# Patient Record
Sex: FEMALE | Race: WHITE | Employment: OTHER | URBAN - METROPOLITAN AREA
[De-identification: names, ages, dates, MRNs, and addresses within clinical notes are randomized per-mention and may not be internally consistent; named-entity substitution may affect disease eponyms.]

---

## 2017-05-19 ENCOUNTER — APPOINTMENT (OUTPATIENT)
Dept: MAMMOGRAPHY | Age: 67
End: 2017-05-19
Attending: FAMILY MEDICINE
Payer: COMMERCIAL

## 2017-05-19 ENCOUNTER — HOSPITAL ENCOUNTER (OUTPATIENT)
Dept: MAMMOGRAPHY | Age: 67
Discharge: HOME | End: 2017-05-19
Attending: FAMILY MEDICINE
Payer: COMMERCIAL

## 2017-05-19 DIAGNOSIS — Z78.0 POSTMENOPAUSAL: ICD-10-CM

## 2017-05-19 DIAGNOSIS — Z12.31 ENCOUNTER FOR SCREENING MAMMOGRAM FOR BREAST CANCER: ICD-10-CM

## 2017-05-19 PROCEDURE — 77080 DXA BONE DENSITY AXIAL: CPT

## 2017-05-19 PROCEDURE — 77067 SCR MAMMO BI INCL CAD: CPT

## 2017-05-26 RX ORDER — CEFAZOLIN SODIUM IN 0.9 % NACL 2 G/50 ML
2 INTRAVENOUS SOLUTION, PIGGYBACK (ML) INTRAVENOUS ONCE
Status: CANCELLED | OUTPATIENT
Start: 2017-05-26 | End: 2017-05-26

## 2017-05-26 NOTE — H&P
[id number] [facility]  Pre Operative History and Physical Exam    Patient ID:  Mary Mariano  [id number]  46 y.o.  1950    Today: May 26, 2017          CC:  Left  Knee pain    HPI:   The patient has end stage arthritis of the left knee. The patient was evaluated and examined during a consultation prior to today. The patient complains of knee pain with activities, reports pain as mostly occurring along the joint lines, reports stiffness of the knee with prolonged inactivity, and swelling/pain at the end of the day and after increased physical activity. The pain affects the patients activities of daily living and quality of life. The patient has attempted and exhausted conservative treatment. There have been no changes to the patient's orthopedic condition since the last office visit. Past Medical History:  Past Medical History:   Diagnosis Date    Asthma     stable-last used rescue inhaler 7/12; no hospitalized; denies SOB with exertion    Diabetes ([address] 10/12- no meds    \"borderline\"- A1C was high- had 120 fasting    Hypertension     controlled with med    Morbid obesity ([address]     BMI-34.5 on 2/14/13    Thyroid disease     hypo- on med    Unspecified adverse effect of anesthesia     possible sleep apnea       Past Surgical History:  Past Surgical History:   Procedure Laterality Date    HX APPENDECTOMY  1966    HX CARPAL TUNNEL RELEASE  1984    HX GYN      TVH    HX HEENT      T&A/ sinus    HX ORTHOPAEDIC      knee scopes- x 4- SA for one    HX REFRACTIVE SURGERY  2001    NEUROLOGICAL PROCEDURE UNLISTED      [facility]        Medications:     Prior to Admission medications    Medication Sig Start Date End Date Taking? Authorizing Provider   ergocalciferol (ERGOCALCIFEROL) 50,000 unit capsule Take 1 Cap by mouth every seven (7) days.  Indications: VITAMIN D DEFICIENCY (HIGH DOSE THERAPY) 5/5/17   Dee Garay MD   linagliptin-metformin (JENTADUETO XR) 5-1,000 mg TBph Take 1 Tab by mouth daily. 5/5/17   Donald Bueno MD   albuterol (PROVENTIL HFA) 90 mcg/actuation inhaler Take 1 Puff by inhalation every four (4) hours as needed. 4/6/17   Donald Bueno MD   montelukast (SINGULAIR) 10 mg tablet TAKE 1 TABLET DAILY 3/20/17   Donald Bueno MD   levothyroxine (SYNTHROID) 75 mcg tablet TAKE 1 TABLET DAILY BEFORE BREAKFAST 3/10/17   Donald Bueno MD   hydroCHLOROthiazide (HYDRODIURIL) 25 mg tablet Take 1 Tab by mouth daily. Indications: am 1/16/17   Donald Bueno MD   ibuprofen (MOTRIN) 800 mg tablet Take 1 Tab by mouth every eight (8) hours as needed. Takes at bedtime 10/27/16   Donald Bueno MD   amLODIPine-benazepril (LOTREL) 5-10 mg per capsule Take 1 Cap by mouth daily. 10/27/16   Donald Bueno MD       Family History:     Family History   Problem Relation Age of Onset    Heart Disease Mother      CHF    Diabetes Mother     Stroke Father     Heart Disease Father      MI x 3    Lung Disease Father      COPD    Hypertension Father     Heart Failure Father     Diabetes Sister     Breast Cancer Sister     Cancer Paternal Aunt     Cancer Paternal Uncle      brain    Cancer Maternal Grandmother      aortic       Social History:      Social History   Substance Use Topics    Smoking status: Former Smoker    Smokeless tobacco: Not on file    Alcohol use Yes      Comment: socially; beer,mixed,wine 1-3 xs weekly         Allergies: Allergies   Allergen Reactions    Other Medication Contact Dermatitis and Other (comments)     Neosporin/sulfa/bacitracin  \"all ointments- creams are OK\"-  blisters        Vitals: There were no vitals taken for this visit. Objective:         General: No Acute distress                   HEENT: Normocephalic/atramatic                   Lungs:  Breathing non-labored                   Heart:   RRR                    Abdomen: soft       Extremities:  Pain with ROM of the left knee. Trace effusion. Crepitus present.  Distally the patient shows no neurologic deficit. Antalgic gait appreciated. Meds:   Current Outpatient Prescriptions   Medication Sig    ergocalciferol (ERGOCALCIFEROL) 50,000 unit capsule Take 1 Cap by mouth every seven (7) days. Indications: VITAMIN D DEFICIENCY (HIGH DOSE THERAPY)    linagliptin-metformin (JENTADUETO XR) 5-1,000 mg TBph Take 1 Tab by mouth daily.  albuterol (PROVENTIL HFA) 90 mcg/actuation inhaler Take 1 Puff by inhalation every four (4) hours as needed.  montelukast (SINGULAIR) 10 mg tablet TAKE 1 TABLET DAILY    levothyroxine (SYNTHROID) 75 mcg tablet TAKE 1 TABLET DAILY BEFORE BREAKFAST    hydroCHLOROthiazide (HYDRODIURIL) 25 mg tablet Take 1 Tab by mouth daily. Indications: am    ibuprofen (MOTRIN) 800 mg tablet Take 1 Tab by mouth every eight (8) hours as needed. Takes at bedtime    amLODIPine-benazepril (LOTREL) 5-10 mg per capsule Take 1 Cap by mouth daily. No current facility-administered medications for this encounter. There is no problem list on file for this patient. Assessment:   1. Arthritis of the Left knee    Plan:   The patient has failed previous conservative treatment for this condition including anti-inflammatories, injections and lifestyle modifications. The necessity for joint replacement is present. Risks, benefits, alternatives and possible complications of left knee arthroplasty have been discussed with the patient including but not limited to infection, bleeding, damage to nerves and/or blood vessels, stiffness of the knee after surgery, potential for patellar maltracking, potential for fracture both intra-op and post-op, polyethylene wear, implant loosening, risk for revision surgery should any of the above occur, venous thrombo-embolism including deep vein thrombosis and pulmonary embolism, and potential for continued pain after surgery.  We have also previously discussed the potential of morbidity and mortality associated with, but not limited to, the actual surgical procedure, anesthesia, prior medical conditions, and/or the administration of medications perioperatively. The patient has been given the opportunity to ask questions all of which have been answered and the patient wishes to proceed. The patient will be admitted the day of surgery for left total knee replacement.         Signed By: Ly Moreno MD  May 26, 2017

## 2017-06-01 ENCOUNTER — HOSPITAL ENCOUNTER (OUTPATIENT)
Dept: SURGERY | Age: 67
Discharge: HOME | End: 2017-06-01
Attending: ORTHOPAEDIC SURGERY
Payer: MEDICARE

## 2017-06-01 VITALS
DIASTOLIC BLOOD PRESSURE: 59 MMHG | TEMPERATURE: 97.5 F | OXYGEN SATURATION: 98 % | HEIGHT: 65 IN | WEIGHT: 216.2 LBS | RESPIRATION RATE: 18 BRPM | BODY MASS INDEX: 36.02 KG/M2 | SYSTOLIC BLOOD PRESSURE: 118 MMHG | HEART RATE: 71 BPM

## 2017-06-01 LAB
ALBUMIN SERPL BCP-MCNC: 4 G/DL (ref 3.2–4.6)
ANION GAP BLD CALC-SCNC: 10 MMOL/L (ref 7–16)
APPEARANCE UR: CLEAR
APTT PPP: 23.2 SEC (ref 23.5–31.7)
ATRIAL RATE: 73 BPM
BACTERIA SPEC CULT: ABNORMAL
BACTERIA URNS QL MICRO: 0 /HPF
BASOPHILS # BLD AUTO: 0.1 K/UL (ref 0–0.2)
BASOPHILS # BLD: 1 % (ref 0–2)
BILIRUB UR QL: ABNORMAL
BUN SERPL-MCNC: 14 MG/DL (ref 8–23)
CALCIUM SERPL-MCNC: 9.6 MG/DL (ref 8.3–10.4)
CALCULATED P AXIS, ECG09: 27 DEGREES
CALCULATED R AXIS, ECG10: -49 DEGREES
CALCULATED T AXIS, ECG11: 80 DEGREES
CHLORIDE SERPL-SCNC: 103 MMOL/L (ref 98–107)
CO2 SERPL-SCNC: 30 MMOL/L (ref 21–32)
COLOR UR: YELLOW
CREAT SERPL-MCNC: 0.83 MG/DL (ref 0.6–1)
DIAGNOSIS, 93000: NORMAL
DIFFERENTIAL METHOD BLD: NORMAL
EOSINOPHIL # BLD: 0.1 K/UL (ref 0–0.8)
EOSINOPHIL NFR BLD: 2 % (ref 0.5–7.8)
ERYTHROCYTE [DISTWIDTH] IN BLOOD BY AUTOMATED COUNT: 13.7 % (ref 11.9–14.6)
EST. AVERAGE GLUCOSE BLD GHB EST-MCNC: 166 MG/DL
GLUCOSE SERPL-MCNC: 120 MG/DL (ref 65–100)
GLUCOSE UR STRIP.AUTO-MCNC: 500 MG/DL
HBA1C MFR BLD: 7.4 % (ref 4.8–6)
HCT VFR BLD AUTO: 44.8 % (ref 35.8–46.3)
HGB BLD-MCNC: 15.2 G/DL (ref 11.7–15.4)
HGB UR QL STRIP: NEGATIVE
IMM GRANULOCYTES # BLD: 0 K/UL (ref 0–0.5)
IMM GRANULOCYTES NFR BLD AUTO: 0.3 % (ref 0–5)
INR PPP: 0.9 (ref 0.9–1.2)
KETONES UR QL STRIP.AUTO: NEGATIVE MG/DL
LEUKOCYTE ESTERASE UR QL STRIP.AUTO: NEGATIVE
LYMPHOCYTES # BLD AUTO: 25 % (ref 13–44)
LYMPHOCYTES # BLD: 1.6 K/UL (ref 0.5–4.6)
MCH RBC QN AUTO: 31.3 PG (ref 26.1–32.9)
MCHC RBC AUTO-ENTMCNC: 33.9 G/DL (ref 31.4–35)
MCV RBC AUTO: 92.4 FL (ref 79.6–97.8)
MONOCYTES # BLD: 0.6 K/UL (ref 0.1–1.3)
MONOCYTES NFR BLD AUTO: 9 % (ref 4–12)
NEUTS SEG # BLD: 4.2 K/UL (ref 1.7–8.2)
NEUTS SEG NFR BLD AUTO: 63 % (ref 43–78)
NITRITE UR QL STRIP.AUTO: NEGATIVE
OTHER OBSERVATIONS,UCOM: ABNORMAL
P-R INTERVAL, ECG05: 152 MS
PH UR STRIP: 6 [PH] (ref 5–9)
PLATELET # BLD AUTO: 270 K/UL (ref 150–450)
PMV BLD AUTO: 11.5 FL (ref 10.8–14.1)
POTASSIUM SERPL-SCNC: 3.7 MMOL/L (ref 3.5–5.1)
PROT UR STRIP-MCNC: NEGATIVE MG/DL
PROTHROMBIN TIME: 10 SEC (ref 9.6–12)
Q-T INTERVAL, ECG07: 398 MS
QRS DURATION, ECG06: 84 MS
QTC CALCULATION (BEZET), ECG08: 438 MS
RBC # BLD AUTO: 4.85 M/UL (ref 4.05–5.25)
SERVICE CMNT-IMP: ABNORMAL
SODIUM SERPL-SCNC: 143 MMOL/L (ref 136–145)
SP GR UR REFRACTOMETRY: 1.01 (ref 1–1.02)
UROBILINOGEN UR QL STRIP.AUTO: 0.2 EU/DL (ref 0.2–1)
VENTRICULAR RATE, ECG03: 73 BPM
WBC # BLD AUTO: 6.6 K/UL (ref 4.3–11.1)

## 2017-06-01 PROCEDURE — 83036 HEMOGLOBIN GLYCOSYLATED A1C: CPT | Performed by: ORTHOPAEDIC SURGERY

## 2017-06-01 PROCEDURE — 85730 THROMBOPLASTIN TIME PARTIAL: CPT | Performed by: ORTHOPAEDIC SURGERY

## 2017-06-01 PROCEDURE — 80048 BASIC METABOLIC PNL TOTAL CA: CPT | Performed by: ORTHOPAEDIC SURGERY

## 2017-06-01 PROCEDURE — 81003 URINALYSIS AUTO W/O SCOPE: CPT | Performed by: ORTHOPAEDIC SURGERY

## 2017-06-01 PROCEDURE — 85025 COMPLETE CBC W/AUTO DIFF WBC: CPT | Performed by: ORTHOPAEDIC SURGERY

## 2017-06-01 PROCEDURE — 82040 ASSAY OF SERUM ALBUMIN: CPT | Performed by: ORTHOPAEDIC SURGERY

## 2017-06-01 PROCEDURE — 87641 MR-STAPH DNA AMP PROBE: CPT | Performed by: ORTHOPAEDIC SURGERY

## 2017-06-01 PROCEDURE — 85610 PROTHROMBIN TIME: CPT | Performed by: ORTHOPAEDIC SURGERY

## 2017-06-01 PROCEDURE — 93005 ELECTROCARDIOGRAM TRACING: CPT | Performed by: ORTHOPAEDIC SURGERY

## 2017-06-01 PROCEDURE — 36415 COLL VENOUS BLD VENIPUNCTURE: CPT | Performed by: ORTHOPAEDIC SURGERY

## 2017-06-01 NOTE — PERIOP NOTES
Lab results are within limits per anesthesia protocol. Pt to come back to Outpatient Lab for urine nicotene; per Lab not enough urine to run test.  Pt verbalizes understanding, states she will come 6-2-17.

## 2017-06-01 NOTE — PERIOP NOTES
Patient verified name, , and surgery as listed in [facility]. Anesthesia review ekg. Pt is allergic to base in all ointments; cannot use mupirocin ointment. If MSSA swab is positive pt is to have Betadine nasal wash on DOS. Type 3 surgery, Joint assessment complete. Labs per surgeon: cbc,bmp,pt,ptt,ua, urine nicotene, albumin, hgb a1c,mssa ; results pending. Labs per anesthesia protocol: T&S on DOS. EKG:today-abnormal- anesthesia review. Pt reports only prior ekg for comparison > 20 years ago. Hibiclens and instructions given per hospital policy. Nasal Swab collected per MD order and instructions for Mupirocin nasal ointment if required. Patient provided with handouts including Guide to Surgery, Pain Management, Hand Hygiene, Blood Transfusion Education, and [place] [facility] Brochure. Patient answered medical/surgical history questions at their best of ability. All prior to admission medications documented in [facility]. Original medication prescription bottle  visualized during patient appointment. Patient instructed to hold all vitamins 7 days prior to surgery and NSAIDS 5 days prior to surgery. Medications to be held prior to surgery : none. Patient instructed to continue previous medications as prescribed prior to surgery and to take the following medications the day of surgery according to anesthesia guidelines with a small sip of water: levothyroxine; use proventil MDI if needed and bring. .  Patient taught back and verbalized understanding.

## 2017-06-01 NOTE — PERIOP NOTES
Recent Results (from the past 12 hour(s))   EKG, 12 LEAD, INITIAL    Collection Time: 06/01/17 10:15 AM   Result Value Ref Range    Ventricular Rate 73 BPM    Atrial Rate 73 BPM    P-R Interval 152 ms    QRS Duration 84 ms    Q-T Interval 398 ms    QTC Calculation (Bezet) 438 ms    Calculated P Axis 27 degrees    Calculated R Axis -49 degrees    Calculated T Axis 80 degrees    Diagnosis       Normal sinus rhythm  Left anterior fascicular block  Abnormal ECG  When compared with ECG of 01-JUN-2017 10:12,  No significant change was found     URINALYSIS W/ RFLX MICROSCOPIC    Collection Time: 06/01/17 11:00 AM   Result Value Ref Range    Color YELLOW      Appearance CLEAR      Specific gravity 1.010 1.001 - 1.023      pH (UA) 6.0 5.0 - 9.0      Protein NEGATIVE  NEG mg/dL    Glucose 500 (A) NEG mg/dL    Ketone NEGATIVE  NEG mg/dL    Bilirubin SMALL (A) NEG      Blood NEGATIVE  NEG      Urobilinogen 0.2 0.2 - 1.0 EU/dL    Nitrites NEGATIVE  NEG      Leukocyte Esterase NEGATIVE  NEG      Bacteria 0 0 /hpf    Other observations PERFORMED MANUALLY ON < 2 CC OF URINE    CBC WITH AUTOMATED DIFF    Collection Time: 06/01/17 11:05 AM   Result Value Ref Range    WBC 6.6 4.3 - 11.1 K/uL    RBC 4.85 4.05 - 5.25 M/uL    HGB 15.2 11.7 - 15.4 g/dL    HCT 44.8 35.8 - 46.3 %    MCV 92.4 79.6 - 97.8 FL    MCH 31.3 26.1 - 32.9 PG    MCHC 33.9 31.4 - 35.0 g/dL    RDW 13.7 11.9 - 14.6 %    PLATELET 218 563 - 034 K/uL    MPV 11.5 10.8 - 14.1 FL    DF AUTOMATED      NEUTROPHILS 63 43 - 78 %    LYMPHOCYTES 25 13 - 44 %    MONOCYTES 9 4.0 - 12.0 %    EOSINOPHILS 2 0.5 - 7.8 %    BASOPHILS 1 0.0 - 2.0 %    IMMATURE GRANULOCYTES 0.3 0.0 - 5.0 %    ABS. NEUTROPHILS 4.2 1.7 - 8.2 K/UL    ABS. LYMPHOCYTES 1.6 0.5 - 4.6 K/UL    ABS. MONOCYTES 0.6 0.1 - 1.3 K/UL    ABS. EOSINOPHILS 0.1 0.0 - 0.8 K/UL    ABS. BASOPHILS 0.1 0.0 - 0.2 K/UL    ABS. IMM.  GRANS. 0.0 0.0 - 0.5 K/UL   PROTHROMBIN TIME + INR    Collection Time: 06/01/17 11:05 AM   Result Value Ref Range    Prothrombin time 10.0 9.6 - 12.0 sec    INR 0.9 0.9 - 1.2     PTT    Collection Time: 06/01/17 11:05 AM   Result Value Ref Range    aPTT 23.2 (L) 23.5 - 71.2 SEC   METABOLIC PANEL, BASIC    Collection Time: 06/01/17 11:05 AM   Result Value Ref Range    Sodium 143 136 - 145 mmol/L    Potassium 3.7 3.5 - 5.1 mmol/L    Chloride 103 98 - 107 mmol/L    CO2 30 21 - 32 mmol/L    Anion gap 10 7 - 16 mmol/L    Glucose 120 (H) 65 - 100 mg/dL    BUN 14 8 - 23 MG/DL    Creatinine 0.83 0.6 - 1.0 MG/DL    GFR est AA >60 >60 ml/min/1.73m2    GFR est non-AA >60 >60 ml/min/1.73m2    Calcium 9.6 8.3 - 10.4 MG/DL   ALBUMIN    Collection Time: 06/01/17 11:05 AM   Result Value Ref Range    Albumin 4.0 3.2 - 4.6 g/dL   HEMOGLOBIN A1C WITH EAG    Collection Time: 06/01/17 11:05 AM   Result Value Ref Range    Hemoglobin A1c 7.4 (H) 4.8 - 6.0 %    Est. average glucose 166 mg/dL

## 2017-06-01 NOTE — PERIOP NOTES
Recent Results (from the past 12 hour(s))   EKG, 12 LEAD, INITIAL    Collection Time: 06/01/17 10:15 AM   Result Value Ref Range    Ventricular Rate 73 BPM    Atrial Rate 73 BPM    P-R Interval 152 ms    QRS Duration 84 ms    Q-T Interval 398 ms    QTC Calculation (Bezet) 438 ms    Calculated P Axis 27 degrees    Calculated R Axis -49 degrees    Calculated T Axis 80 degrees    Diagnosis       Normal sinus rhythm  Left anterior fascicular block  Abnormal ECG  When compared with ECG of 01-JUN-2017 10:12,  No significant change was found     URINALYSIS W/ RFLX MICROSCOPIC    Collection Time: 06/01/17 11:00 AM   Result Value Ref Range    Color YELLOW      Appearance CLEAR      Specific gravity 1.010 1.001 - 1.023      pH (UA) 6.0 5.0 - 9.0      Protein NEGATIVE  NEG mg/dL    Glucose 500 (A) NEG mg/dL    Ketone NEGATIVE  NEG mg/dL    Bilirubin SMALL (A) NEG      Blood NEGATIVE  NEG      Urobilinogen 0.2 0.2 - 1.0 EU/dL    Nitrites NEGATIVE  NEG      Leukocyte Esterase NEGATIVE  NEG      Bacteria 0 0 /hpf    Other observations PERFORMED MANUALLY ON < 2 CC OF URINE    CBC WITH AUTOMATED DIFF    Collection Time: 06/01/17 11:05 AM   Result Value Ref Range    WBC 6.6 4.3 - 11.1 K/uL    RBC 4.85 4.05 - 5.25 M/uL    HGB 15.2 11.7 - 15.4 g/dL    HCT 44.8 35.8 - 46.3 %    MCV 92.4 79.6 - 97.8 FL    MCH 31.3 26.1 - 32.9 PG    MCHC 33.9 31.4 - 35.0 g/dL    RDW 13.7 11.9 - 14.6 %    PLATELET 925 928 - 082 K/uL    MPV 11.5 10.8 - 14.1 FL    DF AUTOMATED      NEUTROPHILS 63 43 - 78 %    LYMPHOCYTES 25 13 - 44 %    MONOCYTES 9 4.0 - 12.0 %    EOSINOPHILS 2 0.5 - 7.8 %    BASOPHILS 1 0.0 - 2.0 %    IMMATURE GRANULOCYTES 0.3 0.0 - 5.0 %    ABS. NEUTROPHILS 4.2 1.7 - 8.2 K/UL    ABS. LYMPHOCYTES 1.6 0.5 - 4.6 K/UL    ABS. MONOCYTES 0.6 0.1 - 1.3 K/UL    ABS. EOSINOPHILS 0.1 0.0 - 0.8 K/UL    ABS. BASOPHILS 0.1 0.0 - 0.2 K/UL    ABS. IMM.  GRANS. 0.0 0.0 - 0.5 K/UL   PROTHROMBIN TIME + INR    Collection Time: 06/01/17 11:05 AM   Result Value Ref Range    Prothrombin time 10.0 9.6 - 12.0 sec    INR 0.9 0.9 - 1.2     PTT    Collection Time: 06/01/17 11:05 AM   Result Value Ref Range    aPTT 23.2 (L) 23.5 - 65.6 SEC   METABOLIC PANEL, BASIC    Collection Time: 06/01/17 11:05 AM   Result Value Ref Range    Sodium 143 136 - 145 mmol/L    Potassium 3.7 3.5 - 5.1 mmol/L    Chloride 103 98 - 107 mmol/L    CO2 30 21 - 32 mmol/L    Anion gap 10 7 - 16 mmol/L    Glucose 120 (H) 65 - 100 mg/dL    BUN 14 8 - 23 MG/DL    Creatinine 0.83 0.6 - 1.0 MG/DL    GFR est AA >60 >60 ml/min/1.73m2    GFR est non-AA >60 >60 ml/min/1.73m2    Calcium 9.6 8.3 - 10.4 MG/DL   ALBUMIN    Collection Time: 06/01/17 11:05 AM   Result Value Ref Range    Albumin 4.0 3.2 - 4.6 g/dL   HEMOGLOBIN A1C WITH EAG    Collection Time: 06/01/17 11:05 AM   Result Value Ref Range    Hemoglobin A1c 7.4 (H) 4.8 - 6.0 %    Est. average glucose 166 mg/dL

## 2017-06-01 NOTE — ADVANCED PRACTICE NURSE
Total Joint Surgery Preoperative Chart Review      Patient ID:  Odette Smith  [id number]  60 y.o.  1950  Surgeon: Dr Nancy Carballo  Date of Surgery: 6/7/2017  Procedure: Total Left Knee Arthroplasty  Primary Care Physician: Leidy Cardona [phone]  Specialty Physician(s):      Subjective: Odette Smith is a 74 y.o. WHITE OR  female who presents for preoperative evaluation for Total Left Knee arthroplasty. This is a preoperative chart review note based on data collected by the nurse at the surgical Pre-Assessment visit.     Past Medical History:   Diagnosis Date    Asthma     stable-last used rescue inhaler 7/12; no hospitalized; denies SOB with exertion    Diabetes ([address] 2012    \"borderline\"- A1C was high- had 120 fasting; Hgb A1C 7.4 on 6-1-17    Hypertension     controlled with med    Morbid obesity ([address]     Thromboembolus ([address]     hx 1980s r/t HRT    Thyroid disease     hypo- on med    Unspecified adverse effect of anesthesia     possible sleep apnea      Past Surgical History:   Procedure Laterality Date    HX APPENDECTOMY  1966    HX CARPAL TUNNEL RELEASE  1984    HX GYN      TVH    HX HEENT      T&A/ sinus    HX ORTHOPAEDIC      knee scopes- x 4- SA for one    HX REFRACTIVE SURGERY  2001    NEUROLOGICAL PROCEDURE UNLISTED      [facility]     Family History   Problem Relation Age of Onset    Heart Disease Mother      CHF    Diabetes Mother     Stroke Father     Heart Disease Father      MI x 3    Lung Disease Father      COPD    Hypertension Father     Heart Failure Father     Diabetes Sister     Breast Cancer Sister     Cancer Paternal Aunt     Cancer Paternal Uncle      brain    Cancer Maternal Grandmother      aortic      Social History   Substance Use Topics    Smoking status: Former Smoker    Smokeless tobacco: Not on file    Alcohol use Yes      Comment: socially; beer,mixed,wine 1-3 xs weekly       Prior to Admission medications    Medication Sig Start Date End Date Taking? Authorizing Provider   ergocalciferol (ERGOCALCIFEROL) 50,000 unit capsule Take 1 Cap by mouth every seven (7) days. Indications: VITAMIN D DEFICIENCY (HIGH DOSE THERAPY) 5/5/17   Iraida Chaudhari MD   linagliptin-metformin (JENTADUETO XR) 5-1,000 mg TBph Take 1 Tab by mouth daily. Patient taking differently: Take 1 Tab by mouth daily. Indications: type 2 diabetes mellitus 5/5/17   Iraida Chaudhari MD   albuterol (PROVENTIL HFA) 90 mcg/actuation inhaler Take 1 Puff by inhalation every four (4) hours as needed. Patient taking differently: Take 1 Puff by inhalation every four (4) hours as needed. Take morning of surgery per anesthesia guidelines if needed and bring to hospital 4/6/17   Iraida Chaudhari MD   montelukast (SINGULAIR) 10 mg tablet TAKE 1 TABLET DAILY 3/20/17   Iraida Chaudhari MD   levothyroxine (SYNTHROID) 75 mcg tablet TAKE 1 TABLET DAILY BEFORE BREAKFAST 3/10/17   Iraida Chaudhari MD   hydroCHLOROthiazide (HYDRODIURIL) 25 mg tablet Take 1 Tab by mouth daily. Indications: am 1/16/17   Iraida Chaudhari MD   amLODIPine-benazepril (LOTREL) 5-10 mg per capsule Take 1 Cap by mouth daily.  10/27/16   Iraida Chaudhari MD     Allergies   Allergen Reactions    Other Medication Contact Dermatitis and Other (comments)     Neosporin/sulfa/bacitracin  \"all ointments- creams are OK\"-  blisters    Sulfa (Sulfonamide Antibiotics) Rash    Ultram [Tramadol] Nausea and Vomiting          Objective:     Physical Exam:   Patient Vitals for the past 24 hrs:   Temp Pulse Resp BP SpO2   06/01/17 1008 97.5 °F (36.4 °C) 71 18 118/59 98 %       ECG:    EKG Results     Procedure 720 Value Units Date/Time    EKG, 12 LEAD, INITIAL [[id number]] Collected:  06/01/17 1015    Order Status:  Completed Updated:  06/01/17 1206     Ventricular Rate 73 BPM      Atrial Rate 73 BPM      P-R Interval 152 ms      QRS Duration 84 ms      Q-T Interval 398 ms      QTC Calculation (Bezet) 438 ms      Calculated P Axis 27 degrees      Calculated R Axis -49 degrees      Calculated T Axis 80 degrees      Diagnosis --     Normal sinus rhythm  Left anterior fascicular block  Abnormal ECG  When compared with ECG of 01-JUN-2017 10:12,  No significant change was found            Data Review:   Labs:   Recent Results (from the past 24 hour(s))   EKG, 12 LEAD, INITIAL    Collection Time: 06/01/17 10:15 AM   Result Value Ref Range    Ventricular Rate 73 BPM    Atrial Rate 73 BPM    P-R Interval 152 ms    QRS Duration 84 ms    Q-T Interval 398 ms    QTC Calculation (Bezet) 438 ms    Calculated P Axis 27 degrees    Calculated R Axis -49 degrees    Calculated T Axis 80 degrees    Diagnosis       Normal sinus rhythm  Left anterior fascicular block  Abnormal ECG  When compared with ECG of 01-JUN-2017 10:12,  No significant change was found     URINALYSIS W/ RFLX MICROSCOPIC    Collection Time: 06/01/17 11:00 AM   Result Value Ref Range    Color YELLOW      Appearance CLEAR      Specific gravity 1.010 1.001 - 1.023      pH (UA) 6.0 5.0 - 9.0      Protein NEGATIVE  NEG mg/dL    Glucose 500 (A) NEG mg/dL    Ketone NEGATIVE  NEG mg/dL    Bilirubin SMALL (A) NEG      Blood NEGATIVE  NEG      Urobilinogen 0.2 0.2 - 1.0 EU/dL    Nitrites NEGATIVE  NEG      Leukocyte Esterase NEGATIVE  NEG      Bacteria 0 0 /hpf    Other observations PERFORMED MANUALLY ON < 2 CC OF URINE    CBC WITH AUTOMATED DIFF    Collection Time: 06/01/17 11:05 AM   Result Value Ref Range    WBC 6.6 4.3 - 11.1 K/uL    RBC 4.85 4.05 - 5.25 M/uL    HGB 15.2 11.7 - 15.4 g/dL    HCT 44.8 35.8 - 46.3 %    MCV 92.4 79.6 - 97.8 FL    MCH 31.3 26.1 - 32.9 PG    MCHC 33.9 31.4 - 35.0 g/dL    RDW 13.7 11.9 - 14.6 %    PLATELET 534 127 - 448 K/uL    MPV 11.5 10.8 - 14.1 FL    DF AUTOMATED      NEUTROPHILS 63 43 - 78 %    LYMPHOCYTES 25 13 - 44 %    MONOCYTES 9 4.0 - 12.0 %    EOSINOPHILS 2 0.5 - 7.8 %    BASOPHILS 1 0.0 - 2.0 %    IMMATURE GRANULOCYTES 0.3 0.0 - 5.0 %    ABS. NEUTROPHILS 4.2 1.7 - 8.2 K/UL    ABS. LYMPHOCYTES 1.6 0.5 - 4.6 K/UL    ABS. MONOCYTES 0.6 0.1 - 1.3 K/UL    ABS. EOSINOPHILS 0.1 0.0 - 0.8 K/UL    ABS. BASOPHILS 0.1 0.0 - 0.2 K/UL    ABS. IMM. GRANS. 0.0 0.0 - 0.5 K/UL   PROTHROMBIN TIME + INR    Collection Time: 06/01/17 11:05 AM   Result Value Ref Range    Prothrombin time 10.0 9.6 - 12.0 sec    INR 0.9 0.9 - 1.2     PTT    Collection Time: 06/01/17 11:05 AM   Result Value Ref Range    aPTT 23.2 (L) 23.5 - 45.7 SEC   METABOLIC PANEL, BASIC    Collection Time: 06/01/17 11:05 AM   Result Value Ref Range    Sodium 143 136 - 145 mmol/L    Potassium 3.7 3.5 - 5.1 mmol/L    Chloride 103 98 - 107 mmol/L    CO2 30 21 - 32 mmol/L    Anion gap 10 7 - 16 mmol/L    Glucose 120 (H) 65 - 100 mg/dL    BUN 14 8 - 23 MG/DL    Creatinine 0.83 0.6 - 1.0 MG/DL    GFR est AA >60 >60 ml/min/1.73m2    GFR est non-AA >60 >60 ml/min/1.73m2    Calcium 9.6 8.3 - 10.4 MG/DL   ALBUMIN    Collection Time: 06/01/17 11:05 AM   Result Value Ref Range    Albumin 4.0 3.2 - 4.6 g/dL   HEMOGLOBIN A1C WITH EAG    Collection Time: 06/01/17 11:05 AM   Result Value Ref Range    Hemoglobin A1c 7.4 (H) 4.8 - 6.0 %    Est. average glucose 166 mg/dL           Total Joint Surgery Pre-Assessment Recommendations:           Risk for BIJU. Recommend continuous saturation monitoring hours of sleep, during hospitalization.         Signed By: ERI Carrion    June 1, 2017

## 2017-06-02 ENCOUNTER — HOSPITAL ENCOUNTER (OUTPATIENT)
Dept: LAB | Age: 67
Discharge: HOME | End: 2017-06-02
Payer: MEDICARE

## 2017-06-02 PROCEDURE — 80307 DRUG TEST PRSMV CHEM ANLYZR: CPT | Performed by: ORTHOPAEDIC SURGERY

## 2017-06-02 NOTE — PERIOP NOTES
Nasal swab results reviewed:   Culture result:  (A)    Final   MRSA target DNA not detected, SA target DNA detected.   A MRSA negative, SA positive test result does not preclude MRSA nasal colonization         Called pt and informed of results    Due to pt's allergies to ointments (due to base product in all ointments), pt will require betadine nasal swab pre op DOS per guidelines.

## 2017-06-05 LAB
COTININE UR QL SCN: NEGATIVE NG/ML
DRUG SCREEN COMMENT:, 753798: NORMAL

## 2017-06-05 NOTE — PERIOP NOTES
6/5/2017  8:36 AM - Rene, [facility]   Component Results   Component Value Flag Ref Range Units Status   Cotinine Screen, urine NEGATIVE   Pmysfk=280 ng/mL Final

## 2017-06-06 ENCOUNTER — ANESTHESIA EVENT (OUTPATIENT)
Dept: SURGERY | Age: 67
DRG: 470 | End: 2017-06-06
Payer: MEDICARE

## 2017-06-06 RX ORDER — POVIDONE-IODINE 10 %
SOLUTION, NON-ORAL TOPICAL ONCE
Status: DISPENSED | OUTPATIENT
Start: 2017-06-06 | End: 2017-06-07

## 2017-06-06 NOTE — ANESTHESIA PREPROCEDURE EVALUATION
Anesthetic History               Review of Systems / Medical History  Patient summary reviewed and pertinent labs reviewed    Pulmonary          Undiagnosed apnea  Asthma : well controlled       Neuro/Psych              Cardiovascular    Hypertension                   GI/Hepatic/Renal  Within defined limits              Endo/Other    Diabetes: poorly controlled, type 2  Hypothyroidism  Obesity     Other Findings            Physical Exam    Airway  Mallampati: II  TM Distance: 4 - 6 cm  Neck ROM: normal range of motion   Mouth opening: Normal     Cardiovascular    Rhythm: regular  Rate: normal         Dental         Pulmonary  Breath sounds clear to auscultation               Abdominal         Other Findings            Anesthetic Plan    ASA: 3  Anesthesia type: spinal      Post-op pain plan if not by surgeon: peripheral nerve block single    Induction: Intravenous  Anesthetic plan and risks discussed with: Patient

## 2017-06-07 ENCOUNTER — HOSPITAL ENCOUNTER (INPATIENT)
Age: 67
LOS: 2 days | Discharge: HOME HEALTH | DRG: 470 | End: 2017-06-09
Attending: ORTHOPAEDIC SURGERY | Admitting: ORTHOPAEDIC SURGERY
Payer: MEDICARE

## 2017-06-07 ENCOUNTER — ANESTHESIA (OUTPATIENT)
Dept: SURGERY | Age: 67
DRG: 470 | End: 2017-06-07
Payer: MEDICARE

## 2017-06-07 ENCOUNTER — HOME HEALTH ADMISSION (OUTPATIENT)
Dept: HOME HEALTH SERVICES | Facility: HOME HEALTH | Age: 67
End: 2017-06-07
Payer: MEDICARE

## 2017-06-07 PROBLEM — M17.9 OA (OSTEOARTHRITIS) OF KNEE: Status: ACTIVE | Noted: 2017-06-07

## 2017-06-07 LAB
ABO + RH BLD: NORMAL
BLOOD GROUP ANTIBODIES SERPL: NORMAL
GLUCOSE BLD STRIP.AUTO-MCNC: 153 MG/DL (ref 65–100)
GLUCOSE BLD STRIP.AUTO-MCNC: 162 MG/DL (ref 65–100)
HGB BLD-MCNC: 11.2 G/DL (ref 11.7–15.4)
SPECIMEN EXP DATE BLD: NORMAL

## 2017-06-07 PROCEDURE — C1776 JOINT DEVICE (IMPLANTABLE): HCPCS | Performed by: ORTHOPAEDIC SURGERY

## 2017-06-07 PROCEDURE — 97165 OT EVAL LOW COMPLEX 30 MIN: CPT

## 2017-06-07 PROCEDURE — 74011250636 HC RX REV CODE- 250/636: Performed by: ANESTHESIOLOGY

## 2017-06-07 PROCEDURE — 76010000162 HC OR TIME 1.5 TO 2 HR INTENSV-TIER 1: Performed by: ORTHOPAEDIC SURGERY

## 2017-06-07 PROCEDURE — 76010010054 HC POST OP PAIN BLOCK: Performed by: ORTHOPAEDIC SURGERY

## 2017-06-07 PROCEDURE — 77030036688 HC BLNKT CLD THER S2SG -B

## 2017-06-07 PROCEDURE — 0SRD0J9 REPLACEMENT OF LEFT KNEE JOINT WITH SYNTHETIC SUBSTITUTE, CEMENTED, OPEN APPROACH: ICD-10-PCS | Performed by: ORTHOPAEDIC SURGERY

## 2017-06-07 PROCEDURE — 82962 GLUCOSE BLOOD TEST: CPT

## 2017-06-07 PROCEDURE — 77030018836 HC SOL IRR NACL ICUM -A: Performed by: ORTHOPAEDIC SURGERY

## 2017-06-07 PROCEDURE — 77030033067 HC SUT PDO STRATFX SPIR J&J -B: Performed by: ORTHOPAEDIC SURGERY

## 2017-06-07 PROCEDURE — 77030031139 HC SUT VCRL2 J&J -A: Performed by: ORTHOPAEDIC SURGERY

## 2017-06-07 PROCEDURE — 74011000250 HC RX REV CODE- 250

## 2017-06-07 PROCEDURE — 76210000016 HC OR PH I REC 1 TO 1.5 HR: Performed by: ORTHOPAEDIC SURGERY

## 2017-06-07 PROCEDURE — 74011000250 HC RX REV CODE- 250: Performed by: ORTHOPAEDIC SURGERY

## 2017-06-07 PROCEDURE — 85018 HEMOGLOBIN: CPT | Performed by: ORTHOPAEDIC SURGERY

## 2017-06-07 PROCEDURE — 77030032490 HC SLV COMPR SCD KNE COVD -B

## 2017-06-07 PROCEDURE — 77030003666 HC NDL SPINAL BD -A: Performed by: ORTHOPAEDIC SURGERY

## 2017-06-07 PROCEDURE — 74011250636 HC RX REV CODE- 250/636

## 2017-06-07 PROCEDURE — 86580 TB INTRADERMAL TEST: CPT | Performed by: ANESTHESIOLOGY

## 2017-06-07 PROCEDURE — 77030007880 HC KT SPN EPDRL BBMI -B: Performed by: ANESTHESIOLOGY

## 2017-06-07 PROCEDURE — 77010033678 HC OXYGEN DAILY

## 2017-06-07 PROCEDURE — 77030002922 HC SUT FBRWRE ARTH -B: Performed by: ORTHOPAEDIC SURGERY

## 2017-06-07 PROCEDURE — 74011250636 HC RX REV CODE- 250/636: Performed by: ORTHOPAEDIC SURGERY

## 2017-06-07 PROCEDURE — 77030003665 HC NDL SPN BBMI -A: Performed by: ANESTHESIOLOGY

## 2017-06-07 PROCEDURE — 77030002933 HC SUT MCRYL J&J -A: Performed by: ORTHOPAEDIC SURGERY

## 2017-06-07 PROCEDURE — 77030012935 HC DRSG AQUACEL BMS -B: Performed by: ORTHOPAEDIC SURGERY

## 2017-06-07 PROCEDURE — 94760 N-INVAS EAR/PLS OXIMETRY 1: CPT

## 2017-06-07 PROCEDURE — 86900 BLOOD TYPING SEROLOGIC ABO: CPT | Performed by: ORTHOPAEDIC SURGERY

## 2017-06-07 PROCEDURE — 77030020782 HC GWN BAIR PAWS FLX 3M -B: Performed by: ANESTHESIOLOGY

## 2017-06-07 PROCEDURE — 77030013727 HC IRR FAN PULSVC ZIMM -B: Performed by: ORTHOPAEDIC SURGERY

## 2017-06-07 PROCEDURE — 77030003602 HC NDL NRV BLK BBMI -B: Performed by: ANESTHESIOLOGY

## 2017-06-07 PROCEDURE — 74011000302 HC RX REV CODE- 302: Performed by: ANESTHESIOLOGY

## 2017-06-07 PROCEDURE — 77030034849: Performed by: ORTHOPAEDIC SURGERY

## 2017-06-07 PROCEDURE — 74011000250 HC RX REV CODE- 250: Performed by: ANESTHESIOLOGY

## 2017-06-07 PROCEDURE — 76942 ECHO GUIDE FOR BIOPSY: CPT | Performed by: ORTHOPAEDIC SURGERY

## 2017-06-07 PROCEDURE — 77030011640 HC PAD GRND REM COVD -A: Performed by: ORTHOPAEDIC SURGERY

## 2017-06-07 PROCEDURE — 94762 N-INVAS EAR/PLS OXIMTRY CONT: CPT

## 2017-06-07 PROCEDURE — 77030008467 HC STPLR SKN COVD -B: Performed by: ORTHOPAEDIC SURGERY

## 2017-06-07 PROCEDURE — 65270000029 HC RM PRIVATE

## 2017-06-07 PROCEDURE — 77030012890

## 2017-06-07 PROCEDURE — 76060000034 HC ANESTHESIA 1.5 TO 2 HR: Performed by: ORTHOPAEDIC SURGERY

## 2017-06-07 PROCEDURE — 97161 PT EVAL LOW COMPLEX 20 MIN: CPT

## 2017-06-07 PROCEDURE — 36415 COLL VENOUS BLD VENIPUNCTURE: CPT | Performed by: ORTHOPAEDIC SURGERY

## 2017-06-07 PROCEDURE — 74011250637 HC RX REV CODE- 250/637: Performed by: ORTHOPAEDIC SURGERY

## 2017-06-07 DEVICE — COMPONENT PAT DIA35MM THK10MM SUPERIOR/INFERIOR KNEE: Type: IMPLANTABLE DEVICE | Site: KNEE | Status: FUNCTIONAL

## 2017-06-07 DEVICE — IMPLANTABLE DEVICE: Type: IMPLANTABLE DEVICE | Site: KNEE | Status: FUNCTIONAL

## 2017-06-07 DEVICE — BASEPLT TIB PC TRITNM SZ 5 -- TRIATHLON: Type: IMPLANTABLE DEVICE | Site: KNEE | Status: FUNCTIONAL

## 2017-06-07 DEVICE — COMPNT FEM CR TRIATHLN 4 L PA --: Type: IMPLANTABLE DEVICE | Site: KNEE | Status: FUNCTIONAL

## 2017-06-07 RX ORDER — CYCLOBENZAPRINE HCL 10 MG
5 TABLET ORAL 3 TIMES DAILY
Status: DISCONTINUED | OUTPATIENT
Start: 2017-06-07 | End: 2017-06-09 | Stop reason: HOSPADM

## 2017-06-07 RX ORDER — FENTANYL CITRATE 50 UG/ML
100 INJECTION, SOLUTION INTRAMUSCULAR; INTRAVENOUS ONCE
Status: COMPLETED | OUTPATIENT
Start: 2017-06-07 | End: 2017-06-07

## 2017-06-07 RX ORDER — NALOXONE HYDROCHLORIDE 0.4 MG/ML
.2-.4 INJECTION, SOLUTION INTRAMUSCULAR; INTRAVENOUS; SUBCUTANEOUS
Status: DISCONTINUED | OUTPATIENT
Start: 2017-06-07 | End: 2017-06-09 | Stop reason: HOSPADM

## 2017-06-07 RX ORDER — MONTELUKAST SODIUM 10 MG/1
10 TABLET ORAL
Status: DISCONTINUED | OUTPATIENT
Start: 2017-06-07 | End: 2017-06-09 | Stop reason: HOSPADM

## 2017-06-07 RX ORDER — AMOXICILLIN 250 MG
2 CAPSULE ORAL DAILY
Status: DISCONTINUED | OUTPATIENT
Start: 2017-06-08 | End: 2017-06-09 | Stop reason: HOSPADM

## 2017-06-07 RX ORDER — BUPIVACAINE HYDROCHLORIDE 7.5 MG/ML
INJECTION, SOLUTION INTRASPINAL AS NEEDED
Status: DISCONTINUED | OUTPATIENT
Start: 2017-06-07 | End: 2017-06-07 | Stop reason: HOSPADM

## 2017-06-07 RX ORDER — ASPIRIN 81 MG/1
81 TABLET ORAL 2 TIMES DAILY
Status: DISCONTINUED | OUTPATIENT
Start: 2017-06-07 | End: 2017-06-09 | Stop reason: HOSPADM

## 2017-06-07 RX ORDER — SODIUM CHLORIDE, SODIUM LACTATE, POTASSIUM CHLORIDE, CALCIUM CHLORIDE 600; 310; 30; 20 MG/100ML; MG/100ML; MG/100ML; MG/100ML
100 INJECTION, SOLUTION INTRAVENOUS CONTINUOUS
Status: DISCONTINUED | OUTPATIENT
Start: 2017-06-07 | End: 2017-06-07 | Stop reason: HOSPADM

## 2017-06-07 RX ORDER — KETOROLAC TROMETHAMINE 30 MG/ML
INJECTION, SOLUTION INTRAMUSCULAR; INTRAVENOUS AS NEEDED
Status: DISCONTINUED | OUTPATIENT
Start: 2017-06-07 | End: 2017-06-07 | Stop reason: HOSPADM

## 2017-06-07 RX ORDER — SODIUM CHLORIDE 0.9 % (FLUSH) 0.9 %
5-10 SYRINGE (ML) INJECTION AS NEEDED
Status: DISCONTINUED | OUTPATIENT
Start: 2017-06-07 | End: 2017-06-09 | Stop reason: HOSPADM

## 2017-06-07 RX ORDER — ONDANSETRON 8 MG/1
8 TABLET, ORALLY DISINTEGRATING ORAL
Status: DISCONTINUED | OUTPATIENT
Start: 2017-06-07 | End: 2017-06-09 | Stop reason: HOSPADM

## 2017-06-07 RX ORDER — CYCLOBENZAPRINE HCL 10 MG
5 TABLET ORAL 3 TIMES DAILY
Qty: 60 TAB | Refills: 0 | Status: SHIPPED | OUTPATIENT
Start: 2017-06-07

## 2017-06-07 RX ORDER — HYDROMORPHONE HYDROCHLORIDE 2 MG/1
2 TABLET ORAL
Status: DISCONTINUED | OUTPATIENT
Start: 2017-06-07 | End: 2017-06-07

## 2017-06-07 RX ORDER — DIPHENHYDRAMINE HYDROCHLORIDE 50 MG/ML
12.5 INJECTION, SOLUTION INTRAMUSCULAR; INTRAVENOUS
Status: DISCONTINUED | OUTPATIENT
Start: 2017-06-07 | End: 2017-06-07 | Stop reason: HOSPADM

## 2017-06-07 RX ORDER — ACETAMINOPHEN 10 MG/ML
1000 INJECTION, SOLUTION INTRAVENOUS ONCE
Status: COMPLETED | OUTPATIENT
Start: 2017-06-07 | End: 2017-06-07

## 2017-06-07 RX ORDER — FENTANYL CITRATE 50 UG/ML
INJECTION, SOLUTION INTRAMUSCULAR; INTRAVENOUS AS NEEDED
Status: DISCONTINUED | OUTPATIENT
Start: 2017-06-07 | End: 2017-06-07 | Stop reason: HOSPADM

## 2017-06-07 RX ORDER — INSULIN LISPRO 100 [IU]/ML
INJECTION, SOLUTION INTRAVENOUS; SUBCUTANEOUS
Status: DISCONTINUED | OUTPATIENT
Start: 2017-06-07 | End: 2017-06-07 | Stop reason: SDUPTHER

## 2017-06-07 RX ORDER — ASPIRIN 325 MG
325 TABLET, DELAYED RELEASE (ENTERIC COATED) ORAL EVERY 12 HOURS
Status: DISCONTINUED | OUTPATIENT
Start: 2017-06-07 | End: 2017-06-07

## 2017-06-07 RX ORDER — AMOXICILLIN 250 MG
2 CAPSULE ORAL DAILY
Qty: 120 TAB | Refills: 0 | Status: SHIPPED | OUTPATIENT
Start: 2017-06-08 | End: 2017-08-04

## 2017-06-07 RX ORDER — ROPIVACAINE HYDROCHLORIDE 2 MG/ML
INJECTION, SOLUTION EPIDURAL; INFILTRATION; PERINEURAL AS NEEDED
Status: DISCONTINUED | OUTPATIENT
Start: 2017-06-07 | End: 2017-06-07 | Stop reason: HOSPADM

## 2017-06-07 RX ORDER — MORPHINE SULFATE 8 MG/ML
8 INJECTION, SOLUTION INTRAMUSCULAR; INTRAVENOUS
Status: DISCONTINUED | OUTPATIENT
Start: 2017-06-07 | End: 2017-06-09 | Stop reason: HOSPADM

## 2017-06-07 RX ORDER — ALBUTEROL SULFATE 90 UG/1
1 AEROSOL, METERED RESPIRATORY (INHALATION)
Status: DISCONTINUED | OUTPATIENT
Start: 2017-06-07 | End: 2017-06-09 | Stop reason: HOSPADM

## 2017-06-07 RX ORDER — GUAIFENESIN 100 MG/5ML
81 LIQUID (ML) ORAL 2 TIMES DAILY
Qty: 60 TAB | Refills: 0 | Status: SHIPPED | OUTPATIENT
Start: 2017-06-07

## 2017-06-07 RX ORDER — ACETAMINOPHEN 500 MG
1000 TABLET ORAL ONCE
Status: DISCONTINUED | OUTPATIENT
Start: 2017-06-07 | End: 2017-06-07 | Stop reason: HOSPADM

## 2017-06-07 RX ORDER — ZOLPIDEM TARTRATE 5 MG/1
5 TABLET ORAL
Qty: 30 TAB | Refills: 0 | Status: SHIPPED | OUTPATIENT
Start: 2017-06-07

## 2017-06-07 RX ORDER — LEVOTHYROXINE SODIUM 75 UG/1
75 TABLET ORAL
Status: DISCONTINUED | OUTPATIENT
Start: 2017-06-08 | End: 2017-06-09 | Stop reason: HOSPADM

## 2017-06-07 RX ORDER — HYDROMORPHONE HYDROCHLORIDE 1 MG/ML
1 INJECTION, SOLUTION INTRAMUSCULAR; INTRAVENOUS; SUBCUTANEOUS
Status: DISCONTINUED | OUTPATIENT
Start: 2017-06-07 | End: 2017-06-07

## 2017-06-07 RX ORDER — OXYCODONE HYDROCHLORIDE 5 MG/1
10 TABLET ORAL
Status: DISCONTINUED | OUTPATIENT
Start: 2017-06-07 | End: 2017-06-09 | Stop reason: HOSPADM

## 2017-06-07 RX ORDER — DIPHENHYDRAMINE HCL 25 MG
25 CAPSULE ORAL
Status: DISCONTINUED | OUTPATIENT
Start: 2017-06-07 | End: 2017-06-09 | Stop reason: HOSPADM

## 2017-06-07 RX ORDER — HYDROCHLOROTHIAZIDE 25 MG/1
25 TABLET ORAL DAILY
Status: DISCONTINUED | OUTPATIENT
Start: 2017-06-08 | End: 2017-06-09 | Stop reason: HOSPADM

## 2017-06-07 RX ORDER — SODIUM CHLORIDE 9 MG/ML
100 INJECTION, SOLUTION INTRAVENOUS CONTINUOUS
Status: DISPENSED | OUTPATIENT
Start: 2017-06-07 | End: 2017-06-09

## 2017-06-07 RX ORDER — KETOROLAC TROMETHAMINE 15 MG/ML
15 INJECTION, SOLUTION INTRAMUSCULAR; INTRAVENOUS EVERY 8 HOURS
Status: COMPLETED | OUTPATIENT
Start: 2017-06-07 | End: 2017-06-08

## 2017-06-07 RX ORDER — SODIUM CHLORIDE 0.9 % (FLUSH) 0.9 %
5-10 SYRINGE (ML) INJECTION EVERY 8 HOURS
Status: DISCONTINUED | OUTPATIENT
Start: 2017-06-07 | End: 2017-06-07 | Stop reason: HOSPADM

## 2017-06-07 RX ORDER — CEFAZOLIN SODIUM IN 0.9 % NACL 2 G/50 ML
2 INTRAVENOUS SOLUTION, PIGGYBACK (ML) INTRAVENOUS EVERY 8 HOURS
Status: COMPLETED | OUTPATIENT
Start: 2017-06-07 | End: 2017-06-07

## 2017-06-07 RX ORDER — OXYCODONE HYDROCHLORIDE 5 MG/1
5 TABLET ORAL
Status: DISCONTINUED | OUTPATIENT
Start: 2017-06-07 | End: 2017-06-09 | Stop reason: HOSPADM

## 2017-06-07 RX ORDER — CELECOXIB 200 MG/1
200 CAPSULE ORAL EVERY 12 HOURS
Status: DISCONTINUED | OUTPATIENT
Start: 2017-06-07 | End: 2017-06-07 | Stop reason: ALTCHOICE

## 2017-06-07 RX ORDER — CEFAZOLIN SODIUM IN 0.9 % NACL 2 G/50 ML
2 INTRAVENOUS SOLUTION, PIGGYBACK (ML) INTRAVENOUS ONCE
Status: COMPLETED | OUTPATIENT
Start: 2017-06-07 | End: 2017-06-07

## 2017-06-07 RX ORDER — PROPOFOL 10 MG/ML
INJECTION, EMULSION INTRAVENOUS
Status: DISCONTINUED | OUTPATIENT
Start: 2017-06-07 | End: 2017-06-07 | Stop reason: HOSPADM

## 2017-06-07 RX ORDER — ERGOCALCIFEROL 1.25 MG/1
50000 CAPSULE ORAL
Status: DISCONTINUED | OUTPATIENT
Start: 2017-06-11 | End: 2017-06-09 | Stop reason: HOSPADM

## 2017-06-07 RX ORDER — OXYCODONE HYDROCHLORIDE 5 MG/1
10 TABLET ORAL
Status: DISCONTINUED | OUTPATIENT
Start: 2017-06-07 | End: 2017-06-07 | Stop reason: HOSPADM

## 2017-06-07 RX ORDER — MIDAZOLAM HYDROCHLORIDE 1 MG/ML
2 INJECTION, SOLUTION INTRAMUSCULAR; INTRAVENOUS ONCE
Status: COMPLETED | OUTPATIENT
Start: 2017-06-07 | End: 2017-06-07

## 2017-06-07 RX ORDER — ACETAMINOPHEN 500 MG
1000 TABLET ORAL EVERY 6 HOURS
Qty: 120 TAB | Refills: 0 | Status: SHIPPED | OUTPATIENT
Start: 2017-06-08

## 2017-06-07 RX ORDER — SODIUM CHLORIDE 0.9 % (FLUSH) 0.9 %
5-10 SYRINGE (ML) INJECTION AS NEEDED
Status: DISCONTINUED | OUTPATIENT
Start: 2017-06-07 | End: 2017-06-07 | Stop reason: HOSPADM

## 2017-06-07 RX ORDER — FLUMAZENIL 0.1 MG/ML
0.2 INJECTION INTRAVENOUS
Status: DISCONTINUED | OUTPATIENT
Start: 2017-06-07 | End: 2017-06-07 | Stop reason: HOSPADM

## 2017-06-07 RX ORDER — ACETAMINOPHEN 500 MG
1000 TABLET ORAL EVERY 6 HOURS
Status: DISCONTINUED | OUTPATIENT
Start: 2017-06-08 | End: 2017-06-09 | Stop reason: HOSPADM

## 2017-06-07 RX ORDER — HYDROMORPHONE HYDROCHLORIDE 2 MG/ML
0.5 INJECTION, SOLUTION INTRAMUSCULAR; INTRAVENOUS; SUBCUTANEOUS
Status: DISCONTINUED | OUTPATIENT
Start: 2017-06-07 | End: 2017-06-07 | Stop reason: HOSPADM

## 2017-06-07 RX ORDER — SODIUM CHLORIDE 0.9 % (FLUSH) 0.9 %
5-10 SYRINGE (ML) INJECTION EVERY 8 HOURS
Status: DISCONTINUED | OUTPATIENT
Start: 2017-06-07 | End: 2017-06-09 | Stop reason: HOSPADM

## 2017-06-07 RX ORDER — MORPHINE SULFATE 10 MG/ML
INJECTION, SOLUTION INTRAMUSCULAR; INTRAVENOUS AS NEEDED
Status: DISCONTINUED | OUTPATIENT
Start: 2017-06-07 | End: 2017-06-07 | Stop reason: HOSPADM

## 2017-06-07 RX ORDER — OXYCODONE HCL 10 MG/1
10 TABLET, FILM COATED, EXTENDED RELEASE ORAL EVERY 12 HOURS
Status: DISCONTINUED | OUTPATIENT
Start: 2017-06-07 | End: 2017-06-09 | Stop reason: HOSPADM

## 2017-06-07 RX ORDER — GABAPENTIN 100 MG/1
100 CAPSULE ORAL 2 TIMES DAILY
Status: DISCONTINUED | OUTPATIENT
Start: 2017-06-07 | End: 2017-06-09 | Stop reason: HOSPADM

## 2017-06-07 RX ORDER — INSULIN LISPRO 100 [IU]/ML
INJECTION, SOLUTION INTRAVENOUS; SUBCUTANEOUS
Status: DISCONTINUED | OUTPATIENT
Start: 2017-06-07 | End: 2017-06-09 | Stop reason: HOSPADM

## 2017-06-07 RX ORDER — NALOXONE HYDROCHLORIDE 0.4 MG/ML
0.2 INJECTION, SOLUTION INTRAMUSCULAR; INTRAVENOUS; SUBCUTANEOUS AS NEEDED
Status: DISCONTINUED | OUTPATIENT
Start: 2017-06-07 | End: 2017-06-07 | Stop reason: HOSPADM

## 2017-06-07 RX ORDER — GABAPENTIN 100 MG/1
100 CAPSULE ORAL 2 TIMES DAILY
Qty: 60 CAP | Refills: 0 | Status: SHIPPED | OUTPATIENT
Start: 2017-06-07

## 2017-06-07 RX ORDER — OXYCODONE HYDROCHLORIDE 5 MG/1
5 TABLET ORAL
Status: DISCONTINUED | OUTPATIENT
Start: 2017-06-07 | End: 2017-06-07 | Stop reason: HOSPADM

## 2017-06-07 RX ORDER — LIDOCAINE HYDROCHLORIDE 10 MG/ML
0.1 INJECTION INFILTRATION; PERINEURAL AS NEEDED
Status: DISCONTINUED | OUTPATIENT
Start: 2017-06-07 | End: 2017-06-07 | Stop reason: HOSPADM

## 2017-06-07 RX ORDER — MIDAZOLAM HYDROCHLORIDE 1 MG/ML
INJECTION, SOLUTION INTRAMUSCULAR; INTRAVENOUS AS NEEDED
Status: DISCONTINUED | OUTPATIENT
Start: 2017-06-07 | End: 2017-06-07 | Stop reason: HOSPADM

## 2017-06-07 RX ORDER — ZOLPIDEM TARTRATE 5 MG/1
5 TABLET ORAL
Status: DISCONTINUED | OUTPATIENT
Start: 2017-06-07 | End: 2017-06-09 | Stop reason: HOSPADM

## 2017-06-07 RX ORDER — POVIDONE-IODINE 10 %
SOLUTION, NON-ORAL TOPICAL
Status: COMPLETED | OUTPATIENT
Start: 2017-06-07 | End: 2017-06-07

## 2017-06-07 RX ORDER — ONDANSETRON 2 MG/ML
INJECTION INTRAMUSCULAR; INTRAVENOUS AS NEEDED
Status: DISCONTINUED | OUTPATIENT
Start: 2017-06-07 | End: 2017-06-07 | Stop reason: HOSPADM

## 2017-06-07 RX ORDER — TRANEXAMIC ACID 100 MG/ML
INJECTION, SOLUTION INTRAVENOUS AS NEEDED
Status: DISCONTINUED | OUTPATIENT
Start: 2017-06-07 | End: 2017-06-07 | Stop reason: HOSPADM

## 2017-06-07 RX ORDER — MIDAZOLAM HYDROCHLORIDE 1 MG/ML
2 INJECTION, SOLUTION INTRAMUSCULAR; INTRAVENOUS
Status: DISCONTINUED | OUTPATIENT
Start: 2017-06-07 | End: 2017-06-07 | Stop reason: HOSPADM

## 2017-06-07 RX ORDER — TRANEXAMIC ACID 650 1/1
1300 TABLET ORAL DAILY
Status: DISCONTINUED | OUTPATIENT
Start: 2017-06-08 | End: 2017-06-09 | Stop reason: HOSPADM

## 2017-06-07 RX ORDER — HYDROMORPHONE HYDROCHLORIDE 2 MG/1
2 TABLET ORAL
Qty: 90 TAB | Refills: 0 | Status: SHIPPED | OUTPATIENT
Start: 2017-06-07 | End: 2017-08-04

## 2017-06-07 RX ADMIN — LIDOCAINE HYDROCHLORIDE 0.1 ML: 10 INJECTION, SOLUTION INFILTRATION; PERINEURAL at 07:51

## 2017-06-07 RX ADMIN — TRANEXAMIC ACID 1000 MG: 100 INJECTION, SOLUTION INTRAVENOUS at 11:01

## 2017-06-07 RX ADMIN — OXYCODONE HYDROCHLORIDE 10 MG: 10 TABLET, FILM COATED, EXTENDED RELEASE ORAL at 21:02

## 2017-06-07 RX ADMIN — SODIUM CHLORIDE, SODIUM LACTATE, POTASSIUM CHLORIDE, AND CALCIUM CHLORIDE: 600; 310; 30; 20 INJECTION, SOLUTION INTRAVENOUS at 09:14

## 2017-06-07 RX ADMIN — ONDANSETRON 8 MG: 8 TABLET, ORALLY DISINTEGRATING ORAL at 16:09

## 2017-06-07 RX ADMIN — MIDAZOLAM HYDROCHLORIDE 1 MG: 1 INJECTION, SOLUTION INTRAMUSCULAR; INTRAVENOUS at 09:20

## 2017-06-07 RX ADMIN — OXYCODONE HYDROCHLORIDE 10 MG: 5 TABLET ORAL at 21:02

## 2017-06-07 RX ADMIN — SODIUM CHLORIDE, SODIUM LACTATE, POTASSIUM CHLORIDE, AND CALCIUM CHLORIDE 100 ML/HR: 600; 310; 30; 20 INJECTION, SOLUTION INTRAVENOUS at 11:38

## 2017-06-07 RX ADMIN — BUPIVACAINE HYDROCHLORIDE 1.8 ML: 7.5 INJECTION, SOLUTION INTRASPINAL at 09:23

## 2017-06-07 RX ADMIN — MIDAZOLAM HYDROCHLORIDE 1 MG: 1 INJECTION, SOLUTION INTRAMUSCULAR; INTRAVENOUS at 09:17

## 2017-06-07 RX ADMIN — TRANEXAMIC ACID 1000 MG: 100 INJECTION, SOLUTION INTRAVENOUS at 09:22

## 2017-06-07 RX ADMIN — SODIUM CHLORIDE, SODIUM LACTATE, POTASSIUM CHLORIDE, AND CALCIUM CHLORIDE: 600; 310; 30; 20 INJECTION, SOLUTION INTRAVENOUS at 09:44

## 2017-06-07 RX ADMIN — TUBERCULIN PURIFIED PROTEIN DERIVATIVE 5 UNITS: 5 INJECTION, SOLUTION INTRADERMAL at 07:52

## 2017-06-07 RX ADMIN — ASPIRIN 81 MG: 81 TABLET, COATED ORAL at 21:02

## 2017-06-07 RX ADMIN — SODIUM CHLORIDE 100 ML/HR: 900 INJECTION, SOLUTION INTRAVENOUS at 13:21

## 2017-06-07 RX ADMIN — PROPOFOL: 10 INJECTION, EMULSION INTRAVENOUS at 10:19

## 2017-06-07 RX ADMIN — CYCLOBENZAPRINE HYDROCHLORIDE 5 MG: 10 TABLET, FILM COATED ORAL at 17:47

## 2017-06-07 RX ADMIN — Medication 4 MG: at 15:25

## 2017-06-07 RX ADMIN — ACETAMINOPHEN 1000 MG: 10 INJECTION, SOLUTION INTRAVENOUS at 17:44

## 2017-06-07 RX ADMIN — FENTANYL CITRATE 50 MCG: 50 INJECTION, SOLUTION INTRAMUSCULAR; INTRAVENOUS at 09:21

## 2017-06-07 RX ADMIN — ONDANSETRON 4 MG: 2 INJECTION INTRAMUSCULAR; INTRAVENOUS at 09:31

## 2017-06-07 RX ADMIN — GABAPENTIN 100 MG: 100 CAPSULE ORAL at 17:45

## 2017-06-07 RX ADMIN — PROPOFOL 25 MCG/KG/MIN: 10 INJECTION, EMULSION INTRAVENOUS at 09:27

## 2017-06-07 RX ADMIN — FENTANYL CITRATE 50 MCG: 50 INJECTION, SOLUTION INTRAMUSCULAR; INTRAVENOUS at 09:18

## 2017-06-07 RX ADMIN — KETOROLAC TROMETHAMINE 15 MG: 15 INJECTION, SOLUTION INTRAMUSCULAR; INTRAVENOUS at 17:45

## 2017-06-07 RX ADMIN — ROPIVACAINE HYDROCHLORIDE 25 ML: 2 INJECTION, SOLUTION EPIDURAL; INFILTRATION; PERINEURAL at 09:03

## 2017-06-07 RX ADMIN — HYDROMORPHONE HYDROCHLORIDE 2 MG: 2 TABLET ORAL at 13:21

## 2017-06-07 RX ADMIN — CYCLOBENZAPRINE HYDROCHLORIDE 5 MG: 10 TABLET, FILM COATED ORAL at 21:01

## 2017-06-07 RX ADMIN — MONTELUKAST SODIUM 10 MG: 10 TABLET, FILM COATED ORAL at 21:02

## 2017-06-07 RX ADMIN — CEFAZOLIN 2 G: 1 INJECTION, POWDER, FOR SOLUTION INTRAMUSCULAR; INTRAVENOUS; PARENTERAL at 21:01

## 2017-06-07 RX ADMIN — Medication 4 MG: at 15:48

## 2017-06-07 RX ADMIN — CEFAZOLIN 2 G: 1 INJECTION, POWDER, FOR SOLUTION INTRAMUSCULAR; INTRAVENOUS; PARENTERAL at 13:21

## 2017-06-07 RX ADMIN — SODIUM CHLORIDE, SODIUM LACTATE, POTASSIUM CHLORIDE, AND CALCIUM CHLORIDE 100 ML/HR: 600; 310; 30; 20 INJECTION, SOLUTION INTRAVENOUS at 07:50

## 2017-06-07 RX ADMIN — MIDAZOLAM HYDROCHLORIDE 2 MG: 1 INJECTION, SOLUTION INTRAMUSCULAR; INTRAVENOUS at 08:58

## 2017-06-07 RX ADMIN — POVIDONE-IODINE: 10 SOLUTION TOPICAL at 07:52

## 2017-06-07 RX ADMIN — HYDROMORPHONE HYDROCHLORIDE 1 MG: 1 INJECTION, SOLUTION INTRAMUSCULAR; INTRAVENOUS; SUBCUTANEOUS at 13:04

## 2017-06-07 RX ADMIN — FENTANYL CITRATE 100 MCG: 50 INJECTION, SOLUTION INTRAMUSCULAR; INTRAVENOUS at 08:58

## 2017-06-07 RX ADMIN — CEFAZOLIN 2 G: 1 INJECTION, POWDER, FOR SOLUTION INTRAMUSCULAR; INTRAVENOUS; PARENTERAL at 09:16

## 2017-06-07 NOTE — PERIOP NOTES
TRANSFER - OUT REPORT:    Verbal report given to Nicol(name) on Siobhan Obrien  being transferred to [facility] area(unit) for routine progression of care       Report consisted of patients Situation, Background, Assessment and   Recommendations(SBAR). Information from the following report(s) SBAR and MAR was reviewed with the receiving nurse. Lines:   Peripheral IV 06/07/17 Left Hand (Active)   Site Assessment Clean, dry, & intact 6/7/2017  7:41 AM   Phlebitis Assessment 0 6/7/2017  7:41 AM   Dressing Status Clean, dry, & intact 6/7/2017  7:41 AM   Dressing Type Transparent;Tape 6/7/2017  7:41 AM   Hub Color/Line Status Green; Infusing 6/7/2017  7:41 AM        Opportunity for questions and clarification was provided.       Patient transported with:   Tech   [id number]

## 2017-06-07 NOTE — ANESTHESIA POSTPROCEDURE EVALUATION
Post-Anesthesia Evaluation and Assessment    Patient: Kathy Marroquin MRN: [id number]  SSN: [id number]    YOB: 1950  Age: 74 y.o. Sex: female       Cardiovascular Function/Vital Signs  Visit Vitals    /56    Pulse 65    Temp 37.6 °C (99.6 °F)    Resp 16    Ht 5' 5\" (1.651 m)    Wt 98.1 kg (216 lb 3.2 oz)    SpO2 97%    BMI 35.98 kg/m2       Patient is status post spinal anesthesia for Procedure(s):  LEFT KNEE ARTHROPLASTY TOTAL . Nausea/Vomiting: None    Postoperative hydration reviewed and adequate. Pain:  Pain Scale 1: Numeric (0 - 10) (06/07/17 1153)  Pain Intensity 1: 0 (06/07/17 1153)   Managed    Neurological Status:   Neuro (WDL): Exceptions to WDL (06/07/17 1153)  Neuro  Neurologic State: Drowsy (06/07/17 1153)  Orientation Level: Oriented X4 (06/07/17 1153)  Cognition: Follows commands (06/07/17 1153)  Speech: Clear (06/07/17 1153)  LUE Motor Response: Purposeful (06/07/17 1153)  LLE Motor Response: Numbness (06/07/17 1153)  RUE Motor Response: Purposeful (06/07/17 1153)  RLE Motor Response: Numbness (06/07/17 1153)   At baseline    Mental Status and Level of Consciousness: Arousable    Pulmonary Status:   O2 Device: Nasal cannula (06/07/17 1108)   Adequate oxygenation and airway patent    Complications related to anesthesia: None    Post-anesthesia assessment completed.  No concerns    Signed By: Belvie Oppenheim, MD     June 7, 2017

## 2017-06-07 NOTE — PROGRESS NOTES
Patient's nausea has decreased since zofran 8 mg was taken PO, pain control is much better with Morphine replacing the dilaudid IV.

## 2017-06-07 NOTE — PROGRESS NOTES
Problem: Self Care Deficits Care Plan (Adult)  Goal: *Acute Goals and Plan of Care (Insert Text)  GOALS:   DISCHARGE GOALS (in preparation for going home/rehab): 3 days  1. Ms. Tierney Strauss will perform one lower body dressing activity with minimal assistance required to demonstrate improved functional mobility and safety. 2. Ms. Tierney Strauss will perform one lower body bathing activity with minimal assistance required to demonstrate improved functional mobility and safety. 3. Ms. Tierney Strauss will perform toileting/toilet transfer with contact guard assistance to demonstrate improved functional mobility and safety. 4. Ms. Tierney Strauss will perform shower transfer with contact guard assistance to demonstrate improved functional mobility and safety. JOINT CAMP OCCUPATIONAL THERAPY TKA: Initial Assessment 6/7/2017  INPATIENT: Hospital Day: 1  Payor: BLUE CROSS MEDICARE / [place] / Product Type: [employer] Medicare /      NAME/AGE/GENDER: Zach Snell is a 74 y.o. female     PRIMARY DIAGNOSIS:  Primary localized osteoarthritis of left knee [M17.12]              Procedure(s) and Anesthesia Type:     * LEFT KNEE ARTHROPLASTY TOTAL  - Spinal (Left)  ICD-10: Treatment Diagnosis:        · Pain in Left Knee (M25.562)  · Stiffness of Left Knee, Not elsewhere classified (P07.468)       ASSESSMENT:      Ms. Tierney Strauss is s/p L TKA and presents with decreased weight bearing on L LE and decreased independence with functional mobility and activities of daily living. Patient would benefit from skilled Occupational Therapy to maximize independence and safety with self-care task and functional mobility. Pt would also benefit from education on adaptive equipment and safety precautions in preparation for going home or for recommendations for post-hospital rehab program.  Patient plans for further rehab at home with home health services and good family support. OT reviewed therapy schedule and plan of care with patient. Patient was able to transfer and preform self care skills as charted below. Patient instructed to call for assistance when needing to get up from the bed and all needs in reach. Patient verbalized understanding of call light. This section established at most recent assessment   PROBLEM LIST (Impairments causing functional limitations):  1. Decreased Strength  2. Decreased ADL/Functional Activities  3. Decreased Transfer Abilities  4. Increased Pain  5. Increased Fatigue  6. Decreased Flexibility/Joint Mobility  7. Decreased Knowledge of Precautions    INTERVENTIONS PLANNED: (Benefits and precautions of occupational therapy have been discussed with the patient.)  1. Activities of daily living training  2. Adaptive equipment training  3. Balance training  4. Clothing management  5. Donning&doffing training  6. Theraputic activity      TREATMENT PLAN: Frequency/Duration: Follow patient qd to address above goals. Rehabilitation Potential For Stated Goals: GOOD      RECOMMENDED REHABILITATION/EQUIPMENT: (at time of discharge pending progress): Continue Skilled Therapy and Home Health: Physical Therapy. OCCUPATIONAL PROFILE AND HISTORY:   History of Present Injury/Illness (Reason for Referral): Pt presents this date s/p (L) TKA. Past Medical History/Comorbidities:   Ms. Ivis Zamarripa  has a past medical history of Asthma; Diabetes ([address] (2012); Hypertension; Morbid obesity ([address] Thromboembolus ([address] Thyroid disease; and Unspecified adverse effect of anesthesia. She also has no past medical history of Adverse effect of anesthesia; Difficult intubation; Endocarditis; Malignant hyperthermia due to anesthesia; Nausea & vomiting; Nicotine vapor product user; Non-nicotine vapor product user; Pseudocholinesterase deficiency; or Rheumatic fever.   Ms. Ivis Zamarripa  has a past surgical history that includes gyn; neurological procedure unlisted; carpal tunnel release (1984); orthopaedic; appendectomy (1966); heent; and refractive surgery (2001). Social History/Living Environment:   Patient Expects to be Discharged to[de-identified] Other (comment) (to Or)  Prior Level of Function/Work/Activity:  independent   Number of Personal Factors/Comorbidities that affect the Plan of Care: Brief history (0):  LOW COMPLEXITY   ASSESSMENT OF OCCUPATIONAL PERFORMANCE[de-identified]   Most Recent Physical Functioning:   Balance  Sitting: Intact  Standing: Pull to stand; With support        Patient Vitals for the past 6 hrs:       BP BP Patient Position SpO2 O2 Flow Rate (L/min) Pulse   06/07/17 0839 170/81 At rest 97 % 2 l/min 68   06/07/17 0858 179/79 At rest 99 % 2 l/min 79   06/07/17 0903 188/84 At rest 96 % 2 l/min 80   06/07/17 0908 183/82 At rest 95 % 2 l/min 77   06/07/17 1108 130/60 At rest 97 % 2 l/min 81   06/07/17 1113 147/67 - 98 % - 80   06/07/17 1118 142/65 - 97 % - 76   06/07/17 1123 133/70 - 96 % - 72   06/07/17 1138 127/69 - 97 % - 65   06/07/17 1153 123/57 - 97 % - 69   06/07/17 1208 126/56 - 97 % - 65   06/07/17 1252 (!) 145/92 - 97 % - 71   06/07/17 1300 - - 98 % - -   06/07/17 1340 - - 95 % - -        Gross Assessment: Yes  Gross Assessment  AROM: Within functional limits (BUE)  Strength: Within functional limits (BUE)  Coordination: Within functional limits (BUE)  Tone: Normal  Sensation: Intact                    Vision  Acuity: Within Defined Limits     Mental Status  Neurologic State: Alert  Orientation Level: Oriented X4  Cognition: Follows commands  Perception: Appears intact  Perseveration: No perseveration noted  Safety/Judgement: Awareness of environment; Fall prevention                    Basic ADLs (From Assessment) Complex ADLs (From Assessment)   Basic ADL  Feeding: Setup  Oral Facial Hygiene/Grooming: Setup  Bathing:  Moderate assistance  Upper Body Dressing: Setup  Lower Body Dressing: Maximum assistance  Toileting: Maximum assistance     Grooming/Bathing/Dressing Activities of Daily Living     Cognitive Retraining  Safety/Judgement: Awareness of environment; Fall prevention                 Functional Transfers  Toilet Transfer : Minimum assistance;Assist x2  Shower Transfer: Minimum assistance;Assist x2     Bed/Mat Mobility  Supine to Sit: Contact guard assistance  Sit to Supine: Contact guard assistance  Sit to Stand: Minimum assistance;Assist x2  Bed to Chair: Minimum assistance;Assist x2           Physical Skills Involved:  1. Balance  2. Strength  3. Activity Tolerance Cognitive Skills Affected (resulting in the inability to perform in a timely and safe manner): 1. none Psychosocial Skills Affected:  1. none   Number of elements that affect the Plan of Care: 1-3:  LOW COMPLEXITY   CLINICAL DECISION MAKIN [facility] Mobility Inpatient Short Form  How much help from another person does the patient currently need. .. Total A Lot A Little None   1. Putting on and taking off regular lower body clothing?   [ ] 1   [X] 2   [ ] 3   [ ] 4   2. Bathing (including washing, rinsing, drying)? [ ] 1   [X] 2   [ ] 3   [ ] 4   3. Toileting, which includes using toilet, bedpan or urinal?   [ ] 1   [X] 2   [ ] 3   [ ] 4   4. Putting on and taking off regular upper body clothing?   [ ] 1   [ ] 2   [X] 3   [ ] 4   5. Taking care of personal grooming such as brushing teeth? [ ] 1   [ ] 2   [X] 3   [ ] 4   6. Eating meals? [ ] 1   [ ] 2   [X] 3   [ ] 4   © , Trustees of [address] [place], [place] [address], under license to [employer]. All rights reserved   Score:  Initial: 15 Most Recent: X (Date: -- )     Interpretation of Tool:  Represents activities that are increasingly more difficult (i.e. Bed mobility, Transfers, Gait).        Score 24 23 22-20 19-15 14-10 9-7 6       Modifier CH CI CJ CK CL CM CN         · Self Care:               - CURRENT STATUS:    CK - 40%-59% impaired, limited or restricted               - GOAL STATUS:           CJ - 20%-39% impaired, limited or restricted  - D/C STATUS:                       ---------------To be determined---------------  Payor: BLUE CROSS MEDICARE / [place] / Product Type: Managed Care Medicare /       Medical Necessity:     · Patient is expected to demonstrate progress in strength, balance, coordination and functional technique to increase independence with self care and functional mobility. Reason for Services/Other Comments:  · Patient continues to require skilled intervention due to decrease self care and mobility. Use of outcome tool(s) and clinical judgement create a POC that gives a: LOW COMPLEXITY                 TREATMENT:   (In addition to Assessment/Re-Assessment sessions the following treatments were rendered)      Pre-treatment Symptoms/Complaints:  nausea  Pain: Initial:   Pain Intensity 1: 9  Pain Location 1: Knee  Pain Orientation 1: Left  Pain Intervention(s) 1: Repositioned  Post Session:  7      Assessment/Reassessment only, no treatment provided today     Treatment/Session Assessment:         Response to Treatment:  Tolerated well. Education:  [ ] Home Exercises  [X] Fall Precautions  [ ] Hip Precautions [ ] Going Home Video  [X] Knee/Hip Prosthesis Review  [X] Walker Management/Safety [X] Adaptive Equipment as Needed         Interdisciplinary Collaboration:   · Physical Therapist  · Occupational Therapist  · Registered Nurse     After treatment position/precautions:   · Supine in bed  · Bed/Chair-wheels locked  · Bed in low position  · Call light within reach  · RN notified  · Side rails x 3     Compliance with Program/Exercises: Will assess as treatment progresses. Recommendations/Intent for next treatment session:  Treatment next visit will focus on increasing Ms. Domingo's independence with bed mobility, transfers, gait training, strength/ROM exercises, self care, modalities for pain, and patient education.        Total Treatment Duration:  OT Patient Time In/Time Out  Time In: 1330  Time Out: 1130 Venecia Finn

## 2017-06-07 NOTE — PROGRESS NOTES
06/07/17 1300   Oxygen Therapy   O2 Sat (%) 98 %   Pulse via Oximetry 75 beats per minute   O2 Device Nasal cannula  (weaned to RA.)   Patient achieved  1500   Ml/sec on IS. Patient encouraged to do every hour while awake-patient agreed and demonstrated. No shortness of breath or distress noted. BS are clear b/l. oxinet #8 at bedside.

## 2017-06-07 NOTE — PROGRESS NOTES
TRANSFER - IN REPORT:    Verbal report received from [facility]) on Odette Smith  being received from PACU (unit) for routine progression of care      Report consisted of patients Situation, Background, Assessment and   Recommendations(SBAR). Information from the following report(s) SBAR, Kardex, Intake/Output and Recent Results was reviewed with the receiving nurse. Opportunity for questions and clarification was provided. Assessment will be completed upon patients arrival to unit and care assumed.

## 2017-06-07 NOTE — PROGRESS NOTES
Problem: Mobility Impaired (Adult and Pediatric)  Goal: *Acute Goals and Plan of Care (Insert Text)  GOALS (1-4 days):  (1.)Ms. Domingo will move from supine to sit and sit to supine in bed with INDEPENDENT. (2.)Ms. Domingo will transfer from bed to chair and chair to bed with INDEPENDENT using the least restrictive device. (3.)Ms. Domingo will ambulate with INDEPENDENT for 250 feet with the least restrictive device. (4.)Ms. Domingo will ambulate up/down 3 steps with bilateral railing with MINIMAL ASSIST with no device. (5.)Ms. Domingo will increase left knee ROM to 5°-80°.  ________________________________________________________________________________________________  Outcome: Progressing Towards Goal      PHYSICAL THERAPY JOINT CAMP TKA: INITIAL ASSESSMENT 6/7/2017  INPATIENT: Hospital Day: 1  Payor: BLUE CROSS MEDIC[place] / [place] / Product Type: [employer] Medicare /      NAME/AGE/GENDER: Kj Navas is a 74 y.o. female     PRIMARY DIAGNOSIS:  Primary localized osteoarthritis of left knee [M17.12]              Procedure(s) and Anesthesia Type:     * LEFT KNEE ARTHROPLASTY TOTAL  - Spinal (Left)  ICD-10: Treatment Diagnosis:        · Pain in Left Knee (M25.562)  · Stiffness of Left Knee, Not elsewhere classified (M25.662)  · Difficulty in walking, Not elsewhere classified (R26.2)  · Other abnormalities of gait and mobility (R26.89)       ASSESSMENT:      Ms. Dudley Foley presents status post left total knee replacement with decreased independence with bed mobility, transfers, gait, and therapeutic exercise. Therapy will maximize independence with functional mobility. Pt agreeable to take steps to head of bed, requested to take steps to foot of bed, then took steps back to head of bed. Pt's mobility appears limited by nausea today. This section established at most recent assessment   PROBLEM LIST (Impairments causing functional limitations):  1. Decreased Strength  2.  Decreased Transfer Abilities  3. Decreased Ambulation Ability/Technique  4. Decreased Balance  5. Increased Pain  6. Decreased Activity Tolerance  7. Decreased Flexibility/Joint Mobility    INTERVENTIONS PLANNED: (Benefits and precautions of physical therapy have been discussed with the patient.)  1. Bed Mobility  2. Gait Training  3. Home Exercise Program (HEP)  4. Therapeutic Exercise/Strengthening  5. Transfer Training  6. Range of Motion: active/assisted/passive  7. Therapeutic Activities  8. Group Therapy      TREATMENT PLAN: Frequency/Duration: Follow patient BID   to address above goals. Rehabilitation Potential For Stated Goals: GOOD      RECOMMENDED REHABILITATION/EQUIPMENT: (at time of discharge pending progress): Home Health: Physical Therapy. HISTORY:   History of Present Injury/Illness (Reason for Referral):  Pt with decreased independence with functional mobility. Past Medical History/Comorbidities:   Ms. Zandra Jesus  has a past medical history of Asthma; Diabetes ([address] (2012); Hypertension; Morbid obesity ([address] Thromboembolus ([address] Thyroid disease; and Unspecified adverse effect of anesthesia. She also has no past medical history of Adverse effect of anesthesia; Difficult intubation; Endocarditis; Malignant hyperthermia due to anesthesia; Nausea & vomiting; Nicotine vapor product user; Non-nicotine vapor product user; Pseudocholinesterase deficiency; or Rheumatic fever. Ms. Zandra Jesus  has a past surgical history that includes gyn; neurological procedure unlisted; carpal tunnel release (1984); orthopaedic; appendectomy (1966); heent; and refractive surgery (2001).   Social History/Living Environment:   Patient Expects to be Discharged to[de-identified] Other (comment) (to Or)  Prior Level of Function/Work/Activity:  Independent with ambulation and ADLs   Number of Personal Factors/Comorbidities that affect the Plan of Care: 0: LOW COMPLEXITY   EXAMINATION:   Most Recent Physical Functioning:   Gross Assessment: Yes  Gross Assessment  AROM: Generally decreased, functional  Strength: Generally decreased, functional  Coordination: Generally decreased, functional  Tone: Normal  Sensation: Intact           LLE AROM  L Knee Flexion: 90  L Knee Extension: 30            Bed Mobility  Supine to Sit: Contact guard assistance  Sit to Supine: Contact guard assistance     Transfers  Sit to Stand: Minimum assistance;Assist x2  Stand to Sit: Minimum assistance;Assist x2  Bed to Chair: Minimum assistance;Assist x2     Balance  Sitting: Intact  Standing: Pull to stand; With support    Posture  Posture (WDL): Within defined limits           Weight Bearing Status  Left Side Weight Bearing: As tolerated  Distance (ft): 8 Feet (ft) (at edge of bed)  Ambulation - Level of Assistance: Minimal assistance;Assist x2; Additional time  Assistive Device: Walker, rolling  Gait Abnormalities: Antalgic         Braces/Orthotics: none     Left Knee Cold  Type: Cryocuff       Body Structures Involved:  1. Bones  2. Joints  3. Muscles Body Functions Affected:  1. Neuromusculoskeletal  2. Movement Related Activities and Participation Affected:  1. Mobility   Number of elements that affect the Plan of Care: 4+: HIGH COMPLEXITY   CLINICAL PRESENTATION:   Presentation: Stable and uncomplicated: LOW COMPLEXITY   CLINICAL DECISION MAKIN[address] [place] [address]-PAC 6 Clicks   Basic Mobility Inpatient Short Form  How much difficulty does the patient currently have. .. Unable A Lot A Little None   1. Turning over in bed (including adjusting bedclothes, sheets and blankets)? [ ] 1   [ ] 2   [X] 3   [ ] 4   2. Sitting down on and standing up from a chair with arms ( e.g., wheelchair, bedside commode, etc.)   [ ] 1   [ ] 2   [X] 3   [ ] 4   3. Moving from lying on back to sitting on the side of the bed? [ ] 1   [ ] 2   [X] 3   [ ] 4   How much help from another person does the patient currently need. .. Total A Lot A Little None   4.   Moving to and from a bed to a chair (including a wheelchair)? [ ] 1   [ ] 2   [X] 3   [ ] 4   5. Need to walk in hospital room? [ ] 1   [ ] 2   [X] 3   [ ] 4   6. Climbing 3-5 steps with a railing? [ ] 1   [ ] 2   [X] 3   [ ] 4   © 2007, Trustees of [address] [place] [address] [address] [address], under license to [employer]. All rights reserved       Score:  Initial: 18 Most Recent: X (Date: -- )     Interpretation of Tool:  Represents activities that are increasingly more difficult (i.e. Bed mobility, Transfers, Gait). Score 24 23 22-20 19-15 14-10 9-7 6       Modifier CH CI CJ CK CL CM CN         · Mobility - Walking and Moving Around:               - CURRENT STATUS:    CK - 40%-59% impaired, limited or restricted               - GOAL STATUS:           CK - 40%-59% impaired, limited or restricted               - D/C STATUS:                       CK - 40%-59% impaired, limited or restricted  Payor: BLUE CROSS MEDICARE / [place] / Product Type: Managed Care Medicare /       Medical Necessity:     · Skilled intervention continues to be required due to decreased mobility ability. Reason for Services/Other Comments:  · Patient continues to require skilled intervention due to decreased mobility ability. Use of outcome tool(s) and clinical judgement create a POC that gives a: Clear prediction of patient's progress: LOW COMPLEXITY                 TREATMENT:   (In addition to Assessment/Re-Assessment sessions the following treatments were rendered)      Pre-treatment Symptoms/Complaints:  Pt complains of nausea.   Pain: Initial:   Pain Intensity 1: 9  Pain Location 1: Knee  Pain Orientation 1: Left  Post Session:  No complaints      Assessment/Reassessment only, no treatment provided today        Date:    Date:    Date:      ACTIVITY/EXERCISE AM PM AM PM AM PM   GROUP THERAPY  [ ]  [ ]  [ ]  [ ]  [ ]  [ ]   Ankle Pumps               Quad Sets               Gluteal Sets               Hip ABd/ADduction Straight Leg Raises               Knee Slides               Short Arc Quads               Long Arc Quads               Chair Slides                               B = bilateral; AA = active assistive; A = active; P = passive       Treatment/Session Assessment:         Response to Treatment:  Pt agreeable to take steps to head of bed, down to foot, up to head of bed. Education:  [ ] Home Exercises  [X] Fall Precautions  [X] Hip Precautions [ ] Going Home Video  [ ] Knee/Hip Prosthesis Review  [ ] April Leghorn Management/Safety [ ] Adaptive Equipment as Needed         Interdisciplinary Collaboration:   · Physical Therapist  · Occupational Therapist  · Registered Nurse     After treatment position/precautions:   · Supine in bed  · Bed/Chair-wheels locked  · Bed in low position  · Caregiver at bedside  · Call light within reach  · RN notified     Compliance with Program/Exercises: compliant most of the time. Recommendations/Intent for next treatment session:  Treatment next visit will focus on increasing Ms. Domingo's independence with bed mobility, transfers, gait training, strength/ROM exercises, modalities for pain, and patient education.        Total Treatment Duration:  PT Patient Time In/Time Out  Time In: 1340  Time Out: [address] [facility] [address], PT

## 2017-06-07 NOTE — ANESTHESIA PROCEDURE NOTES
Peripheral Block    Start time: 6/7/2017 8:58 AM  End time: 6/7/2017 9:03 AM  Performed by: Festus Real  Authorized by: Festus Real       Pre-procedure: Indications: at surgeon's request and post-op pain management    Preanesthetic Checklist: patient identified, risks and benefits discussed, site marked, timeout performed, anesthesia consent given and patient being monitored      Block Type:   Block Type:   Adductor canal  Laterality:  Left  Monitoring:  Continuous pulse ox, frequent vital sign checks, heart rate, responsive to questions and oxygen  Injection Technique:  Single shot  Procedures: ultrasound guided    Prep: chlorhexidine    Location:  Mid thigh  Needle Type:  Stimuplex  Needle Gauge:  20 G  Needle Localization:  Anatomical landmarks, infiltration and ultrasound guidance  Medication Injected:  0.5%  ropivacaine  Volume (mL):  25    Assessment:  Number of attempts:  1  Injection Assessment:  Incremental injection every 5 mL, negative aspiration for CSF, local visualized surrounding nerve on ultrasound, negative aspiration for blood, no intravascular symptoms, no paresthesia and ultrasound image on chart  Patient tolerance:  Patient tolerated the procedure well with no immediate complications

## 2017-06-07 NOTE — PERIOP NOTES
TRANSFER - OUT REPORT:    Verbal report given to Marquis Brain RN on Ibis Tucker  being transferred to [facility] for routine progression of care       Report consisted of patients Situation, Background, Assessment and   Recommendations(SBAR). Information from the following report(s) SBAR was reviewed with the receiving nurse. Lines:   Peripheral IV 06/07/17 Left Hand (Active)   Site Assessment Clean, dry, & intact 6/7/2017 12:00 PM   Phlebitis Assessment 0 6/7/2017 12:00 PM   Infiltration Assessment 0 6/7/2017 12:00 PM   Dressing Status Clean, dry, & intact 6/7/2017 12:00 PM   Dressing Type Tape;Transparent 6/7/2017 12:00 PM   Hub Color/Line Status Green; Infusing 6/7/2017 12:00 PM        Opportunity for questions and clarification was provided.       Patient transported with:   O2 @ 2 liters

## 2017-06-07 NOTE — IP AVS SNAPSHOT
[address] 
[phone] Patient: Sonny Dominguez MRN: [id number] :1950 You are allergic to the following Allergen Reactions Other Medication Contact Dermatitis Other (comments) Neosporin/sulfa/bacitracin \"all ointments- creams are OK\"- 
Blisters Pt reports she cannot use any ointments; she reports the base in ointments causes blisters. Sulfa (Sulfonamide Antibiotics) Rash Ultram (Tramadol) Nausea and Vomiting Immunizations Administered for This Admission Name Date  
 TB Skin Test (PPD) Intradermal 2017 Recent Documentation Height Weight Breastfeeding? BMI OB Status Smoking Status 1.651 m 98.1 kg No 35.98 kg/m2 Hysterectomy Former Smoker Emergency Contacts Name Discharge Info Relation Home Work Mobile [address] CAREGIVER [3] Son [22]   [phone] About your hospitalization You were admitted on:  2017 You last received care in the:  [facility]. [place] [address] You were discharged on:  2017 Unit phone number:  [phone] Why you were hospitalized Your primary diagnosis was:  Not on File Your diagnoses also included:  Oa (Osteoarthritis) Of Knee Providers Seen During Your Hospitalizations Provider Role Specialty Primary office phone Jamal Cantrell MD Attending Provider Orthopedic Surgery [phone] Your Primary Care Physician (PCP) Primary Care Physician Office Phone Office Fax Gianluca Teixeira [phone] Follow-up Information Follow up With Details Comments Contact Info Vladimir Green MD  As needed [address] [address] 
[phone] Jamal Cantrell MD  As scheduled by office [address] [place] [address] 
[phone] [address]  Will contact you within 48 hrs [address] [facility] [address] [place] [id number] 
[phone] Current Discharge Medication List  
  
START taking these medications Dose & Instructions Dispensing Information Comments Morning Noon Evening Bedtime  
 acetaminophen 500 mg tablet Commonly known as:  TYLENOL Your next dose is: Today Dose:  1000 mg Take 2 Tabs by mouth every six (6) hours. Quantity:  120 Tab Refills:  0  
     
   
   
  
   
  
 aspirin 81 mg chewable tablet Your next dose is: Today Dose:  81 mg Take 1 Tab by mouth two (2) times a day. Quantity:  60 Tab Refills:  0  
     
   
   
  
   
  
 cyclobenzaprine 10 mg tablet Commonly known as:  FLEXERIL Your next dose is: Today Dose:  5 mg Take 0.5 Tabs by mouth three (3) times daily. Quantity:  60 Tab Refills:  0  
     
   
   
  
   
  
 gabapentin 100 mg capsule Commonly known as:  NEURONTIN Your next dose is: Today Dose:  100 mg Take 1 Cap by mouth two (2) times a day. Quantity:  60 Cap Refills:  0 HYDROmorphone 2 mg tablet Commonly known as:  DILAUDID Your next dose is: Today Dose:  2 mg Take 1 Tab by mouth every four (4) hours as needed. Max Daily Amount: 12 mg. Quantity:  90 Tab Refills:  0  
     
   
   
  
   
  
 ondansetron 8 mg disintegrating tablet Commonly known as:  ZOFRAN ODT Your next dose is: Today Dose:  8 mg Take 1 Tab by mouth every eight (8) hours as needed. Quantity:  60 Tab Refills:  0  
     
   
   
  
   
  
 oxyCODONE IR 5 mg immediate release tablet Commonly known as:  Sindhu Jelena Your next dose is: Today Dose:  5 mg Take 1 Tab by mouth every three (3) hours as needed. Max Daily Amount: 40 mg.  
 Quantity:  90 Tab Refills:  0  
     
   
   
  
   
  
 senna-docusate 8.6-50 mg per tablet Commonly known as:  Dino Felipe Your next dose is:  Tomorrow Dose:  2 Tab Take 2 Tabs by mouth daily. Quantity:  120 Tab Refills:  0  
     
  
   
   
   
  
 zolpidem 5 mg tablet Commonly known as:  AMBIEN Your next dose is: Today Dose:  5 mg Take 1 Tab by mouth nightly as needed for Sleep. Max Daily Amount: 5 mg. Quantity:  30 Tab Refills:  0 CONTINUE these medications which have CHANGED Dose & Instructions Dispensing Information Comments Morning Noon Evening Bedtime  
 albuterol 90 mcg/actuation inhaler Commonly known as:  PROVENTIL HFA What changed:  additional instructions Your next dose is: Today Dose:  1 Puff Take 1 Puff by inhalation every four (4) hours as needed. Quantity:  3 Inhaler Refills:  1  
     
   
   
  
   
  
 ergocalciferol 50,000 unit capsule Commonly known as:  ERGOCALCIFEROL What changed:  additional instructions Your next dose is:  As scheduled Dose:  36982 Units Take 1 Cap by mouth every seven (7) days. Indications: VITAMIN D DEFICIENCY (HIGH DOSE THERAPY) Quantity:  12 Cap Refills:  2 CONTINUE these medications which have NOT CHANGED Dose & Instructions Dispensing Information Comments Morning Noon Evening Bedtime  
 amLODIPine-benazepril 5-10 mg per capsule Commonly known as:  Dorothy Landsberg Your next dose is:  Tomorrow Dose:  1 Cap Take 1 Cap by mouth daily. Quantity:  90 Cap Refills:  3  
     
  
   
   
   
  
 hydroCHLOROthiazide 25 mg tablet Commonly known as:  HYDRODIURIL Your next dose is:  Tomorrow Dose:  25 mg Take 1 Tab by mouth daily. Indications: am  
 Quantity:  90 Tab Refills:  3  
     
  
   
   
   
  
 levothyroxine 75 mcg tablet Commonly known as:  SYNTHROID Your next dose is:  Tomorrow TAKE 1 TABLET DAILY BEFORE BREAKFAST Quantity:  90 Tab Refills:  2 linagliptin-metformin 5-1,000 mg Tbph  
Commonly known as:  JENTADUETO XR Your next dose is:  Tomorrow Dose:  1 Tab Take 1 Tab by mouth daily. Quantity:  90 Tab Refills:  3  
     
  
   
   
   
  
 montelukast 10 mg tablet Commonly known as:  SINGULAIR Your next dose is:  Tomorrow TAKE 1 TABLET DAILY Quantity:  90 Tab Refills:  3 Where to Get Your Medications Information on where to get these meds will be given to you by the nurse or doctor. ! Ask your nurse or doctor about these medications  
  acetaminophen 500 mg tablet  
 aspirin 81 mg chewable tablet  
 cyclobenzaprine 10 mg tablet  
 gabapentin 100 mg capsule HYDROmorphone 2 mg tablet  
 ondansetron 8 mg disintegrating tablet  
 oxyCODONE IR 5 mg immediate release tablet  
 senna-docusate 8.6-50 mg per tablet  
 zolpidem 5 mg tablet Discharge Instructions [facility] [employer] Patient Discharge Instructions Talha Navarro / [id number] : 1950 Admitted 2017 Discharged: 2017 IF YOU HAVE ANY PROBLEMS ONCE YOU ARE AT HOME CALL THE FOLLOWING NUMBERS:  
Main office number: [phone] Take Home Medications · It is important that you take the medication exactly as they are prescribed. · Keep your medication in the bottles provided by the pharmacist and keep a list of the medication names, dosages, and times to be taken in your wallet. · Do not take other medications without consulting your doctor. What to do at [facility] Resume your prehospital diet. If you have excessive nausea or vomitting call your doctor's office Home Physical Therapy is arranged. Use rolling walker when walking. Use Damian Hose stockings until we see you in the office for your follow up appointment with Dr. Amber Garrett Patients who have had a joint replacement should not drive until you are seen for your follow up appointment by Dr. Amber Garrett. When to Call - Call if you have a temperature greater then 101 
- Unable to keep food down - Loose control of your bladder or bowel function - Are unable to bear any weight  
- Need a pain medication refill DISCHARGE SUMMARY from Nurse The following personal items collected during your admission are returned to you:  
Dental Appliance: Dental Appliances: None Vision: Visual Aid: Glasses Hearing Aid:   na 
Jewelry: Jewelry: None Clothing: Clothing: At bedside Other Valuables: Other Valuables: Cell Phone Valuables sent to safe: Personal Items Sent to Safe: n/a PATIENT INSTRUCTIONS: 
 
After general anesthesia or intravenous sedation, for 24 hours or while taking prescription Narcotics: · Limit your activities · Do not drive and operate hazardous machinery · Do not make important personal or business decisions · Do  not drink alcoholic beverages · If you have not urinated within 8 hours after discharge, please contact your surgeon on call. Report the following to your surgeon: 
· Excessive pain, swelling, redness or odor of or around the surgical area · Temperature over 101 · Nausea and vomiting lasting longer than 4 hours or if unable to take medications · Any signs of decreased circulation or nerve impairment to extremity: change in color, persistent  numbness, tingling, coldness or increase pain · Any questions, call office @ [phone] Keep scheduled follow up appointment. If need to change, call office @ [phone]. *  Please give a list of your current medications to your Primary Care Provider. *  Please update this list whenever your medications are discontinued, doses are 
    changed, or new medications (including over-the-counter products) are added. *  Please carry medication information at all times in case of emergency situations. Total Knee Replacement: What to Expect at Home Your Recovery When you leave the hospital, you should be able to move around with a walker or crutches. But you will need someone to help you at home for the next few weeks or until you have more energy and can move around better. If there is no one to help you at home, you may go to a rehabilitation center. You will go home with a bandage and stitches or staples. Change the bandage as your doctor tells you to. Your doctor will remove your stitches or staples 10 to 21 days after your surgery. You may still have some mild pain, and the area may be swollen for 3 to 6 months after surgery. Your knee will continue to improve for 6 to 12 months. You will probably use a walker for 1 to 3 weeks and then use crutches. When you are ready, you can use a cane. You will probably be able to walk on your own in 4 to 8 weeks. You will need to do months of physical rehabilitation (rehab) after a knee replacement. Rehab will help you strengthen the muscles of the knee and help you regain movement. After you recover, your artificial knee will allow you to do normal daily activities with less pain or no pain at all. You may be able to hike, dance, ride a bike, and play golf. Talk to your doctor about whether you can do more strenuous activities. Always tell your caregivers that you have an artificial knee. How long it will take to walk on your own, return to normal activities, and go back to work depends on your health and how well your rehabilitation (rehab) program goes. The better you do with your rehab exercises, the quicker you will get your strength and movement back. This care sheet gives you a general idea about how long it will take for you to recover. But each person recovers at a different pace. Follow the steps below to get better as quickly as possible. How can you care for yourself at home? Activity · Rest when you feel tired.  You may take a nap, but do not stay in bed all day. When you sit, use a chair with arms. You can use the arms to help you stand up. · Work with your physical therapist to find the best way to exercise. You may be able to take frequent, short walks using crutches or a walker. What you can do as your knee heals will depend on whether your new knee is cemented or uncemented. You may not be able to do certain things for a while if your new knee is uncemented. · After your knee has healed enough, you can do more strenuous activities with caution. ¨ You can golf, but use a golf cart, and do not wear shoes with spikes. ¨ You can bike on a flat road or on a stationary bike. Avoid biking up hills. ¨ Your doctor may suggest that you stay away from activities that put stress on your knee. These include tennis or badminton, squash or racquetball, contact sports like football, jumping (such as in basketball), jogging, or running. ¨ Avoid activities where you might fall. These include horseback riding, skiing, and mountain biking. · Do not sit for more than 1 hour at a time. Get up and walk around for a while before you sit again. If you must sit for a long time, prop up your leg with a chair or footstool. This will help you avoid swelling. · Ask your doctor when you can shower. You may need to take sponge baths until your stitches or staples have been removed. · Ask your doctor when you can drive again. It may take up to 8 weeks after knee replacement surgery before it is safe for you to drive. · When you get into a car, sit on the edge of the seat. Then pull in your legs, and turn to face the front. · You should be able to do many everyday activities 3 to 6 weeks after your surgery. You will probably need to take 4 to 16 weeks off from work. When you can go back to work depends on the type of work you do and how you feel. · Ask your doctor when it is okay for you to have sex. · Do not lift anything heavier than 10 pounds and do not lift weights for 12 weeks. Diet 
· By the time you leave the hospital, you should be eating your normal diet. If your stomach is upset, try bland, low-fat foods like plain rice, broiled chicken, toast, and yogurt. Your doctor may suggest that you take iron and vitamin supplements. · Drink plenty of fluids (unless your doctor tells you not to). · Eat healthy foods, and watch your portion sizes. Try to stay at your ideal weight. Too much weight puts more stress on your new knee. · You may notice that your bowel movements are not regular right after your surgery. This is common. Try to avoid constipation and straining with bowel movements. You may want to take a fiber supplement every day. If you have not had a bowel movement after a couple of days, ask your doctor about taking a mild laxative. Medicines · Your doctor will tell you if and when you can restart your medicines. He or she will also give you instructions about taking any new medicines. · If you take blood thinners, such as warfarin (Coumadin), clopidogrel (Plavix), or aspirin, be sure to talk to your doctor. He or she will tell you if and when to start taking those medicines again. Make sure that you understand exactly what your doctor wants you to do. · Your doctor may give you a blood-thinning medicine to prevent blood clots. If you take a blood thinner, be sure you get instructions about how to take your medicine safely. Blood thinners can cause serious bleeding problems. This medicine could be in pill form or as a shot (injection). If a shot is necessary, your doctor will tell you how to do this. · Be safe with medicines. Take pain medicines exactly as directed. ¨ If the doctor gave you a prescription medicine for pain, take it as prescribed. ¨ If you are not taking a prescription pain medicine, ask your doctor if you can take an over-the-counter medicine. ¨ Plan to take your pain medicine 30 minutes before exercises.  It is easier to prevent pain before it starts than to stop it once it has started. · If you think your pain medicine is making you sick to your stomach: 
¨ Take your medicine after meals (unless your doctor has told you not to). ¨ Ask your doctor for a different pain medicine. · If your doctor prescribed antibiotics, take them as directed. Do not stop taking them just because you feel better. You need to take the full course of antibiotics. Incision care · You will have a bandage over the cut (incision) and staples or stitches. Take the bandage off when your doctor says it is okay. · Your doctor will remove the staples or stitches 10 days to 3 weeks after the surgery and replace them with strips of tape. Leave the tape on for a week or until it falls off. Exercise · Your rehab program will give you a number of exercises to do to help you get back your knee's range of motion and strength. Always do them as your therapist tells you. Ice and elevation · For pain and swelling, put ice or a cold pack on the area for 10 to 20 minutes at a time. Put a thin cloth between the ice and your skin. Other instructions · Continue to wear your support stockings as your doctor says. These help to prevent blood clots. The length of time that you will have to wear them depends on your activity level and the amount of swelling. · Wear medical alert jewelry that says you may need antibiotics before any procedure, including dental work. You can buy this at most drugstores. · You have metal pieces in your knee. These may set off some airport metal detectors. Carry a medical alert card that says you have an artificial joint, just in case. Follow-up care is a key part of your treatment and safety. Be sure to make and go to all appointments, and call your doctor if you are having problems. It's also a good idea to know your test results and keep a list of the medicines you take. When should you call for help? Call 911 anytime you think you may need emergency care. For example, call if: 
· You passed out (lost consciousness). · You have severe trouble breathing. · You have sudden chest pain and shortness of breath, or you cough up blood. Call your doctor now or seek immediate medical care if: 
· You have signs of infection, such as: 
¨ Increased pain, swelling, warmth, or redness. ¨ Red streaks leading from the incision. ¨ Pus draining from the incision. ¨ A fever. · You have signs of a blood clot, such as: 
¨ Pain in your calf, back of the knee, thigh, or groin. ¨ Redness and swelling in your leg or groin. · Your incision comes open and begins to bleed, or the bleeding increases. · You have pain that does not get better after you take pain medicine. Watch closely for changes in your health, and be sure to contact your doctor if: 
· You do not have a bowel movement after taking a laxative. Where can you learn more? Go to http://mars-cynthia.info/. Enter Y273 in the search box to learn more about \"Total Knee Replacement: What to Expect at Home. \" Current as of: August 4, 2016 Content Version: 11.2 © 3828-4756 [employer]. Care instructions adapted under license by [employer] (which disclaims liability or warranty for this information). If you have questions about a medical condition or this instruction, always ask your healthcare professional. [place] [address] any warranty or liability for your use of this information. These are general instructions for a healthy lifestyle: No smoking/ No tobacco products/ Avoid exposure to second hand smoke Surgeon General's Warning:  Quitting smoking now greatly reduces serious risk to your health. Obesity, smoking, and sedentary lifestyle greatly increases your risk for illness A healthy diet, regular physical exercise & weight monitoring are important for maintaining a healthy lifestyle You may be retaining fluid if you have a history of heart failure or if you experience any of the following symptoms:  Weight gain of 3 pounds or more overnight or 5 pounds in a week, increased swelling in our hands or feet or shortness of breath while lying flat in bed. Please call your doctor as soon as you notice any of these symptoms; do not wait until your next office visit. Recognize signs and symptoms of STROKE: 
 
F-face looks uneven A-arms unable to move or move even S-speech slurred or non-existent T-time-call 911 as soon as signs and symptoms begin-DO NOT go Back to bed or wait to see if you get better-TIME IS BRAIN. The discharge information has been reviewed with the patient. The patient verbalized understanding. Information obtained by : 
I understand that if any problems occur once I am at home I am to contact my physician. I understand and acknowledge receipt of the instructions indicated above. Physician's or R.N.'s Signature                                                                  Date/Time Patient or Representative Signature                                                          Date/Time Discharge Orders None Introducing [facility] SERVICES! Maykel James introduces [employer] patient portal. Now you can access parts of your medical record, email your doctor's office, and request medication refills online. 1. In your internet browser, go to [email or web address] 2. Click on the First Time User? Click Here link in the Sign In box. You will see the New Member Sign Up page. 3. Enter your [employer] Access Code exactly as it appears below.  You will not need to use this code after youve completed the sign-up process. If you do not sign up before the expiration date, you must request a new code. · [employer] Access Code: [email or web address] Expires: 8/13/2017  7:37 AM 
 
4. Enter the last four digits of your Social Security Number (xxxx) and Date of Birth (mm/dd/yyyy) as indicated and click Submit. You will be taken to the next sign-up page. 5. Create a [employer] ID. This will be your [employer] login ID and cannot be changed, so think of one that is secure and easy to remember. 6. Create a [employer] password. You can change your password at any time. 7. Enter your Password Reset Question and Answer. This can be used at a later time if you forget your password. 8. Enter your e-mail address. You will receive e-mail notification when new information is available in [address] 9. Click Sign Up. You can now view and download portions of your medical record. 10. Click the Download Summary menu link to download a portable copy of your medical information. If you have questions, please visit the Frequently Asked Questions section of the [employer] website. Remember, [employer] is NOT to be used for urgent needs. For medical emergencies, dial 911. Now available from your iPhone and Android! General Information Please provide this summary of care documentation to your next provider. Patient Signature:  ____________________________________________________________ Date:  ____________________________________________________________  
  
Rica Bustillos Provider Signature:  ____________________________________________________________ Date:  ____________________________________________________________

## 2017-06-07 NOTE — ANESTHESIA PROCEDURE NOTES
Spinal Block    Start time: 6/7/2017 9:21 AM  End time: 6/7/2017 9:23 AM  Performed by: Warren Ordonez  Authorized by: Warren Ordonez     Pre-procedure:   Indications: at surgeon's request and primary anesthetic  Preanesthetic Checklist: patient identified, risks and benefits discussed, anesthesia consent, site marked, patient being monitored and timeout performed    Timeout Time: 09:19          Spinal Block:   Patient Position:  Seated  Prep Region:  Lumbar  Prep: chlorhexidine      Location:  L3-4  Technique:  Single shot  Local:  Lidocaine 1%      Needle:   Needle Type:  Pencan  Needle Gauge:  25 G  Attempts:  1      Events: CSF confirmed, no blood with aspiration and no paresthesia        Assessment:  Insertion:  Uncomplicated  Patient tolerance:  Patient tolerated the procedure well with no immediate complications

## 2017-06-07 NOTE — OP NOTES
[facility]  Total Knee Arthroplasty  Patient:Rosanna Mccall   : 1950  Medical Record [id number]      Pre-operative Diagnosis:  Primary localized osteoarthritis of left knee [M17.12]  Post-operative Diagnosis: Primary localized osteoarthritis of left kn  Location: [facility]    Date of Procedure: 2017  Surgeon: Jamal Cantrell MD  Assistant: None    Anesthesia: Spinal and adductor nerve block    Procedure:  Left Total Knee Arthroplasty     Tourniquet Time: 0 minutes    EBL: less than 166ST    Complications: None    Patient Condition upon Completion of Procedure: Stable    Implants:   Implant Name Type Inv. Item Serial No.  Lot No. LRB No. Used   COMPNT FEM CR TRIATHLN 4 L PA --  - [id number]  COMPNT FEM CR TRIATHLN 4 L PA --  [id number] [employer] [facility] [id number] Left 1   PAT ASYM MTL-BK 10MM SZ A35 -- TRIATHLON - SAYPA  PAT ASYM MTL-BK 10MM SZ A35 -- TRIATHLON AYPA [employer] [facility] [id number] Left 1   BASEPLT TIB PC TRITNM SZ 5 -- TRIATHLON - [id number]  BASEPLT TIB PC TRITNM SZ 5 -- TRIATHLON [id number] [employer] [facility] [id number] Left 1   INSERT TIB CR TRIATHLN 5 11MM --  - [id number]   INSERT TIB CR TRIATHLN 5 11MM --  [id number] [employer] [facility] [id number] Left 1       Intra-Operative Findings: Pre-operatively we assesd the motion of the patients knee and noted that the knee lacked approximately 10 degrees of extension. Intra-operatively we noted that the articular surfaces were arthritis with cartilage loss of both the medial and lateral compartments. The patella was examined and found to be in poor condition. The patella was resurfaced. With trial components in place we assessed knee motion and found that the knee was able to fully extend. Flexion was felt to be 140 degrees. Description of Procedure:    Sonny Dominguez was brought to the operating room. A adductor canal block had been done in the preop holding area.  Antibiotics were given per proticol preoperatively. After proper identification was performed  spinal anesthesia was administered. A short catheter was placed and one gram tranexemic acid was given. The patient was positioned supine on the operating room table. The left leg and was then prepped and draped in the usual sterile manner. Prior to the incision being made a timeout was called identifying the patient, procedure, operative side and surgeon. An anterior longitudinal incision was made just medial to the tibial tubercle and extending proximal.  A medial parapatellar capsular incision was performed. The medial capsular flap was elevated around to the midcoronal plane. The patella was subluxed laterally and patellar fat pat partially excised. The knee was flexed and externally rotated. The articular surface revealed cartilage loss with exposed bone and bone spurs throughout all three compartments. The anterior cruciate ligament was resected. We first addressed our distal femoral resection. Using intramedullary instrumentation we resected 8mm of distal femur using a 5 degrees valgus cut angle. Medial and lateral condylar cuts were verified to be level using the capture of the distal femoral cutting jig. We next addressed our proximal tibia. Using extramedullary intrstrumentation we resected 2mm of bone as measured from the more involved compartment. Our cut was verified to be perpendicular to the meachanical axis of the tibia as referenced from the tibial tubercle, the long axis of the tibia, the center of the ankle, and the patients 2nd toe. Our slope was cut with the goal of 0-3 degrees posterior slope. At this point a spacer block was used to verify that the knee was able to obtain full extension and was balanced in extension.  We did  have to resect additional distal femur (2mm) to achieve this goal. Once the knee was felt to fully extend and showed good balance the distal femoral pins were removed and we moved on to finishing our distal femoral preparation. Prior to sizing our distal femur we marked Babbitt's Line and our transepicondylar axis. Using sizing instrumentation the femur was sized to a #4 component however we had to shift block up the component to avoid notching. The anterior and posterior cuts along with the anterior and posterior chamfer cuts were then made using the 4-in-1 cutting block. Osteophytes were removed from the tibial and femoral surfaces. We assessed the PCL and felt it to be stable and in good condition. Medial and lateral meniscus were removed along any redundant tissue. Any posterior osteophytes were removed carefully. The tibia was then sized to a #5 component. The tibial base plate was pinned into place with what we felt to be appropriate rotation with the center of the tibial tray bisecting the function between the medial and middle 1/3rd of the tibial tubercle. We then performed a trial of the knee. We felt that with a 11mm mm polyethylene trial in place the knee had acceptable varus and valgus stability throughout arc of motion, was able to fully extend, was not too tight in flexion, and had acceptable stability with anterior drawer testing. Additional surgical release of the posterior capsule was done to allow full extension. Again our PCL was assessed and felt to be in good condition and was felt to be very stable. The patella was then everted and examined. The patella was felt to be in poor condition and the decision was made to  resurface the patella. Bone was resected to accomodate a size 35mm patella button. A trial reduction revealed patella tended to track lateral for which we did a small lateral release. The trial polyethylene component and femoral component were removed. Again we assessed the position and rotation of the tibial tray. We did not have to adjust its position.  Once we verified that its position was acceptable the proximal tibia was punched and drilled per protocol for a cementless tibia. All trial components were removed and cut surfaces were irrigated and debrided of loose bone and/or soft tissue. A #5 [place] [employer] cementless tritanium-backed tibial component was impacted into place with good seating noted at time of final impaction. We then impacted a #4 [place] [employer] cementless hydroxyapatite-backed femoral component into place with good seating noted of the implant at final impaction. We then inserted our final polyethylene component which was 11 mm thick. We then inserted a 35mm tritanium-backed cementless patellar component. The patellar component was noted to be fully seated after compression. We did assess the patellar components fixation and ability to be elevated using a tonsil and felt the component to be very stable. Catalina Baldwin knee was placed through a range of motion and noted to be stable as mentioned above with the trial components. We again checked patellar tracking and felt the patellar component tracked well within the trochlear groove. A lavage of diluted betadine solution of 17.5 ml Betadine in 500 of 0.9% normal saline was allowed to soak in the wound for 3 minutes after implanting of the prosthesis. The wound was irrigated with saline again before closure. We then carefully injected periarticular cocktail about and capsule and subcutaneous tissue. In addition, one additional gram of IV transexemic acid was given at completion of the case for a total of two grams for the procedure. No drain was placed. The capsular layer was closed using a combination of 0-Stratafix bidirectional barbed suture and #2 Fiberwire, while the subcutaneous layers were closed using a 2-0 Vicryl interrupted suture. The skin was closed using. A sterile dressing was applied. A cryo pad was applied on the operative leg. The sponge count and needle counts were correct.  Patient was transferred to the hospital stretcher and transported to recovery in stable condition.       Signed By: Anthony Whitten MD

## 2017-06-07 NOTE — PERIOP NOTES
TRANSFER - IN REPORT:    Verbal report received from [address] [facility] [address], RN (name) on Rexene Reasons  being received from joint [place] (unit) for routine progression of care      Report consisted of patients Situation, Background, Assessment and   Recommendations(SBAR). Information from the following report(s) SBAR, Kardex and Recent Results was reviewed with the receiving nurse. Opportunity for questions and clarification was provided.

## 2017-06-08 LAB
ANION GAP BLD CALC-SCNC: 4 MMOL/L (ref 7–16)
BUN SERPL-MCNC: 11 MG/DL (ref 8–23)
CALCIUM SERPL-MCNC: 8 MG/DL (ref 8.3–10.4)
CHLORIDE SERPL-SCNC: 103 MMOL/L (ref 98–107)
CO2 SERPL-SCNC: 33 MMOL/L (ref 21–32)
CREAT SERPL-MCNC: 0.72 MG/DL (ref 0.6–1)
GLUCOSE BLD STRIP.AUTO-MCNC: 137 MG/DL (ref 65–100)
GLUCOSE BLD STRIP.AUTO-MCNC: 141 MG/DL (ref 65–100)
GLUCOSE BLD STRIP.AUTO-MCNC: 198 MG/DL (ref 65–100)
GLUCOSE SERPL-MCNC: 167 MG/DL (ref 65–100)
HGB BLD-MCNC: 10.7 G/DL (ref 11.7–15.4)
POTASSIUM SERPL-SCNC: 3.8 MMOL/L (ref 3.5–5.1)
SODIUM SERPL-SCNC: 140 MMOL/L (ref 136–145)

## 2017-06-08 PROCEDURE — 97110 THERAPEUTIC EXERCISES: CPT

## 2017-06-08 PROCEDURE — 82962 GLUCOSE BLOOD TEST: CPT

## 2017-06-08 PROCEDURE — 97150 GROUP THERAPEUTIC PROCEDURES: CPT

## 2017-06-08 PROCEDURE — 80048 BASIC METABOLIC PNL TOTAL CA: CPT | Performed by: ORTHOPAEDIC SURGERY

## 2017-06-08 PROCEDURE — 74011250636 HC RX REV CODE- 250/636: Performed by: ORTHOPAEDIC SURGERY

## 2017-06-08 PROCEDURE — 65270000029 HC RM PRIVATE

## 2017-06-08 PROCEDURE — 85018 HEMOGLOBIN: CPT | Performed by: ORTHOPAEDIC SURGERY

## 2017-06-08 PROCEDURE — 97116 GAIT TRAINING THERAPY: CPT

## 2017-06-08 PROCEDURE — 97535 SELF CARE MNGMENT TRAINING: CPT

## 2017-06-08 PROCEDURE — 36415 COLL VENOUS BLD VENIPUNCTURE: CPT | Performed by: ORTHOPAEDIC SURGERY

## 2017-06-08 PROCEDURE — 94762 N-INVAS EAR/PLS OXIMTRY CONT: CPT

## 2017-06-08 PROCEDURE — 74011250637 HC RX REV CODE- 250/637: Performed by: ORTHOPAEDIC SURGERY

## 2017-06-08 RX ORDER — ONDANSETRON 8 MG/1
8 TABLET, ORALLY DISINTEGRATING ORAL
Qty: 60 TAB | Refills: 0 | Status: SHIPPED | OUTPATIENT
Start: 2017-06-08 | End: 2017-08-04

## 2017-06-08 RX ORDER — OXYCODONE HYDROCHLORIDE 5 MG/1
5 TABLET ORAL
Qty: 90 TAB | Refills: 0 | Status: SHIPPED | OUTPATIENT
Start: 2017-06-08

## 2017-06-08 RX ADMIN — OXYCODONE HYDROCHLORIDE 10 MG: 5 TABLET ORAL at 01:05

## 2017-06-08 RX ADMIN — OXYCODONE HYDROCHLORIDE 10 MG: 5 TABLET ORAL at 20:24

## 2017-06-08 RX ADMIN — ACETAMINOPHEN 1000 MG: 500 TABLET, FILM COATED ORAL at 05:45

## 2017-06-08 RX ADMIN — Medication 4 MG: at 20:49

## 2017-06-08 RX ADMIN — ACETAMINOPHEN 1000 MG: 500 TABLET, FILM COATED ORAL at 01:05

## 2017-06-08 RX ADMIN — OXYCODONE HYDROCHLORIDE 10 MG: 10 TABLET, FILM COATED, EXTENDED RELEASE ORAL at 20:49

## 2017-06-08 RX ADMIN — LEVOTHYROXINE SODIUM 75 MCG: 75 TABLET ORAL at 05:45

## 2017-06-08 RX ADMIN — Medication 10 ML: at 20:50

## 2017-06-08 RX ADMIN — KETOROLAC TROMETHAMINE 15 MG: 15 INJECTION, SOLUTION INTRAMUSCULAR; INTRAVENOUS at 01:05

## 2017-06-08 RX ADMIN — CYCLOBENZAPRINE HYDROCHLORIDE 5 MG: 10 TABLET, FILM COATED ORAL at 22:02

## 2017-06-08 RX ADMIN — GABAPENTIN 100 MG: 100 CAPSULE ORAL at 08:40

## 2017-06-08 RX ADMIN — HYDROCHLOROTHIAZIDE 25 MG: 25 TABLET ORAL at 08:40

## 2017-06-08 RX ADMIN — KETOROLAC TROMETHAMINE 15 MG: 15 INJECTION, SOLUTION INTRAMUSCULAR; INTRAVENOUS at 10:17

## 2017-06-08 RX ADMIN — ACETAMINOPHEN 1000 MG: 500 TABLET, FILM COATED ORAL at 21:55

## 2017-06-08 RX ADMIN — CYCLOBENZAPRINE HYDROCHLORIDE 5 MG: 10 TABLET, FILM COATED ORAL at 17:32

## 2017-06-08 RX ADMIN — CYCLOBENZAPRINE HYDROCHLORIDE 5 MG: 10 TABLET, FILM COATED ORAL at 08:33

## 2017-06-08 RX ADMIN — OXYCODONE HYDROCHLORIDE 10 MG: 10 TABLET, FILM COATED, EXTENDED RELEASE ORAL at 08:32

## 2017-06-08 RX ADMIN — ACETAMINOPHEN 1000 MG: 500 TABLET, FILM COATED ORAL at 11:49

## 2017-06-08 RX ADMIN — OXYCODONE HYDROCHLORIDE 10 MG: 5 TABLET ORAL at 08:34

## 2017-06-08 RX ADMIN — TRANEXAMIC ACID 1300 MG: 650 TABLET ORAL at 08:32

## 2017-06-08 RX ADMIN — Medication 10 ML: at 01:09

## 2017-06-08 RX ADMIN — SENNOSIDES AND DOCUSATE SODIUM 2 TABLET: 8.6; 5 TABLET ORAL at 08:33

## 2017-06-08 RX ADMIN — GABAPENTIN 100 MG: 100 CAPSULE ORAL at 17:32

## 2017-06-08 RX ADMIN — ASPIRIN 81 MG: 81 TABLET, COATED ORAL at 20:48

## 2017-06-08 RX ADMIN — BENAZEPRIL HYDROCHLORIDE: 10 TABLET ORAL at 08:33

## 2017-06-08 RX ADMIN — OXYCODONE HYDROCHLORIDE 10 MG: 5 TABLET ORAL at 11:49

## 2017-06-08 RX ADMIN — MONTELUKAST SODIUM 10 MG: 10 TABLET, FILM COATED ORAL at 22:02

## 2017-06-08 RX ADMIN — OXYCODONE HYDROCHLORIDE 10 MG: 5 TABLET ORAL at 05:45

## 2017-06-08 RX ADMIN — ASPIRIN 81 MG: 81 TABLET, COATED ORAL at 08:33

## 2017-06-08 RX ADMIN — ONDANSETRON 8 MG: 8 TABLET, ORALLY DISINTEGRATING ORAL at 21:05

## 2017-06-08 RX ADMIN — Medication 10 ML: at 10:22

## 2017-06-08 NOTE — PROGRESS NOTES
Problem: Self Care Deficits Care Plan (Adult)  Goal: *Acute Goals and Plan of Care (Insert Text)  GOALS: GOAL MET 6/8/2017  DISCHARGE GOALS (in preparation for going home/rehab): 3 days  1. Ms. Kristan Webber will perform one lower body dressing activity with minimal assistance required to demonstrate improved functional mobility and safety. 2. Ms. Kristan Webber will perform one lower body bathing activity with minimal assistance required to demonstrate improved functional mobility and safety. 3. Ms. Kristan Webber will perform toileting/toilet transfer with contact guard assistance to demonstrate improved functional mobility and safety. 4. Ms. Kristan Webber will perform shower transfer with contact guard assistance to demonstrate improved functional mobility and safety. JOINT CAMP OCCUPATIONAL THERAPY TKA: Daily Note and Discharge 6/8/2017  INPATIENT: Hospital Day: 2  Payor: BLUE CROSS MEDICARE / Plan[place] / Product Type: Year Up Care Medicare /      NAME/AGE/GENDER: Justin Jackson is a 74 y.o. female     PRIMARY DIAGNOSIS:  Primary localized osteoarthritis of left knee [M17.12]              Procedure(s) and Anesthesia Type:     * LEFT KNEE ARTHROPLASTY TOTAL  - Spinal (Left)  ICD-10: Treatment Diagnosis:        · Pain in Left Knee (M25.562)  · Stiffness of Left Knee, Not elsewhere classified (U94.825)       ASSESSMENT:    Ms. Kristan Webber is s/p left TKA and presents with decreased weight bearing on left LE and decreased independence with functional mobility and activities of daily living. Patient completed shower and dressing as charter below in ADL grid and is ambulating with rolling walker and stand by assist.  Patient has met 4/4 goals and plans to return home with good family support. Family able to provide patient with appropriate level of assistance at this time. OT reviewed safety precautions throughout session and therapy schedule for the remainder of today.   Patient instructed to call for assistance when needing to get up from recliner and all needs in reach. Patient verbalized understanding of call light. This section established at most recent assessment   PROBLEM LIST (Impairments causing functional limitations):  1. Decreased Strength  2. Decreased ADL/Functional Activities  3. Decreased Transfer Abilities  4. Increased Pain  5. Increased Fatigue  6. Decreased Flexibility/Joint Mobility  7. Decreased Knowledge of Precautions    INTERVENTIONS PLANNED: (Benefits and precautions of occupational therapy have been discussed with the patient.)  1. Activities of daily living training  2. Adaptive equipment training  3. Balance training  4. Clothing management  5. Donning&doffing training  6. Theraputic activity      TREATMENT PLAN: Frequency/Duration: Follow patient qd to address above goals. Rehabilitation Potential For Stated Goals: GOOD      RECOMMENDED REHABILITATION/EQUIPMENT: (at time of discharge pending progress): Continue Skilled Therapy and Home Health: Physical Therapy. OCCUPATIONAL PROFILE AND HISTORY:   History of Present Injury/Illness (Reason for Referral): Pt presents this date s/p (L) TKA. Past Medical History/Comorbidities:   Ms. Kuldeep Plummer  has a past medical history of Asthma; Diabetes ([address] (2012); Hypertension; Morbid obesity ([address] Thromboembolus ([address] Thyroid disease; and Unspecified adverse effect of anesthesia. She also has no past medical history of Adverse effect of anesthesia; Difficult intubation; Endocarditis; Malignant hyperthermia due to anesthesia; Nausea & vomiting; Nicotine vapor product user; Non-nicotine vapor product user; Pseudocholinesterase deficiency; or Rheumatic fever. Ms. Kuldeep Plummer  has a past surgical history that includes gyn; neurological procedure unlisted; carpal tunnel release (1984); orthopaedic; appendectomy (1966); heent; and refractive surgery (2001).   Social History/Living Environment:   Home Environment: Private residence  # Steps to Enter: 1  One/Two Story Residence: One story  Living Alone: Yes  Support Systems: Family member(s)  Patient Expects to be Discharged to[de-identified] Private residence  Current DME Used/Available at Home: None  Prior Level of Function/Work/Activity:  independent   Number of Personal Factors/Comorbidities that affect the Plan of Care: Brief history (0):  LOW COMPLEXITY   ASSESSMENT OF OCCUPATIONAL PERFORMANCE[de-identified]   Most Recent Physical Functioning:   Balance  Sitting: Intact  Standing: Intact; With support        Patient Vitals for the past 6 hrs:   BP SpO2 Pulse   06/08/17 0749 146/73 99 % 60   06/08/17 0835 - 94 % -   06/08/17 1213 124/65 90 % 67                                   Mental Status  Neurologic State: Alert; Appropriate for age  Orientation Level: Oriented X4  Cognition: Appropriate decision making; Appropriate for age attention/concentration; Appropriate safety awareness                    Basic ADLs (From Assessment) Complex ADLs (From Assessment)   Basic ADL  Feeding: Setup  Oral Facial Hygiene/Grooming: Setup  Bathing: Moderate assistance  Upper Body Dressing: Setup  Lower Body Dressing: Maximum assistance  Toileting: Maximum assistance     Grooming/Bathing/Dressing Activities of Daily Living   Grooming  Grooming Assistance: Supervision/set up (standing at sink)     Upper Body Bathing  Bathing Assistance: Supervision/set-up (seated on shower chair)     Lower Body Bathing  Bathing Assistance: Minimum assistance (seated on shower chair) Toileting  Toileting Assistance: Stand-by assistance  Adaptive Equipment: Elevated seat;Grab bars; Walker   Upper Body Dressing Assistance  Dressing Assistance: Supervision/set-up Functional Transfers  Toilet Transfer : Stand-by asssistance  Shower Transfer: Stand-by asssistance  Cues: Verbal cues provided  Adaptive Equipment: Grab bars; Walker (comment); Shower chair with back   Lower Body Dressing Assistance  Dressing Assistance: Minimum assistance Bed/Mat Mobility  Supine to Sit: Contact guard assistance  Sit to Supine:  (left up in chair)  Sit to Stand: Contact guard assistance           Physical Skills Involved:  1. Balance  2. Strength  3. Activity Tolerance Cognitive Skills Affected (resulting in the inability to perform in a timely and safe manner): 1. none Psychosocial Skills Affected:  1. none   Number of elements that affect the Plan of Care: 1-3:  LOW COMPLEXITY   CLINICAL DECISION MAKIN [facility] Mobility Inpatient Short Form  How much help from another person does the patient currently need. .. Total A Lot A Little None   1. Putting on and taking off regular lower body clothing?   [ ] 1   [X] 2   [ ] 3   [ ] 4   2. Bathing (including washing, rinsing, drying)? [ ] 1   [X] 2   [ ] 3   [ ] 4   3. Toileting, which includes using toilet, bedpan or urinal?   [ ] 1   [X] 2   [ ] 3   [ ] 4   4. Putting on and taking off regular upper body clothing?   [ ] 1   [ ] 2   [X] 3   [ ] 4   5. Taking care of personal grooming such as brushing teeth? [ ] 1   [ ] 2   [X] 3   [ ] 4   6. Eating meals? [ ] 1   [ ] 2   [X] 3   [ ] 4   © , Trustees of [address] [place] [address], under license to [employer]. All rights reserved   Score:  Initial: 15 Most Recent: X (Date: -- )     Interpretation of Tool:  Represents activities that are increasingly more difficult (i.e. Bed mobility, Transfers, Gait).        Score 24 23 22-20 19-15 14-10 9-7 6       Modifier CH CI CJ CK CL CM CN         · Self Care:               - CURRENT STATUS:    CK - 40%-59% impaired, limited or restricted               - GOAL STATUS:           CJ - 20%-39% impaired, limited or restricted               - D/C STATUS:                       ---------------To be determined---------------  Payor: BLUE CROSS MEDICARE / [place] / Product Type: Managed Care Medicare /       Medical Necessity:     · Patient is expected to demonstrate progress in strength, balance, coordination and functional technique to increase independence with self care and functional mobility. Reason for Services/Other Comments:  · Patient continues to require skilled intervention due to decrease self care and mobility. Use of outcome tool(s) and clinical judgement create a POC that gives a: LOW COMPLEXITY                 TREATMENT:   (In addition to Assessment/Re-Assessment sessions the following treatments were rendered)      Pre-treatment Symptoms/Complaints:  nausea  Pain: Initial:   Pain Intensity 1: 0  Post Session:  0      Self Care: (40min): Procedure(s) (per grid) utilized to improve and/or restore self-care/home management as related to dressing, bathing, toileting and grooming. Required minimal verbal and   cueing to facilitate activities of daily living skills. Treatment/Session Assessment:         Response to Treatment:  Tolerated well. Education:  [ ] Home Exercises  [X] Fall Precautions  [ ] Hip Precautions [ ] Going Home Video  [X] Knee/Hip Prosthesis Review  [X] Walker Management/Safety [X] Adaptive Equipment as Needed         Interdisciplinary Collaboration:   · Physical Therapist  · Occupational Therapist  · Registered Nurse     After treatment position/precautions:   · Supine in bed, Bed/Chair-wheels locked, Bed in low position, Call light within reach, RN notified and Side rails x 3     Compliance with Program/Exercises: Will assess as treatment progresses.      Recommendations/Intent for next treatment session:  Pt tolerated well and will do well at home with family        Total Treatment Duration:  OT Patient Time In/Time Out  Time In: 0935  Time Out: 88 Jose Chauhan OT

## 2017-06-08 NOTE — PROGRESS NOTES
Problem: Mobility Impaired (Adult and Pediatric)  Goal: *Acute Goals and Plan of Care (Insert Text)  GOALS (1-4 days):  (1.)Ms. Domingo will move from supine to sit and sit to supine in bed with INDEPENDENT. (2.)Ms. Domingo will transfer from bed to chair and chair to bed with INDEPENDENT using the least restrictive device. (3.)Ms. Domingo will ambulate with INDEPENDENT for 250 feet with the least restrictive device. (4.)Ms. Domingo will ambulate up/down 3 steps with bilateral railing with MINIMAL ASSIST with no device. (5.)Ms. Domingo will increase left knee ROM to 5°-80°.  ________________________________________________________________________________________________  Outcome: Progressing Towards Goal      PHYSICAL THERAPY JOINT CAMP TKA: Daily Note and PM 6/8/2017  INPATIENT: Hospital Day: 2  Payor: BLUE CROSS MEDICARE / [place] / Product Type: Managed Care Medicare /      NAME/AGE/GENDER: Margie Vernon is a 74 y.o. female     PRIMARY DIAGNOSIS:  Primary localized osteoarthritis of left knee [M17.12]              Procedure(s) and Anesthesia Type:     * LEFT KNEE ARTHROPLASTY TOTAL  - Spinal (Left)  ICD-10: Treatment Diagnosis:        · Pain in Left Knee (M25.562)  · Stiffness of Left Knee, Not elsewhere classified (M25.662)  · Difficulty in walking, Not elsewhere classified (R26.2)  · Other abnormalities of gait and mobility (R26.89)       ASSESSMENT:      Ms. Claudette Owusu presents status post left total knee replacement with decreased independence with bed mobility, transfers, gait, and therapeutic exercise. Therapy will maximize independence with functional mobility. She is very pleasant. She ambulates to the gym using RW with CGA and performs exercises without increase in pain. This section established at most recent assessment   PROBLEM LIST (Impairments causing functional limitations):  1. Decreased Strength  2. Decreased Transfer Abilities  3.  Decreased Ambulation Ability/Technique  4. Decreased Balance  5. Increased Pain  6. Decreased Activity Tolerance  7. Decreased Flexibility/Joint Mobility    INTERVENTIONS PLANNED: (Benefits and precautions of physical therapy have been discussed with the patient.)  1. Bed Mobility  2. Gait Training  3. Home Exercise Program (HEP)  4. Therapeutic Exercise/Strengthening  5. Transfer Training  6. Range of Motion: active/assisted/passive  7. Therapeutic Activities  8. Group Therapy      TREATMENT PLAN: Frequency/Duration: Follow patient BID   to address above goals. Rehabilitation Potential For Stated Goals: GOOD      RECOMMENDED REHABILITATION/EQUIPMENT: (at time of discharge pending progress): Home Health: Physical Therapy. HISTORY:   History of Present Injury/Illness (Reason for Referral):  Pt with decreased independence with functional mobility. Past Medical History/Comorbidities:   Ms. Saurabh Alexander  has a past medical history of Asthma; Diabetes ([address] (2012); Hypertension; Morbid obesity ([facility] [address] Thromboembolus ([facility] [address] Thyroid disease; and Unspecified adverse effect of anesthesia. She also has no past medical history of Adverse effect of anesthesia; Difficult intubation; Endocarditis; Malignant hyperthermia due to anesthesia; Nausea & vomiting; Nicotine vapor product user; Non-nicotine vapor product user; Pseudocholinesterase deficiency; or Rheumatic fever. Ms. Saurabh Alexander  has a past surgical history that includes gyn; neurological procedure unlisted; carpal tunnel release (1984); orthopaedic; appendectomy (1966); heent; and refractive surgery (2001).   Social History/Living Environment:   Home Environment: Private residence  # Steps to Enter: 1  One/Two Story Residence: One story  Living Alone: Yes  Support Systems: Family member(s)  Patient Expects to be Discharged to[de-identified] Private residence  Current DME Used/Available at Home: None  Prior Level of Function/Work/Activity:  Independent with ambulation and ADLs   Number of Personal Factors/Comorbidities that affect the Plan of Care: 0: LOW COMPLEXITY   EXAMINATION:   Most Recent Physical Functioning:                  LLE AROM  L Knee Flexion: 86  L Knee Extension: 15            Bed Mobility  Supine to Sit: Contact guard assistance  Sit to Supine:  (left up in chair)     Transfers  Sit to Stand: Contact guard assistance  Stand to Sit: Contact guard assistance     Balance  Sitting: Intact  Standing: Intact; With support                Weight Bearing Status  Left Side Weight Bearing: As tolerated  Distance (ft): 240 Feet (ft)  Ambulation - Level of Assistance: Contact guard assistance  Assistive Device: Walker, rolling  Gait Abnormalities: Antalgic         Braces/Orthotics: none     Left Knee Cold  Type: Cryocuff       Body Structures Involved:  1. Bones  2. Joints  3. Muscles Body Functions Affected:  1. Neuromusculoskeletal  2. Movement Related Activities and Participation Affected:  1. Mobility   Number of elements that affect the Plan of Care: 4+: HIGH COMPLEXITY   CLINICAL PRESENTATION:   Presentation: Stable and uncomplicated: LOW COMPLEXITY   CLINICAL DECISION MAKIN [place] [id number] AM-PAC 6 Clicks   Basic Mobility Inpatient Short Form  How much difficulty does the patient currently have. .. Unable A Lot A Little None   1. Turning over in bed (including adjusting bedclothes, sheets and blankets)? [ ] 1   [ ] 2   [X] 3   [ ] 4   2. Sitting down on and standing up from a chair with arms ( e.g., wheelchair, bedside commode, etc.)   [ ] 1   [ ] 2   [X] 3   [ ] 4   3. Moving from lying on back to sitting on the side of the bed? [ ] 1   [ ] 2   [X] 3   [ ] 4   How much help from another person does the patient currently need. .. Total A Lot A Little None   4. Moving to and from a bed to a chair (including a wheelchair)? [ ] 1   [ ] 2   [X] 3   [ ] 4   5. Need to walk in hospital room? [ ] 1   [ ] 2   [X] 3   [ ] 4   6. Climbing 3-5 steps with a railing?    [ ] 1   [ ] 2   [X] 3   [ ] 4   © 2007, Trustees of [address] [place] [address] [address] [address], under license to [employer]. All rights reserved       Score:  Initial: 18 Most Recent: X (Date: -- )     Interpretation of Tool:  Represents activities that are increasingly more difficult (i.e. Bed mobility, Transfers, Gait). Score 24 23 22-20 19-15 14-10 9-7 6       Modifier CH CI CJ CK CL CM CN         · Mobility - Walking and Moving Around:               - CURRENT STATUS:    CK - 40%-59% impaired, limited or restricted               - GOAL STATUS:           CK - 40%-59% impaired, limited or restricted               - D/C STATUS:                       CK - 40%-59% impaired, limited or restricted  Payor: BLUE CROSS MEDICARE / [place] / Product Type: Managed Care Medicare /       Medical Necessity:     · Skilled intervention continues to be required due to decreased mobility ability. Reason for Services/Other Comments:  · Patient continues to require skilled intervention due to decreased mobility ability. Use of outcome tool(s) and clinical judgement create a POC that gives a: Clear prediction of patient's progress: LOW COMPLEXITY                 TREATMENT:   (In addition to Assessment/Re-Assessment sessions the following treatments were rendered)      Pre-treatment Symptoms/Complaints:  Pt complains of nausea. Pain: Initial:   Pain Intensity 1: 0 (0/10 after therapy)  Post Session:       Gait Training (15 Minutes):  Gait training to improve and/or restore physical functioning as related to mobility. Ambulated 240 Feet (ft) with Contact guard assistance using a Walker, rolling and minimal   related to their knee position and motion to promote proper body alignment. Therapeutic Exercise: (45 Minutes (group)):  Exercises per grid below to improve strength. Required minimal verbal cues to promote proper body alignment. Progressed range as indicated.            Date:  6/8    Date:    Date: ACTIVITY/EXERCISE AM PM AM PM AM PM   GROUP THERAPY  [ ]  [x ]  [ ]  [ ]  [ ]  [ ]   Ankle Pumps 15   15           Quad Sets  15  15           Gluteal Sets  15  15           Hip ABd/ADduction  15  15           Straight Leg Raises  15  15           Knee Slides  15  15           Short Arc Quads  15  15           Long Arc Quads               Chair Slides    15                           B = bilateral; AA = active assistive; A = active; P = passive       Treatment/Session Assessment:         Response to Treatment:  She tolerated exercises and gait well. Education:  [ ] Home Exercises  [X] Fall Precautions  [X] Hip Precautions [ ] Going Home Video  [ ] Knee/Hip Prosthesis Review  [ ] [facility] Management/Safety [ ] Adaptive Equipment as Needed         Interdisciplinary Collaboration:   · Registered Nurse     After treatment position/precautions:   · Supine in bed, Bed/Chair-wheels locked, Bed in low position, Caregiver at bedside, Call light within reach and RN notified     Compliance with Program/Exercises: compliant most of the time. Recommendations/Intent for next treatment session:  Treatment next visit will focus on increasing Ms. Domingo's independence with bed mobility, transfers, gait training, strength/ROM exercises, modalities for pain, and patient education.        Total Treatment Duration:  PT Patient Time In/Time Out  Time In: 1300  Time Out: 9479 [facility]

## 2017-06-08 NOTE — PROGRESS NOTES
Problem: Mobility Impaired (Adult and Pediatric)  Goal: *Acute Goals and Plan of Care (Insert Text)  GOALS (1-4 days):  (1.)Ms. Domingo will move from supine to sit and sit to supine in bed with INDEPENDENT. (2.)Ms. Domingo will transfer from bed to chair and chair to bed with INDEPENDENT using the least restrictive device. (3.)Ms. Domingo will ambulate with INDEPENDENT for 250 feet with the least restrictive device. (4.)Ms. Domingo will ambulate up/down 3 steps with bilateral railing with MINIMAL ASSIST with no device. (5.)Ms. Domingo will increase left knee ROM to 5°-80°.  ________________________________________________________________________________________________  Outcome: Progressing Towards Goal      PHYSICAL THERAPY JOINT CAMP TKA: Daily Note and AM 6/8/2017  INPATIENT: Hospital Day: 2  Payor: BLUE CROSS MEDICARE / [place] / Product Type: Managed Care Medicare /      NAME/AGE/GENDER: Anthony Rodriguez is a 74 y.o. female     PRIMARY DIAGNOSIS:  Primary localized osteoarthritis of left knee [M17.12]              Procedure(s) and Anesthesia Type:     * LEFT KNEE ARTHROPLASTY TOTAL  - Spinal (Left)  ICD-10: Treatment Diagnosis:        · Pain in Left Knee (M25.562)  · Stiffness of Left Knee, Not elsewhere classified (M25.662)  · Difficulty in walking, Not elsewhere classified (R26.2)  · Other abnormalities of gait and mobility (R26.89)       ASSESSMENT:      Ms. Jackeline Blanton presents status post left total knee replacement with decreased independence with bed mobility, transfers, gait, and therapeutic exercise. Therapy will maximize independence with functional mobility. She is very pleasant this morning. She performs exercises in the chair without increase in pain. She increase her distance using RW with CGA. She will sit up and then come to group. This section established at most recent assessment   PROBLEM LIST (Impairments causing functional limitations):  1.  Decreased Strength  2. Decreased Transfer Abilities  3. Decreased Ambulation Ability/Technique  4. Decreased Balance  5. Increased Pain  6. Decreased Activity Tolerance  7. Decreased Flexibility/Joint Mobility    INTERVENTIONS PLANNED: (Benefits and precautions of physical therapy have been discussed with the patient.)  1. Bed Mobility  2. Gait Training  3. Home Exercise Program (HEP)  4. Therapeutic Exercise/Strengthening  5. Transfer Training  6. Range of Motion: active/assisted/passive  7. Therapeutic Activities  8. Group Therapy      TREATMENT PLAN: Frequency/Duration: Follow patient BID   to address above goals. Rehabilitation Potential For Stated Goals: GOOD      RECOMMENDED REHABILITATION/EQUIPMENT: (at time of discharge pending progress): Home Health: Physical Therapy. HISTORY:   History of Present Injury/Illness (Reason for Referral):  Pt with decreased independence with functional mobility. Past Medical History/Comorbidities:   Ms. Tierney Strauss  has a past medical history of Asthma; Diabetes ([facility] [address] (2012); Hypertension; Morbid obesity ([facility] [address] Thromboembolus ([facility] [address] Thyroid disease; and Unspecified adverse effect of anesthesia. She also has no past medical history of Adverse effect of anesthesia; Difficult intubation; Endocarditis; Malignant hyperthermia due to anesthesia; Nausea & vomiting; Nicotine vapor product user; Non-nicotine vapor product user; Pseudocholinesterase deficiency; or Rheumatic fever. Ms. Tierney Strauss  has a past surgical history that includes gyn; neurological procedure unlisted; carpal tunnel release (1984); orthopaedic; appendectomy (1966); heent; and refractive surgery (2001).   Social History/Living Environment:   Home Environment: Private residence  # Steps to Enter: 1  One/Two Story Residence: One story  Living Alone: Yes  Support Systems: Family member(s)  Patient Expects to be Discharged to[de-identified] Private residence  Current DME Used/Available at Home: None  Prior Level of Function/Work/Activity:  Independent with ambulation and ADLs   Number of Personal Factors/Comorbidities that affect the Plan of Care: 0: LOW COMPLEXITY   EXAMINATION:   Most Recent Physical Functioning:                               Bed Mobility  Supine to Sit: Contact guard assistance  Sit to Supine:  (left up in chair)     Transfers  Sit to Stand: Contact guard assistance  Stand to Sit: Contact guard assistance                      Weight Bearing Status  Left Side Weight Bearing: As tolerated  Distance (ft): 30 Feet (ft)  Ambulation - Level of Assistance: Contact guard assistance  Assistive Device: Walker, rolling  Gait Abnormalities: Antalgic         Braces/Orthotics: none     Left Knee Cold  Type: Cryocuff       Body Structures Involved:  1. Bones  2. Joints  3. Muscles Body Functions Affected:  1. Neuromusculoskeletal  2. Movement Related Activities and Participation Affected:  1. Mobility   Number of elements that affect the Plan of Care: 4+: HIGH COMPLEXITY   CLINICAL PRESENTATION:   Presentation: Stable and uncomplicated: LOW COMPLEXITY   CLINICAL DECISION MAKIN [facility] [id number] AM-PAC 6 Clicks   Basic Mobility Inpatient Short Form  How much difficulty does the patient currently have. .. Unable A Lot A Little None   1. Turning over in bed (including adjusting bedclothes, sheets and blankets)? [ ] 1   [ ] 2   [X] 3   [ ] 4   2. Sitting down on and standing up from a chair with arms ( e.g., wheelchair, bedside commode, etc.)   [ ] 1   [ ] 2   [X] 3   [ ] 4   3. Moving from lying on back to sitting on the side of the bed? [ ] 1   [ ] 2   [X] 3   [ ] 4   How much help from another person does the patient currently need. .. Total A Lot A Little None   4. Moving to and from a bed to a chair (including a wheelchair)? [ ] 1   [ ] 2   [X] 3   [ ] 4   5. Need to walk in hospital room? [ ] 1   [ ] 2   [X] 3   [ ] 4   6. Climbing 3-5 steps with a railing?    [ ] 1   [ ] 2   [X] 3   [ ] 4   © 2007, Trustees of [address] [place] [address] [address] [address], under license to [employer]. All rights reserved       Score:  Initial: 18 Most Recent: X (Date: -- )     Interpretation of Tool:  Represents activities that are increasingly more difficult (i.e. Bed mobility, Transfers, Gait). Score 24 23 22-20 19-15 14-10 9-7 6       Modifier CH CI CJ CK CL CM CN         · Mobility - Walking and Moving Around:               - CURRENT STATUS:    CK - 40%-59% impaired, limited or restricted               - GOAL STATUS:           CK - 40%-59% impaired, limited or restricted               - D/C STATUS:                       CK - 40%-59% impaired, limited or restricted  Payor: BLUE CROSS MEDICARE / [place] / Product Type: [employer] Medicare /       Medical Necessity:     · Skilled intervention continues to be required due to decreased mobility ability. Reason for Services/Other Comments:  · Patient continues to require skilled intervention due to decreased mobility ability. Use of outcome tool(s) and clinical judgement create a POC that gives a: Clear prediction of patient's progress: LOW COMPLEXITY                 TREATMENT:   (In addition to Assessment/Re-Assessment sessions the following treatments were rendered)      Pre-treatment Symptoms/Complaints:  Pt complains of nausea. Pain: Initial:   Pain Intensity 1: 0 (0/10 after therapy)  Post Session:  No complaints      Gait Training (15 Minutes):  Gait training to improve and/or restore physical functioning as related to mobility. Ambulated 30 Feet (ft) with Contact guard assistance using a Walker, rolling and minimal   related to their knee position and motion to promote proper body alignment. Therapeutic Exercise: (15 Minutes):  Exercises per grid below to improve strength. Required minimal verbal cues to promote proper body alignment. Progressed range as indicated.            Date:  6/8    Date:    Date:      ACTIVITY/EXERCISE AM PM AM PM AM PM   GROUP THERAPY  [ ]  [ ]  [ ]  [ ]  [ ]  [ ]   Ankle Pumps 15              Quad Sets  15             Gluteal Sets  15             Hip ABd/ADduction  15             Straight Leg Raises  15             Knee Slides  15             Short Arc Quads  15             Long Arc Quads               Chair Slides                               B = bilateral; AA = active assistive; A = active; P = passive       Treatment/Session Assessment:         Response to Treatment:  She tolerated exercises and gait well. Education:  [ ] Home Exercises  [X] Fall Precautions  [X] Hip Precautions [ ] Going Home Video  [ ] Knee/Hip Prosthesis Review  [ ] [facility] Management/Safety [ ] Adaptive Equipment as Needed         Interdisciplinary Collaboration:   · Registered Nurse     After treatment position/precautions:   · Up in chair, Bed/Chair-wheels locked, Bed in low position, Caregiver at bedside, Call light within reach and RN notified     Compliance with Program/Exercises: compliant most of the time. Recommendations/Intent for next treatment session:  Treatment next visit will focus on increasing Ms. Domingo's independence with bed mobility, transfers, gait training, strength/ROM exercises, modalities for pain, and patient education.        Total Treatment Duration:  PT Patient Time In/Time Out  Time In: 0800  Time Out: 0830     Maryse Stratton, PTA

## 2017-06-08 NOTE — PROGRESS NOTES
06/07/17 2000   Oxygen Therapy   O2 Sat (%) 95 %   Pulse via Oximetry 70 beats per minute   O2 Device Room air   O2 Flow Rate (L/min) 0 l/min   C/S # 8 placed on patient; data cleared; called monitor room; spoke with Herve Lagunas

## 2017-06-08 NOTE — PROGRESS NOTES
Care Management Interventions  Transition of Care Consult (CM Consult): [address]: Yes  Discharge Durable Medical Equipment: No  Physical Therapy Consult: Yes  Occupational Therapy Consult: Yes  Current Support Network: Lives with Spouse  Confirm Follow Up Transport: Family  Plan discussed with Pt/Family/Caregiver: Yes  Freedom of Choice Offered: Yes  Discharge Location  Discharge Placement: Home with home health    Patient is a 74y.o. year old female admitted for Left TKA . Patient plans to return home on discharge. She will go to her house in [place] ([address] over the weekend but plans to stay at her son's house [email or web address] starting Mon 6-12 . Order received to arrange home health. Patient without preference towards agency. Referral sent to [facility] with plans to start [facility] on Monday at son's house. Patient requesting we arrange a walker and bedside commode. Referral sent to [facility] who will deliver to the hospital room prior to discharge. Will follow until discharge.   Terra [place]

## 2017-06-08 NOTE — PROGRESS NOTES
Patient is A&Ox4. Able to verbalize needs. Resting quietly with no distress noted. Dressing to surgical site is dry and intact. Neurovascular and peripheral vascular checks WNL. Teixeira draining clear yellow urine to bag. Denies needs. Bed low and locked. Call light within reach. Instructed to call for assistance. Patient verbalizes understanding. Will monitor.

## 2017-06-08 NOTE — PROGRESS NOTES
2017         Post Op day: 1 Day Post-Op     Admit Date: 2017  Admit Diagnosis: Primary localized osteoarthritis of left knee [M17.12]        Subjective: Patient doing well. No concerns/complaints. No shortness of breath, chest pain or nausea/vomiting. Pain well controlled     Objective:   Visit Vitals    /89    Pulse 72    Temp 95.4 °F (35.2 °C)    Resp 18    Ht 5' 5\" (1.651 m)    Wt 98.1 kg (216 lb 3.2 oz)    SpO2 98%    Breastfeeding No    BMI 35.98 kg/m2    Temp (24hrs), Av.1 °F (36.2 °C), Min:95.4 °F (35.2 °C), Max:99.6 °F (37.6 °C)    Lab Results   Component Value Date/Time    HGB 10.7 2017 04:50 AM     Extremity Exam  Dressing Clean, dry   +Tibialis Anterior and Gastroc-Soleus left lower extremity  Sensation intact to light touch  Extremity perfused  TEDS/SCDS  No sign of DVT     Assessment / Plan :  WBAT LLE  PT/OT  ASA along with SCDs/TEDS for DVT prophylaxis in house.  Home on ASA 81mg BID  DIspo-Home with HH  Patient Active Problem List   Diagnosis Code    OA (osteoarthritis) of knee M17.10          Signed By: Joellen Husain MD

## 2017-06-08 NOTE — PROGRESS NOTES
[address]  Face to Face Encounter    Patients Name: Kathy Marroquin    YOB: 1950    Ordering Physician: Brandie Figueroa    Primary Diagnosis: Primary localized osteoarthritis of left knee [M17.12]  S/p left TKA    Date of Face to Face:   6-7-17                                 Face to Face Encounter findings are related to primary reason for home care:   yes. 1. I certify that the patient needs intermittent care as follows: physical therapy: gait/stair training    2. I certify that this patient is homebound, that is: 1) patient requires the use of a walker device, special transportation, or assistance of another to leave the home; or 2) patient's condition makes leaving the home medically contraindicated; and 3) patient has a normal inability to leave the home and leaving the home requires considerable and taxing effort. Patient may leave the home for infrequent and short duration for medical reasons, and occasional absences for non-medical reasons. Homebound status is due to the following functional limitations: Patient's ambulation limited secondary to severe pain and requires the use of an assistive device and the assistance of a caregiver for safe completion. Patient with strength and ROM deficits limiting ambulation endurance requiring the use of an assistive device and the assistance of a caregiver. Patient deemed temporarily homebound secondary to increased risk for infection when leaving home and going out into the community. 3. I certify that this patient is under my care and that I, or a nurse practitioner or  [id number], or clinical nurse specialist, or certified nurse midwife, working with me, had a Face-to-Face Encounter that meets the physician Face-to-Face Encounter requirements.   The following are the clinical findings from the [address] [place] [address] encounter that support the need for skilled services and is a summary of the encounter: see hospital chart        [facility] Andrew Bynum  6/8/2017      THE FOLLOWING TO BE COMPLETED BY THE COMMUNITY PHYSICIAN:    I concur with the findings described above from the F2F encounter that this patient is homebound and in need of a skilled service.     Certifying Physician: _____________________________________      Printed Certifying Physician Name: _____________________________________    Date: _________________

## 2017-06-09 VITALS
HEIGHT: 65 IN | TEMPERATURE: 99 F | WEIGHT: 216.2 LBS | DIASTOLIC BLOOD PRESSURE: 71 MMHG | OXYGEN SATURATION: 90 % | HEART RATE: 95 BPM | SYSTOLIC BLOOD PRESSURE: 141 MMHG | BODY MASS INDEX: 36.02 KG/M2 | RESPIRATION RATE: 20 BRPM

## 2017-06-09 LAB
GLUCOSE BLD STRIP.AUTO-MCNC: 153 MG/DL (ref 65–100)
HGB BLD-MCNC: 10.5 G/DL (ref 11.7–15.4)

## 2017-06-09 PROCEDURE — 74011250637 HC RX REV CODE- 250/637: Performed by: ORTHOPAEDIC SURGERY

## 2017-06-09 PROCEDURE — 82962 GLUCOSE BLOOD TEST: CPT

## 2017-06-09 PROCEDURE — 97116 GAIT TRAINING THERAPY: CPT

## 2017-06-09 PROCEDURE — 97150 GROUP THERAPEUTIC PROCEDURES: CPT

## 2017-06-09 PROCEDURE — 94762 N-INVAS EAR/PLS OXIMTRY CONT: CPT

## 2017-06-09 PROCEDURE — 36415 COLL VENOUS BLD VENIPUNCTURE: CPT | Performed by: ORTHOPAEDIC SURGERY

## 2017-06-09 PROCEDURE — 85018 HEMOGLOBIN: CPT | Performed by: ORTHOPAEDIC SURGERY

## 2017-06-09 PROCEDURE — 77010033678 HC OXYGEN DAILY

## 2017-06-09 RX ADMIN — OXYCODONE HYDROCHLORIDE 10 MG: 5 TABLET ORAL at 02:48

## 2017-06-09 RX ADMIN — SENNOSIDES AND DOCUSATE SODIUM 2 TABLET: 8.6; 5 TABLET ORAL at 08:34

## 2017-06-09 RX ADMIN — OXYCODONE HYDROCHLORIDE 10 MG: 5 TABLET ORAL at 00:02

## 2017-06-09 RX ADMIN — BENAZEPRIL HYDROCHLORIDE: 10 TABLET ORAL at 08:34

## 2017-06-09 RX ADMIN — OXYCODONE HYDROCHLORIDE 10 MG: 10 TABLET, FILM COATED, EXTENDED RELEASE ORAL at 08:34

## 2017-06-09 RX ADMIN — ACETAMINOPHEN 1000 MG: 500 TABLET, FILM COATED ORAL at 12:31

## 2017-06-09 RX ADMIN — LEVOTHYROXINE SODIUM 75 MCG: 75 TABLET ORAL at 06:33

## 2017-06-09 RX ADMIN — OXYCODONE HYDROCHLORIDE 10 MG: 5 TABLET ORAL at 12:31

## 2017-06-09 RX ADMIN — OXYCODONE HYDROCHLORIDE 10 MG: 5 TABLET ORAL at 15:05

## 2017-06-09 RX ADMIN — ASPIRIN 81 MG: 81 TABLET, COATED ORAL at 08:34

## 2017-06-09 RX ADMIN — OXYCODONE HYDROCHLORIDE 10 MG: 5 TABLET ORAL at 09:29

## 2017-06-09 RX ADMIN — CYCLOBENZAPRINE HYDROCHLORIDE 5 MG: 10 TABLET, FILM COATED ORAL at 15:05

## 2017-06-09 RX ADMIN — OXYCODONE HYDROCHLORIDE 10 MG: 5 TABLET ORAL at 06:37

## 2017-06-09 RX ADMIN — GABAPENTIN 100 MG: 100 CAPSULE ORAL at 08:34

## 2017-06-09 RX ADMIN — ACETAMINOPHEN 1000 MG: 500 TABLET, FILM COATED ORAL at 06:33

## 2017-06-09 RX ADMIN — OXYCODONE HYDROCHLORIDE 10 MG: 5 TABLET ORAL at 17:40

## 2017-06-09 RX ADMIN — HYDROCHLOROTHIAZIDE 25 MG: 25 TABLET ORAL at 08:34

## 2017-06-09 NOTE — PROGRESS NOTES
2017         Post Op day: 2 Days Post-Op     Admit Date: 2017  Admit Diagnosis: Primary localized osteoarthritis of left knee [M17.12]        Subjective: Patient doing well. No concerns/complaints. No shortness of breath, chest pain or nausea/vomiting. Did well with PT yesterday. Pain well controlled     Objective:   Visit Vitals    /69    Pulse 80    Temp 98 °F (36.7 °C)    Resp 16    Ht 5' 5\" (1.651 m)    Wt 98.1 kg (216 lb 3.2 oz)    SpO2 96%    Breastfeeding No    BMI 35.98 kg/m2    Temp (24hrs), Av.7 °F (37.1 °C), Min:97.4 °F (36.3 °C), Max:100.5 °F (38.1 °C)    Lab Results   Component Value Date/Time    HGB 10.5 2017 04:37 AM     Extremity Exam  Dressing Clean, dry   +Tibialis Anterior and Gastroc-Soleus left lower extremity  Sensation intact to light touch  Extremity perfused  TEDS/SCDS  No sign of DVT     Assessment / Plan :  WBAT LLE  PT/OT  ASA along with SCDs/TEDS for DVT prophylaxis in house. Home on ASA 81mg BID  DIspo-Home with HH  Patient Active Problem List   Diagnosis Code    OA (osteoarthritis) of knee M17.10          Signed By: Ly Moreno MD     I have reviewed the patients controlled substance prescription history, as maintained in the [place] prescription monitoring program, so that the prescription(s) for a  controlled substance can be given.

## 2017-06-09 NOTE — PROGRESS NOTES
06/08/17 2111   Oxygen Therapy   O2 Sat (%) (!) 82 %   Pulse via Oximetry 103 beats per minute   O2 Device Room air  (Placed on 2L nasal cannula )   O2 Flow Rate (L/min) 0 l/min   Placed on 2L nasal cannula and C/S# 8. Monitor room notified.

## 2017-06-09 NOTE — PROGRESS NOTES
Problem: Mobility Impaired (Adult and Pediatric)  Goal: *Acute Goals and Plan of Care (Insert Text)  GOALS (1-4 days):  (1.)Ms. Domingo will move from supine to sit and sit to supine in bed with INDEPENDENT. (2.)Ms. Domingo will transfer from bed to chair and chair to bed with INDEPENDENT using the least restrictive device. (3.)Ms. Domingo will ambulate with INDEPENDENT for 250 feet with the least restrictive device. (4.)Ms. Domingo will ambulate up/down 3 steps with bilateral railing with MINIMAL ASSIST with no device. Knows how  (5.)Ms. Domingo will increase left knee ROM to 5°-80°.  ________________________________________________________________________________________________  Outcome: Progressing Towards Goal      PHYSICAL THERAPY JOINT CAMP TKA: Daily Note and AM 6/9/2017  INPATIENT: Hospital Day: 3  Payor: BLUE CROSS MEDICARE / Plan[place]PO / Product Type: Managed Care Medicare /      NAME/AGE/GENDER: Rahat Avilez is a 74 y.o. female     PRIMARY DIAGNOSIS:  Primary localized osteoarthritis of left knee [M17.12]              Procedure(s) and Anesthesia Type:     * LEFT KNEE ARTHROPLASTY TOTAL  - Spinal (Left)  ICD-10: Treatment Diagnosis:        · Pain in Left Knee (M25.562)  · Stiffness of Left Knee, Not elsewhere classified (M25.662)  · Difficulty in walking, Not elsewhere classified (R26.2)  · Other abnormalities of gait and mobility (R26.89)       ASSESSMENT:      Ms. Maria Luz Lynn presents status post left total knee replacement with decreased independence with bed mobility, transfers, gait, and therapeutic exercise. Therapy will maximize independence with functional mobility. She is very pleasant. She performs TK exercises without increase in pain. She is ready for D/C. This section established at most recent assessment   PROBLEM LIST (Impairments causing functional limitations):  1. Decreased Strength  2. Decreased Transfer Abilities  3.  Decreased Ambulation Ability/Technique  4. Decreased Balance  5. Increased Pain  6. Decreased Activity Tolerance  7. Decreased Flexibility/Joint Mobility    INTERVENTIONS PLANNED: (Benefits and precautions of physical therapy have been discussed with the patient.)  1. Bed Mobility  2. Gait Training  3. Home Exercise Program (HEP)  4. Therapeutic Exercise/Strengthening  5. Transfer Training  6. Range of Motion: active/assisted/passive  7. Therapeutic Activities  8. Group Therapy      TREATMENT PLAN: Frequency/Duration: Follow patient BID   to address above goals. Rehabilitation Potential For Stated Goals: GOOD      RECOMMENDED REHABILITATION/EQUIPMENT: (at time of discharge pending progress): Home Health: Physical Therapy. HISTORY:   History of Present Injury/Illness (Reason for Referral):  Pt with decreased independence with functional mobility. Past Medical History/Comorbidities:   Ms. Michael Ortiz  has a past medical history of Asthma; Diabetes ([address] (2012); Hypertension; Morbid obesity ([address] Thromboembolus ([facility] [address] [facility] Thyroid disease; and Unspecified adverse effect of anesthesia. She also has no past medical history of Adverse effect of anesthesia; Difficult intubation; Endocarditis; Malignant hyperthermia due to anesthesia; Nausea & vomiting; Nicotine vapor product user; Non-nicotine vapor product user; Pseudocholinesterase deficiency; or Rheumatic fever. Ms. Michael Ortiz  has a past surgical history that includes gyn; neurological procedure unlisted; carpal tunnel release (1984); orthopaedic; appendectomy (1966); heent; and refractive surgery (2001).   Social History/Living Environment:   Home Environment: Private residence  # Steps to Enter: 1  One/Two Story Residence: One story  Living Alone: Yes  Support Systems: Family member(s)  Patient Expects to be Discharged to[de-identified] Private residence  Current DME Used/Available at Home: None  Prior Level of Function/Work/Activity:  Independent with ambulation and ADLs   Number of Personal Factors/Comorbidities that affect the Plan of Care: 0: LOW COMPLEXITY   EXAMINATION:   Most Recent Physical Functioning:                  LLE AROM  L Knee Flexion: 76  L Knee Extension: 10                  Transfers  Sit to Stand: Stand-by asssistance  Stand to Sit: Stand-by asssistance                      Weight Bearing Status  Left Side Weight Bearing: As tolerated  Distance (ft): 240 Feet (ft)  Ambulation - Level of Assistance: Stand-by asssistance  Assistive Device: Walker, rolling  Gait Abnormalities: Antalgic         Braces/Orthotics: none     Left Knee Cold  Type: Cryocuff       Body Structures Involved:  1. Bones  2. Joints  3. Muscles Body Functions Affected:  1. Neuromusculoskeletal  2. Movement Related Activities and Participation Affected:  1. Mobility   Number of elements that affect the Plan of Care: 4+: HIGH COMPLEXITY   CLINICAL PRESENTATION:   Presentation: Stable and uncomplicated: LOW COMPLEXITY   CLINICAL DECISION MAKIN[address] [place] [address] AM-PAC 6 Clicks   Basic Mobility Inpatient Short Form  How much difficulty does the patient currently have. .. Unable A Lot A Little None   1. Turning over in bed (including adjusting bedclothes, sheets and blankets)? [ ] 1   [ ] 2   [X] 3   [ ] 4   2. Sitting down on and standing up from a chair with arms ( e.g., wheelchair, bedside commode, etc.)   [ ] 1   [ ] 2   [X] 3   [ ] 4   3. Moving from lying on back to sitting on the side of the bed? [ ] 1   [ ] 2   [X] 3   [ ] 4   How much help from another person does the patient currently need. .. Total A Lot A Little None   4. Moving to and from a bed to a chair (including a wheelchair)? [ ] 1   [ ] 2   [X] 3   [ ] 4   5. Need to walk in hospital room? [ ] 1   [ ] 2   [X] 3   [ ] 4   6. Climbing 3-5 steps with a railing? [ ] 1   [ ] 2   [X] 3   [ ] 4   © , Trustees of [address] [place], [place] [id number], under license to [employer].  All rights reserved       Score:  Initial: 18 Most Recent: X (Date: -- )     Interpretation of Tool:  Represents activities that are increasingly more difficult (i.e. Bed mobility, Transfers, Gait). Score 24 23 22-20 19-15 14-10 9-7 6       Modifier CH CI CJ CK CL CM CN         · Mobility - Walking and Moving Around:               - CURRENT STATUS:    CK - 40%-59% impaired, limited or restricted               - GOAL STATUS:           CK - 40%-59% impaired, limited or restricted               - D/C STATUS:                       CK - 40%-59% impaired, limited or restricted  Payor: BLUE CROSS MEDICARE / [place] / Product Type: Managed Care Medicare /       Medical Necessity:     · Skilled intervention continues to be required due to decreased mobility ability. Reason for Services/Other Comments:  · Patient continues to require skilled intervention due to decreased mobility ability. Use of outcome tool(s) and clinical judgement create a POC that gives a: Clear prediction of patient's progress: LOW COMPLEXITY                 TREATMENT:   (In addition to Assessment/Re-Assessment sessions the following treatments were rendered)      Pre-treatment Symptoms/Complaints:  Pt complains of nausea. Pain: Initial:   Pain Intensity 1: 2 (about the same)  Post Session:       Gait Training (15 Minutes):  Gait training to improve and/or restore physical functioning as related to mobility. Ambulated 240 Feet (ft) with Stand-by asssistance using a Walker, rolling and minimal   related to their knee position and motion to promote proper body alignment. Therapeutic Exercise: (45 Minutes (group)):  Exercises per grid below to improve strength. Required minimal verbal cues to promote proper body alignment. Progressed range as indicated.            Date:  6/8    Date:  6/9    Date:      ACTIVITY/EXERCISE AM PM AM PM AM PM   GROUP THERAPY  [ ]  [x ]  [x ]  [ ]  [ ]  [ ]   Ankle Pumps 15   15  20         Quad Sets  15  15  20         Gluteal Sets  15 15  20         Hip ABd/ADduction  15  15  20         Straight Leg Raises  15  15  20         Knee Slides  15  15  20         Short Arc Quads  15  15  20         Long Arc Quads               Chair Slides    15  20                         B = bilateral; AA = active assistive; A = active; P = passive       Treatment/Session Assessment:         Response to Treatment:  She tolerated group therapy well. Education:  [ ] Home Exercises  [X] Fall Precautions  [X] Hip Precautions [ ] Going Home Video  [ ] Knee/Hip Prosthesis Review  [ ] Tamea Bon Management/Safety [ ] Adaptive Equipment as Needed         Interdisciplinary Collaboration:   · Registered Nurse     After treatment position/precautions:   · Supine in bed, Bed/Chair-wheels locked, Bed in low position, Caregiver at bedside, Call light within reach and RN notified     Compliance with Program/Exercises: compliant most of the time. Recommendations/Intent for next treatment session:  Treatment next visit will focus on increasing Ms. Domingo's independence with bed mobility, transfers, gait training, strength/ROM exercises, modalities for pain, and patient education.        Total Treatment Duration:  PT Patient Time In/Time Out  Time In: 1015  Time Out: Jesse Stratton, PTA

## 2017-06-09 NOTE — PROGRESS NOTES
06/09/17 0831   Oxygen Therapy   O2 Sat (%) 95 %   Pulse via Oximetry 86 beats per minute   O2 Device Nasal cannula  (weaned pt to RA)   O2 Flow Rate (L/min) 2 l/min   Incentive Spirometry Treatment   Actual Volume (ml) 1200 ml   Number of Attempts 3   Found pt on 2L NC this AM. Pt stated that she was de-sating to mid 80's at night. Patient instructed in the use of incentive spirometry. Good npc.

## 2017-06-09 NOTE — PROGRESS NOTES
Pt has been medicated at about 2030 with 10 mg oxycodone but is in excruciating pain and rates it 10 still. .  4 mg morphine given SIVP. Pt states she was fine just a while ago and now the pain is just out of control. Explain to pt that is how the block does sometimes when it is totally gone.    dressing to her left knee clean dry and intact  NV status WNL's

## 2017-06-09 NOTE — DISCHARGE INSTRUCTIONS
[id number] [facility]   Patient Discharge Instructions    Goyo King / [id number] : 1950    Admitted 2017 Discharged: 2017     IF YOU HAVE ANY PROBLEMS ONCE YOU ARE AT HOME CALL THE FOLLOWING NUMBERS:   Main office number: [phone]    Take Home Medications     · It is important that you take the medication exactly as they are prescribed. · Keep your medication in the bottles provided by the pharmacist and keep a list of the medication names, dosages, and times to be taken in your wallet. · Do not take other medications without consulting your doctor. What to do at [address] your prehospital diet. If you have excessive nausea or vomitting call your doctor's office     Home Physical Therapy is arranged. Use rolling walker when walking. Use Damian Hose stockings until we see you in the office for your follow up appointment with Dr. Simin Hannah    Patients who have had a joint replacement should not drive until you are seen for your follow up appointment by Dr. Simin Hannah. When to Call    - Call if you have a temperature greater then 101  - Unable to keep food down  - Loose control of your bladder or bowel function  - Are unable to bear any weight   - Need a pain medication refill       DISCHARGE SUMMARY from Nurse    The following personal items collected during your admission are returned to you:   Dental Appliance: Dental Appliances: None  Vision: Visual Aid: Glasses  Hearing Aid:   na  Jewelry: Jewelry: None  Clothing: Clothing: At bedside  Other Valuables:  Other Valuables: Cell Phone  Valuables sent to safe: Personal Items Sent to Safe: n/a    PATIENT INSTRUCTIONS:    After general anesthesia or intravenous sedation, for 24 hours or while taking prescription Narcotics:  · Limit your activities  · Do not drive and operate hazardous machinery  · Do not make important personal or business decisions  · Do  not drink alcoholic beverages  · If you have not urinated within 8 hours after discharge, please contact your surgeon on call. Report the following to your surgeon:  · Excessive pain, swelling, redness or odor of or around the surgical area  · Temperature over 101  · Nausea and vomiting lasting longer than 4 hours or if unable to take medications  · Any signs of decreased circulation or nerve impairment to extremity: change in color, persistent  numbness, tingling, coldness or increase pain  · Any questions, call office @ [phone]      Keep scheduled follow up appointment. If need to change, call office @ [phone]. *  Please give a list of your current medications to your Primary Care Provider. *  Please update this list whenever your medications are discontinued, doses are      changed, or new medications (including over-the-counter products) are added. *  Please carry medication information at all times in case of emergency situations. Total Knee Replacement: What to Expect at [address] [place] [address]    When you leave the hospital, you should be able to move around with a walker or crutches. But you will need someone to help you at home for the next few weeks or until you have more energy and can move around better. If there is no one to help you at home, you may go to a rehabilitation center. You will go home with a bandage and stitches or staples. Change the bandage as your doctor tells you to. Your doctor will remove your stitches or staples 10 to 21 days after your surgery. You may still have some mild pain, and the area may be swollen for 3 to 6 months after surgery. Your knee will continue to improve for 6 to 12 months. You will probably use a walker for 1 to 3 weeks and then use crutches. When you are ready, you can use a cane. You will probably be able to walk on your own in 4 to 8 weeks. You will need to do months of physical rehabilitation (rehab) after a knee replacement.  Rehab will help you strengthen the muscles of the knee and help you regain movement. After you recover, your artificial knee will allow you to do normal daily activities with less pain or no pain at all. You may be able to hike, dance, ride a bike, and play golf. Talk to your doctor about whether you can do more strenuous activities. Always tell your caregivers that you have an artificial knee. How long it will take to walk on your own, return to normal activities, and go back to work depends on your health and how well your rehabilitation (rehab) program goes. The better you do with your rehab exercises, the quicker you will get your strength and movement back. This care sheet gives you a general idea about how long it will take for you to recover. But each person recovers at a different pace. Follow the steps below to get better as quickly as possible. How can you care for yourself at home? Activity  · Rest when you feel tired. You may take a nap, but do not stay in bed all day. When you sit, use a chair with arms. You can use the arms to help you stand up. · Work with your physical therapist to find the best way to exercise. You may be able to take frequent, short walks using crutches or a walker. What you can do as your knee heals will depend on whether your new knee is cemented or uncemented. You may not be able to do certain things for a while if your new knee is uncemented. · After your knee has healed enough, you can do more strenuous activities with caution. ¨ You can golf, but use a golf cart, and do not wear shoes with spikes. ¨ You can bike on a flat road or on a stationary bike. Avoid biking up hills. ¨ Your doctor may suggest that you stay away from activities that put stress on your knee. These include tennis or badminton, squash or racquetball, contact sports like football, jumping (such as in basketball), jogging, or running. ¨ Avoid activities where you might fall. These include horseback riding, skiing, and mountain biking.   · Do not sit for more than 1 hour at a time. Get up and walk around for a while before you sit again. If you must sit for a long time, prop up your leg with a chair or footstool. This will help you avoid swelling. · Ask your doctor when you can shower. You may need to take sponge baths until your stitches or staples have been removed. · Ask your doctor when you can drive again. It may take up to 8 weeks after knee replacement surgery before it is safe for you to drive. · When you get into a car, sit on the edge of the seat. Then pull in your legs, and turn to face the front. · You should be able to do many everyday activities 3 to 6 weeks after your surgery. You will probably need to take 4 to 16 weeks off from work. When you can go back to work depends on the type of work you do and how you feel. · Ask your doctor when it is okay for you to have sex. · Do not lift anything heavier than 10 pounds and do not lift weights for 12 weeks. Diet  · By the time you leave the hospital, you should be eating your normal diet. If your stomach is upset, try bland, low-fat foods like plain rice, broiled chicken, toast, and yogurt. Your doctor may suggest that you take iron and vitamin supplements. · Drink plenty of fluids (unless your doctor tells you not to). · Eat healthy foods, and watch your portion sizes. Try to stay at your ideal weight. Too much weight puts more stress on your new knee. · You may notice that your bowel movements are not regular right after your surgery. This is common. Try to avoid constipation and straining with bowel movements. You may want to take a fiber supplement every day. If you have not had a bowel movement after a couple of days, ask your doctor about taking a mild laxative. Medicines  · Your doctor will tell you if and when you can restart your medicines. He or she will also give you instructions about taking any new medicines.   · If you take blood thinners, such as warfarin (Coumadin), clopidogrel (Plavix), or aspirin, be sure to talk to your doctor. He or she will tell you if and when to start taking those medicines again. Make sure that you understand exactly what your doctor wants you to do. · Your doctor may give you a blood-thinning medicine to prevent blood clots. If you take a blood thinner, be sure you get instructions about how to take your medicine safely. Blood thinners can cause serious bleeding problems. This medicine could be in pill form or as a shot (injection). If a shot is necessary, your doctor will tell you how to do this. · Be safe with medicines. Take pain medicines exactly as directed. ¨ If the doctor gave you a prescription medicine for pain, take it as prescribed. ¨ If you are not taking a prescription pain medicine, ask your doctor if you can take an over-the-counter medicine. ¨ Plan to take your pain medicine 30 minutes before exercises. It is easier to prevent pain before it starts than to stop it once it has started. · If you think your pain medicine is making you sick to your stomach:  ¨ Take your medicine after meals (unless your doctor has told you not to). ¨ Ask your doctor for a different pain medicine. · If your doctor prescribed antibiotics, take them as directed. Do not stop taking them just because you feel better. You need to take the full course of antibiotics. Incision care  · You will have a bandage over the cut (incision) and staples or stitches. Take the bandage off when your doctor says it is okay. · Your doctor will remove the staples or stitches 10 days to 3 weeks after the surgery and replace them with strips of tape. Leave the tape on for a week or until it falls off. Exercise  · Your rehab program will give you a number of exercises to do to help you get back your knee's range of motion and strength. Always do them as your therapist tells you. Ice and elevation  · For pain and swelling, put ice or a cold pack on the area for 10 to 20 minutes at a time.  Put a thin cloth between the ice and your skin. Other instructions  · Continue to wear your support stockings as your doctor says. These help to prevent blood clots. The length of time that you will have to wear them depends on your activity level and the amount of swelling. · Wear medical alert jewelry that says you may need antibiotics before any procedure, including dental work. You can buy this at most drugstores. · You have metal pieces in your knee. These may set off some airport metal detectors. Carry a medical alert card that says you have an artificial joint, just in case. Follow-up care is a key part of your treatment and safety. Be sure to make and go to all appointments, and call your doctor if you are having problems. It's also a good idea to know your test results and keep a list of the medicines you take. When should you call for help? Call 911 anytime you think you may need emergency care. For example, call if:  · You passed out (lost consciousness). · You have severe trouble breathing. · You have sudden chest pain and shortness of breath, or you cough up blood. Call your doctor now or seek immediate medical care if:  · You have signs of infection, such as:  ¨ Increased pain, swelling, warmth, or redness. ¨ Red streaks leading from the incision. ¨ Pus draining from the incision. ¨ A fever. · You have signs of a blood clot, such as:  ¨ Pain in your calf, back of the knee, thigh, or groin. ¨ Redness and swelling in your leg or groin. · Your incision comes open and begins to bleed, or the bleeding increases. · You have pain that does not get better after you take pain medicine. Watch closely for changes in your health, and be sure to contact your doctor if:  · You do not have a bowel movement after taking a laxative. Where can you learn more? Go to http://mars-cynthia.info/. Enter Z260 in the search box to learn more about \"Total Knee Replacement: What to Expect at Home. \"  Current as of: August 4, 2016  Content Version: 11.2  © 9251-8442 [email or web address]. Care instructions adapted under license by [employer] (which disclaims liability or warranty for this information). If you have questions about a medical condition or this instruction, always ask your healthcare professional. [address] any warranty or liability for your use of this information. These are general instructions for a healthy lifestyle:    No smoking/ No tobacco products/ Avoid exposure to second hand smoke    Surgeon General's Warning:  Quitting smoking now greatly reduces serious risk to your health. Obesity, smoking, and sedentary lifestyle greatly increases your risk for illness    A healthy diet, regular physical exercise & weight monitoring are important for maintaining a healthy lifestyle    You may be retaining fluid if you have a history of heart failure or if you experience any of the following symptoms:  Weight gain of 3 pounds or more overnight or 5 pounds in a week, increased swelling in our hands or feet or shortness of breath while lying flat in bed. Please call your doctor as soon as you notice any of these symptoms; do not wait until your next office visit. Recognize signs and symptoms of STROKE:    F-face looks uneven    A-arms unable to move or move even    S-speech slurred or non-existent    T-time-call 911 as soon as signs and symptoms begin-DO NOT go       Back to bed or wait to see if you get better-TIME IS BRAIN. The discharge information has been reviewed with the patient. The patient verbalized understanding. Information obtained by :  I understand that if any problems occur once I am at home I am to contact my physician. I understand and acknowledge receipt of the instructions indicated above. Physician's or R.N.'s Signature                                                                  Date/Time                                                                                                                                              Patient or Representative Signature                                                          Date/Time

## 2017-06-10 ENCOUNTER — HOME CARE VISIT (OUTPATIENT)
Dept: SCHEDULING | Facility: HOME HEALTH | Age: 67
End: 2017-06-10
Payer: MEDICARE

## 2017-06-10 VITALS
SYSTOLIC BLOOD PRESSURE: 140 MMHG | DIASTOLIC BLOOD PRESSURE: 80 MMHG | RESPIRATION RATE: 16 BRPM | HEART RATE: 88 BPM | TEMPERATURE: 99.4 F

## 2017-06-10 PROCEDURE — G0151 HHCP-SERV OF PT,EA 15 MIN: HCPCS

## 2017-06-10 PROCEDURE — 400013 HH SOC

## 2017-06-10 PROCEDURE — 3331090001 HH PPS REVENUE CREDIT

## 2017-06-10 PROCEDURE — 3331090002 HH PPS REVENUE DEBIT

## 2017-06-11 PROCEDURE — 3331090002 HH PPS REVENUE DEBIT

## 2017-06-11 PROCEDURE — 3331090001 HH PPS REVENUE CREDIT

## 2017-06-12 ENCOUNTER — HOME CARE VISIT (OUTPATIENT)
Dept: SCHEDULING | Facility: HOME HEALTH | Age: 67
End: 2017-06-12
Payer: MEDICARE

## 2017-06-12 VITALS
TEMPERATURE: 98 F | OXYGEN SATURATION: 98 % | RESPIRATION RATE: 16 BRPM | DIASTOLIC BLOOD PRESSURE: 73 MMHG | SYSTOLIC BLOOD PRESSURE: 153 MMHG | HEART RATE: 86 BPM

## 2017-06-12 PROCEDURE — G0151 HHCP-SERV OF PT,EA 15 MIN: HCPCS

## 2017-06-12 PROCEDURE — 3331090001 HH PPS REVENUE CREDIT

## 2017-06-12 PROCEDURE — 3331090002 HH PPS REVENUE DEBIT

## 2017-06-13 PROCEDURE — 3331090001 HH PPS REVENUE CREDIT

## 2017-06-13 PROCEDURE — 3331090002 HH PPS REVENUE DEBIT

## 2017-06-14 ENCOUNTER — HOME CARE VISIT (OUTPATIENT)
Dept: SCHEDULING | Facility: HOME HEALTH | Age: 67
End: 2017-06-14
Payer: MEDICARE

## 2017-06-14 VITALS
RESPIRATION RATE: 16 BRPM | TEMPERATURE: 97 F | SYSTOLIC BLOOD PRESSURE: 156 MMHG | DIASTOLIC BLOOD PRESSURE: 82 MMHG | HEART RATE: 79 BPM

## 2017-06-14 PROCEDURE — 3331090001 HH PPS REVENUE CREDIT

## 2017-06-14 PROCEDURE — G0151 HHCP-SERV OF PT,EA 15 MIN: HCPCS

## 2017-06-14 PROCEDURE — 3331090002 HH PPS REVENUE DEBIT

## 2017-06-15 PROCEDURE — 3331090002 HH PPS REVENUE DEBIT

## 2017-06-15 PROCEDURE — 3331090001 HH PPS REVENUE CREDIT

## 2017-06-16 ENCOUNTER — HOME CARE VISIT (OUTPATIENT)
Dept: SCHEDULING | Facility: HOME HEALTH | Age: 67
End: 2017-06-16
Payer: MEDICARE

## 2017-06-16 VITALS
TEMPERATURE: 97 F | OXYGEN SATURATION: 98 % | RESPIRATION RATE: 16 BRPM | SYSTOLIC BLOOD PRESSURE: 130 MMHG | HEART RATE: 79 BPM | DIASTOLIC BLOOD PRESSURE: 59 MMHG

## 2017-06-16 PROCEDURE — G0151 HHCP-SERV OF PT,EA 15 MIN: HCPCS

## 2017-06-16 PROCEDURE — 3331090002 HH PPS REVENUE DEBIT

## 2017-06-16 PROCEDURE — 3331090001 HH PPS REVENUE CREDIT

## 2017-06-17 PROCEDURE — 3331090002 HH PPS REVENUE DEBIT

## 2017-06-17 PROCEDURE — 3331090001 HH PPS REVENUE CREDIT

## 2017-06-18 PROCEDURE — 3331090002 HH PPS REVENUE DEBIT

## 2017-06-18 PROCEDURE — 3331090001 HH PPS REVENUE CREDIT

## 2017-06-19 ENCOUNTER — HOME CARE VISIT (OUTPATIENT)
Dept: SCHEDULING | Facility: HOME HEALTH | Age: 67
End: 2017-06-19
Payer: MEDICARE

## 2017-06-19 VITALS
RESPIRATION RATE: 17 BRPM | HEART RATE: 88 BPM | TEMPERATURE: 97.2 F | DIASTOLIC BLOOD PRESSURE: 84 MMHG | SYSTOLIC BLOOD PRESSURE: 134 MMHG

## 2017-06-19 PROCEDURE — G0157 HHC PT ASSISTANT EA 15: HCPCS

## 2017-06-19 PROCEDURE — 3331090001 HH PPS REVENUE CREDIT

## 2017-06-19 PROCEDURE — 3331090002 HH PPS REVENUE DEBIT

## 2017-06-20 PROCEDURE — 3331090002 HH PPS REVENUE DEBIT

## 2017-06-20 PROCEDURE — 3331090001 HH PPS REVENUE CREDIT

## 2017-06-21 ENCOUNTER — HOME CARE VISIT (OUTPATIENT)
Dept: SCHEDULING | Facility: HOME HEALTH | Age: 67
End: 2017-06-21
Payer: MEDICARE

## 2017-06-21 PROCEDURE — 3331090001 HH PPS REVENUE CREDIT

## 2017-06-21 PROCEDURE — 3331090002 HH PPS REVENUE DEBIT

## 2017-06-21 PROCEDURE — G0157 HHC PT ASSISTANT EA 15: HCPCS

## 2017-06-22 VITALS
TEMPERATURE: 98.2 F | RESPIRATION RATE: 17 BRPM | SYSTOLIC BLOOD PRESSURE: 150 MMHG | HEART RATE: 92 BPM | DIASTOLIC BLOOD PRESSURE: 88 MMHG

## 2017-06-22 PROCEDURE — 3331090002 HH PPS REVENUE DEBIT

## 2017-06-22 PROCEDURE — 3331090001 HH PPS REVENUE CREDIT

## 2017-06-23 ENCOUNTER — HOME CARE VISIT (OUTPATIENT)
Dept: SCHEDULING | Facility: HOME HEALTH | Age: 67
End: 2017-06-23
Payer: MEDICARE

## 2017-06-23 PROCEDURE — 3331090002 HH PPS REVENUE DEBIT

## 2017-06-23 PROCEDURE — G0157 HHC PT ASSISTANT EA 15: HCPCS

## 2017-06-23 PROCEDURE — 3331090001 HH PPS REVENUE CREDIT

## 2017-06-24 PROCEDURE — 3331090002 HH PPS REVENUE DEBIT

## 2017-06-24 PROCEDURE — 3331090001 HH PPS REVENUE CREDIT

## 2017-06-25 VITALS
TEMPERATURE: 97 F | HEART RATE: 92 BPM | DIASTOLIC BLOOD PRESSURE: 78 MMHG | SYSTOLIC BLOOD PRESSURE: 122 MMHG | RESPIRATION RATE: 18 BRPM

## 2017-06-25 PROCEDURE — 3331090002 HH PPS REVENUE DEBIT

## 2017-06-25 PROCEDURE — 3331090001 HH PPS REVENUE CREDIT

## 2017-06-26 ENCOUNTER — HOME CARE VISIT (OUTPATIENT)
Dept: SCHEDULING | Facility: HOME HEALTH | Age: 67
End: 2017-06-26
Payer: MEDICARE

## 2017-06-26 VITALS
RESPIRATION RATE: 16 BRPM | HEART RATE: 98 BPM | DIASTOLIC BLOOD PRESSURE: 80 MMHG | TEMPERATURE: 98 F | OXYGEN SATURATION: 98 % | SYSTOLIC BLOOD PRESSURE: 138 MMHG

## 2017-06-26 PROCEDURE — 3331090001 HH PPS REVENUE CREDIT

## 2017-06-26 PROCEDURE — G0151 HHCP-SERV OF PT,EA 15 MIN: HCPCS

## 2017-06-26 PROCEDURE — 3331090002 HH PPS REVENUE DEBIT

## 2017-06-27 PROCEDURE — 3331090001 HH PPS REVENUE CREDIT

## 2017-06-27 PROCEDURE — 3331090002 HH PPS REVENUE DEBIT

## 2017-06-28 PROCEDURE — 3331090001 HH PPS REVENUE CREDIT

## 2017-06-28 PROCEDURE — 3331090002 HH PPS REVENUE DEBIT

## 2017-06-29 PROCEDURE — 3331090001 HH PPS REVENUE CREDIT

## 2017-06-29 PROCEDURE — 3331090002 HH PPS REVENUE DEBIT

## 2017-06-30 ENCOUNTER — HOSPITAL ENCOUNTER (OUTPATIENT)
Dept: PHYSICAL THERAPY | Age: 67
Discharge: HOME | End: 2017-06-30
Payer: MEDICARE

## 2017-06-30 PROCEDURE — G8979 MOBILITY GOAL STATUS: HCPCS | Performed by: PHYSICAL THERAPIST

## 2017-06-30 PROCEDURE — 97161 PT EVAL LOW COMPLEX 20 MIN: CPT | Performed by: PHYSICAL THERAPIST

## 2017-06-30 PROCEDURE — G8978 MOBILITY CURRENT STATUS: HCPCS | Performed by: PHYSICAL THERAPIST

## 2017-06-30 PROCEDURE — 97110 THERAPEUTIC EXERCISES: CPT | Performed by: PHYSICAL THERAPIST

## 2017-06-30 NOTE — PROGRESS NOTES
Ambulatory/Rehab Services H2 Model Falls Risk Assessment    Risk Factor Pts. ·   Confusion/Disorientation/Impulsivity  []    4 ·   Symptomatic Depression  [x]   2 ·   Altered Elimination  []   1 ·   Dizziness/Vertigo  []   1 ·   Gender (Male)  []   1 ·   Any administered antiepileptics (anticonvulsants):  []   2 ·   Any administered benzodiazepines:  []   1 ·   Visual Impairment (specify):  []   1 ·   Portable Oxygen Use  []   1 ·   Orthostatic ? BP  []   1 ·   History of Recent Falls (within 3 mos.)  []   5     Ability to Rise from Chair (choose one) Pts. ·   Ability to rise in a single movement  []   0 ·   Pushes up, successful in one attempt  [x]   1 ·   Multiple attempts, but successful  []   3 ·   Unable to rise without assistance  []   4   Total: (5 or greater = High Risk) 3     Falls Prevention Plan:   []                Physical Limitations to Exercise (specify):   []                Mobility Assistance Device (type):   []                Exercise/Equipment Adaptation (specify):    ©2010 [facility] [address] Patent #[id number].  Federal Law prohibits the replication, distribution or use without written permission from [facility] [employer] Specific

## 2017-06-30 NOTE — PROGRESS NOTES
Fidel Mcmahan  : [id number] [facility] [address] at [place] [address] [address] [facility], [facility], [address] [place] [address] Road  [phone]   [phone]         OUTPATIENT PHYSICAL THERAPY:Initial Assessment 2017    ICD-10: Treatment Diagnosis: ( R26.2) difficulty walking, (M62.552) muscle atrophy/weakness, (M25.562) L knee pain, (M25.662) stiffness L knee  Precautions/Allergies: Other medication; Sulfa (sulfonamide antibiotics); and Ultram [tramadol] , ointments  Fall Risk Score: 3 (? 5 = High Risk)  MD Orders: Eval and Treat MEDICAL/REFERRING DIAGNOSIS:  Left Total Knee Arthroplasty on 17  DATE OF ONSET: 17  REFERRING PHYSICIAN: Ange Wetzel MD  RETURN PHYSICIAN APPOINTMENT: 17     INITIAL ASSESSMENT:  Ms. Paulina Black presents today w/ functional limitations as a result of s/p L TKA on 17 and is  progressing as anticipated w/ moderate L knee pain, swelling, stiffness, weakness and generalized immobility. Her gait remains antalgic with use of a std cane on the R. She requires UE assistance with sit-stand transfers. She lives alone and will benefit from skilled PT to address the following deficits. PROBLEM LIST (Impacting functional limitations):  1. Decreased Strength  2. Decreased ADL/Functional Activities  3. Decreased Transfer Abilities  4. Decreased Ambulation Ability/Technique  5. Decreased Balance  6. Increased Pain  7. Decreased Activity Tolerance  8. Decreased Flexibility/Joint Mobility  9. Edema/Girth INTERVENTIONS PLANNED:  1. Balance Exercise  2. Cryotherapy  3. Electrical Stimulation  4. Gait Training  5. Home Exercise Program (HEP)  6. Manual Therapy  7. Neuromuscular Re-education/Strengthening  8. Range of Motion (ROM)  9. Therapeutic Exercise/Strengthening  10. Ultrasound (US)  11. Vasopneumatic Cold Compression   TREATMENT PLAN:  Effective Dates: 17 TO 17.   Frequency/Duration: 3 times a week for 12 weeks  GOALS: (Goals have been discussed and agreed upon with patient.)  Short-Term Functional Goals: Time Frame: 6 weeks  1. Pt will be compliant and independent with HEP. 2. Pt will improve score on the LEFS to 55/80 to increase pt's overall functional mobility. 3. Pt will improve L knee AROM to 0-120deg to increase pt's ease with transfers and gait. 4. Pt will be able to sit-stand from standard height without use of UE or compensatory weight shifting to rise. 5. Pt will be able to ambulate with a near normal gait pattern for community distances with LRD or no AD. 6. Pt will subjectively report decreased frequency(2-3x/night) of waking due to L knee pain. 7. Pt will be able to ascend 4-6\" steps with good form and control to increase pt's ease with entering her home. Discharge Goals: Time Frame: 12 weeks  1. Pt will improve score on the LEFS to 66/80 to increase pt's overall functional mobility. 2.   Pt will improve FLR from  CK to  CI to increase pt's overall function. 3.   Pt will improve L knee AROM to 0-125deg to increase pt's ease with transfers, gait and balance. 4.   Pt will be able to sit-stand from toilet height w/out use of UE for assistance to rise. 5.   Pt will be able to ascend 6-8\" steps reciprocally and descend 6\" steps reciprocally with use of a rail with good form and control. 6.   Pt will ambulate with a normal gait pattern with no AD to increase pt's overall functional mobility. 7.   Pt will be DC'd from PT to HEP. 8.   Pt will be able to return to gardening, fully caring for her grandchildren and gym activities (cardio and weight machines) with confidence and manageable discomfort. 9.   Pt will only wake 1-2x nightly due to L knee discomfort. Rehabilitation Potential For Stated Goals: Good  Regarding Monica Domingo's therapy, I certify that the treatment plan above will be carried out by a therapist or under their direction.   Thank you for this referral,  Daily Sanchez, PT     Referring Physician Signature: Mabel Waldrop MD              Date                    The information in this section was collected on 6/30/17 (except where otherwise noted). HISTORY:   History of Present Injury/Illness (Reason for Referral):  Pt reports she's had L knee OA and subsequent pain for several years, however, it worsened ~ 6 months ago, which sent her to Dr. Fabby Honeycutt, which subsequently resulted in her L TKA. Past Medical History/Comorbidities:   Ms. Sergei Green  has a past medical history of Asthma; Diabetes ([facility] [address] (2012); Hypertension; Morbid obesity ([facility] [address] Thromboembolus ([facility] [address] Thyroid disease; and Unspecified adverse effect of anesthesia. She also has no past medical history of Adverse effect of anesthesia; Difficult intubation; Endocarditis; Malignant hyperthermia due to anesthesia; Nausea & vomiting; Nicotine vapor product user; Non-nicotine vapor product user; Pseudocholinesterase deficiency; or Rheumatic fever. Ms. Sergei Green  has a past surgical history that includes gyn; neurological procedure unlisted; carpal tunnel release (1984); orthopaedic; appendectomy (1966); heent; and refractive surgery (2001). She states she also had a L RCR in 2013. Social History/Living Environment:     Pt reports she lives alone in a 1 story home with 1 4-6\" step to enter. She states she has a son, daughter-in-law and grandchildren who live close to her and check in frequently. She drove herself to PT today. Prior Level of Function/Work/Activity:  Pt reports despite her L knee pain, she worked part-time at [employer] ~ 16 hours/week, and worked out at [employer] x 1 hour 4 x /week. She denies having to use a cane prior to surgery. Pt states she would like to retun to return to gardening, gym activities and her part-time job upon completion of PT. Current Medications:       Current Outpatient Prescriptions:     oxyCODONE IR (ROXICODONE) 5 mg immediate release tablet, Take 1 Tab by mouth every three (3) hours as needed.  Max Daily Amount: 40 mg. (Patient taking differently: Take 1 Tab by mouth every three (3) hours as needed for Pain.), Disp: 90 Tab, Rfl: 0    ondansetron (ZOFRAN ODT) 8 mg disintegrating tablet, Take 1 Tab by mouth every eight (8) hours as needed. , Disp: 60 Tab, Rfl: 0    acetaminophen (TYLENOL) 500 mg tablet, Take 2 Tabs by mouth every six (6) hours. , Disp: 120 Tab, Rfl: 0    aspirin 81 mg chewable tablet, Take 1 Tab by mouth two (2) times a day., Disp: 60 Tab, Rfl: 0    cyclobenzaprine (FLEXERIL) 10 mg tablet, Take 0.5 Tabs by mouth three (3) times daily. , Disp: 60 Tab, Rfl: 0    gabapentin (NEURONTIN) 100 mg capsule, Take 1 Cap by mouth two (2) times a day., Disp: 60 Cap, Rfl: 0    HYDROmorphone (DILAUDID) 2 mg tablet, Take 1 Tab by mouth every four (4) hours as needed. Max Daily Amount: 12 mg., Disp: 90 Tab, Rfl: 0    senna-docusate (PERICOLACE) 8.6-50 mg per tablet, Take 2 Tabs by mouth daily. , Disp: 120 Tab, Rfl: 0    zolpidem (AMBIEN) 5 mg tablet, Take 1 Tab by mouth nightly as needed for Sleep. Max Daily Amount: 5 mg., Disp: 30 Tab, Rfl: 0    ergocalciferol (ERGOCALCIFEROL) 50,000 unit capsule, Take 1 Cap by mouth every seven (7) days. Indications: VITAMIN D DEFICIENCY (HIGH DOSE THERAPY) (Patient taking differently: Take 50,000 Units by mouth every seven (7) days. Every Sunday  Indications: VITAMIN D DEFICIENCY (HIGH DOSE THERAPY)), Disp: 12 Cap, Rfl: 2    linagliptin-metformin (JENTADUETO XR) 5-1,000 mg TBph, Take 1 Tab by mouth daily. (Patient taking differently: Take 1 Tab by mouth daily. Indications: type 2 diabetes mellitus), Disp: 90 Tab, Rfl: 3    albuterol (PROVENTIL HFA) 90 mcg/actuation inhaler, Take 1 Puff by inhalation every four (4) hours as needed. (Patient taking differently: Take 1 Puff by inhalation every four (4) hours as needed.  Take morning of surgery per anesthesia guidelines if needed and bring to hospital), Disp: 3 Inhaler, Rfl: 1    montelukast (SINGULAIR) 10 mg tablet, TAKE 1 TABLET DAILY, Disp: 90 Tab, Rfl: 3    levothyroxine (SYNTHROID) 75 mcg tablet, TAKE 1 TABLET DAILY BEFORE BREAKFAST, Disp: 90 Tab, Rfl: 2    hydroCHLOROthiazide (HYDRODIURIL) 25 mg tablet, Take 1 Tab by mouth daily. Indications: am, Disp: 90 Tab, Rfl: 3    amLODIPine-benazepril (LOTREL) 5-10 mg per capsule, Take 1 Cap by mouth daily. , Disp: 90 Cap, Rfl: 3   Date Last Reviewed:  6/30/17   Number of Personal Factors/Comorbidities that affect the Plan of Care: 0: LOW COMPLEXITY   EXAMINATION:   Observation/Orthostatic Postural Assessment:    Atrophy: Pt with moderate atrophy noted in L quad and GS complex compared to the R. Incision: Pt's incision is healing nicely with no signs of infection. No open areas. Palpation: Pt minimally warm to touch with increased swelling and tissue density along medial and posterior L knee  Varsha's Sign: Neg      AROM/Strength:  LE AROM/Strength DATE  6/30/17 DATE     Hip Flexion R: WNL  L: WFL R:   L:    Hip Abduction  R: WFL  L: WFL R:   L:    Hip Extension  R: WFL  L: WFL R:   L:    Knee Flexion  R: WNL  L: 110 R:   L:    Knee Extension  R: WNL  L: 6 R:   L:    Ankle Dorsiflexion  R: WNL  L:WFL R:   L:   Ankle Plantarflexion  R:WNL  L:WNL R:  L:   Flexibility: HS/Quads/GS R:WNL  L:moderate tightness throughout          Functional Mobility:         Gait/Ambulation:  Pt ambulates with a std cane on the R with decreased step length and stance time on the L. She also demonstrates L knee flexion during swing phase and delayed L HS/TO sequencing. Transfers:  Pt uses B UE, as well as, compensatory weight shifting to the R to rise from a std height chair. Stairs:  Pt ascends/descends 6\" stairs with a step-to gait pattern with use of 1 rail. Balance: Pt with good static balance as observed through activities in the gym today. Dynamic: further testing required.      Standing Heel Raises: Able w/ UE     Balance:           Edema/Girth:  2+    Left Right    Initial Most Recent Initial Most Recent   Lower  Extremity  43.0cm    none           Body Structures Involved:  1. Bones  2. Joints  3. Muscles  4. Ligaments    Body Functions Affected:  1. Sensory/Pain  2. Neuromusculoskeletal  3. Movement Related  4. Skin Related Activities and Participation Affected:  1. Mobility  2. Self Care  3. Community, Social and [place]   Number of elements (examined above) that affect the Plan of Care: 1-2: LOW COMPLEXITY   CLINICAL PRESENTATION:   Presentation: Stable and uncomplicated: LOW COMPLEXITY   CLINICAL DECISION MAKING:   Outcome Measure: Tool Used: Lower Extremity Functional Scale (LEFS)  Score:  Initial: 44/80 Most Recent: X/80 (Date: -- )   Interpretation of Score: 20 questions each scored on a 5 point scale with 0 representing \"extreme difficulty or unable to perform\" and 4 representing \"no difficulty\". The lower the score, the greater the functional disability. 80/80 represents no disability. Minimal detectable change is 9 points. Score 80 79-63 62-48 47-32 31-16 15-1 0   Modifier CH CI CJ CK CL CM CN     ? Mobility - Walking and Moving Around:     - CURRENT STATUS: CK - 40%-59% impaired, limited or restricted    - GOAL STATUS: CI - 1%-19% impaired, limited or restricted    - D/C STATUS:  ---------------To be determined---------------    Medical Necessity:   · Patient is expected to demonstrate progress in strength, range of motion, balance, coordination and functional technique to decrease assistance required with gait, sit-stand transfers and stairs. and increase independence with daily activities allowing her to return to gardening, caring for her grandchildren and gym activities. .  · Patient demonstrates good rehab potential due to higher previous functional level. Reason for Services/Other Comments:  · Patient showed mild signs of depression today.  She is very social and feel she will benefit from OP PT to improve gait,balance, coordination, strength, ROM and overall mobility to allow her to return to the gym and other social activties to improve her mental health. .   Use of outcome tool(s) and clinical judgement create a POC that gives a: Clear prediction of patient's progress: LOW COMPLEXITY            TREATMENT:   (In addition to Assessment/Re-Assessment sessions the following treatments were rendered)  Pre-treatment Symptoms/Complaints:  Pt c/o increased L knee pain, stiffness, weakness, inability to sleep and immobility that is contributing to her increase in depression like symptoms and high frustration levels. Pain: Initial: 5     Post Session:  4     THERAPEUTIC EXERCISE: (30 minutes):  Exercises per grid below to improve mobility and strength. Required moderate visual, verbal and tactile cues to promote proper body alignment, promote proper body posture and promote proper body breathing techniques. Progressed repetitions as indicated. Re-assessment was performed today (see above assessment section). Separate time was dedicated today for this re-assessment. Date:  6/30/17 Date:   Date:     Activity/Exercise Parameters Parameters Parameters   QS x10     Heel slides w/ ball x10     SAQ x10     SLR x10     Bridging w/ ball x10     clams x10     HS/GS stretch w/ strap 3 x 20sec ea     Education Pat mobs/scar massage                               Treatment/Session Assessment:    · Response to Treatment:  Pt with a min decrease in pain after above exercises. Pt was able to perform exercises above with improved form and control after cueing, therefore I added those to her HEP. · Compliance with Program/Exercises: Will assess as treatment progresses. · Recommendations/Intent for next treatment session: \"Next visit will focus on advancements to more challenging activities, reduction in assistance provided and manual therapy consisting of MFR/STM to aide with decreasing pain and swelling while improving tissue mobility. \".   Total Treatment Duration:  PT Patient Time In/Time Out  Time In: 1400  Time Out: 1505Heather Cesar Castillo, PT

## 2017-07-03 ENCOUNTER — HOSPITAL ENCOUNTER (OUTPATIENT)
Dept: PHYSICAL THERAPY | Age: 67
Discharge: HOME | End: 2017-07-03
Payer: MEDICARE

## 2017-07-03 PROCEDURE — 97110 THERAPEUTIC EXERCISES: CPT | Performed by: PHYSICAL THERAPIST

## 2017-07-03 PROCEDURE — 97140 MANUAL THERAPY 1/> REGIONS: CPT | Performed by: PHYSICAL THERAPIST

## 2017-07-03 NOTE — PROGRESS NOTES
Rene Deacon  : 1950 [address] at [place] [address] [address] [facility], [facility], [address] [place] [address] Road  [phone]   [phone]         OUTPATIENT PHYSICAL THERAPY:Daily Note 7/3/2017    ICD-10: Treatment Diagnosis: ( R26.2) difficulty walking, (M62.552) muscle atrophy/weakness, (M25.562) L knee pain, (M25.662) stiffness L knee  Precautions/Allergies: Other medication; Sulfa (sulfonamide antibiotics); and Ultram [tramadol] , ointments  Fall Risk Score: 3 (? 5 = High Risk)  MD Orders: Eval and Treat MEDICAL/REFERRING DIAGNOSIS:  Left Total Knee Arthroplasty on 17  DATE OF ONSET: 17  REFERRING PHYSICIAN: Mikey Fleischer, MD  RETURN PHYSICIAN APPOINTMENT: 17     INITIAL ASSESSMENT:  Ms. Dieter Eng presents today w/ functional limitations as a result of s/p L TKA on 17 and is  progressing as anticipated w/ moderate L knee pain, swelling, stiffness, weakness and generalized immobility. Her gait remains antalgic with use of a std cane on the R. She requires UE assistance with sit-stand transfers. She lives alone and will benefit from skilled PT to address the following deficits. PROBLEM LIST (Impacting functional limitations):  1. Decreased Strength  2. Decreased ADL/Functional Activities  3. Decreased Transfer Abilities  4. Decreased Ambulation Ability/Technique  5. Decreased Balance  6. Increased Pain  7. Decreased Activity Tolerance  8. Decreased Flexibility/Joint Mobility  9. Edema/Girth INTERVENTIONS PLANNED:  1. Balance Exercise  2. Cryotherapy  3. Electrical Stimulation  4. Gait Training  5. Home Exercise Program (HEP)  6. Manual Therapy  7. Neuromuscular Re-education/Strengthening  8. Range of Motion (ROM)  9. Therapeutic Exercise/Strengthening  10. Ultrasound (US)  11. Vasopneumatic Cold Compression   TREATMENT PLAN:  Effective Dates: 17 TO 17.   Frequency/Duration: 3 times a week for 12 weeks  GOALS: (Goals have been discussed and agreed upon with patient.)  Short-Term Functional Goals: Time Frame: 6 weeks  1. Pt will be compliant and independent with HEP. 2. Pt will improve score on the LEFS to 55/80 to increase pt's overall functional mobility. 3. Pt will improve L knee AROM to 0-120deg to increase pt's ease with transfers and gait. 4. Pt will be able to sit-stand from standard height without use of UE or compensatory weight shifting to rise. 5. Pt will be able to ambulate with a near normal gait pattern for community distances with LRD or no AD. 6. Pt will subjectively report decreased frequency(2-3x/night) of waking due to L knee pain. 7. Pt will be able to ascend 4-6\" steps with good form and control to increase pt's ease with entering her home. Discharge Goals: Time Frame: 12 weeks  1. Pt will improve score on the LEFS to 66/80 to increase pt's overall functional mobility. 2.   Pt will improve FLR from  CK to  CI to increase pt's overall function. 3.   Pt will improve L knee AROM to 0-125deg to increase pt's ease with transfers, gait and balance. 4.   Pt will be able to sit-stand from toilet height w/out use of UE for assistance to rise. 5.   Pt will be able to ascend 6-8\" steps reciprocally and descend 6\" steps reciprocally with use of a rail with good form and control. 6.   Pt will ambulate with a normal gait pattern with no AD to increase pt's overall functional mobility. 7.   Pt will be DC'd from PT to HEP. 8.   Pt will be able to return to gardening, fully caring for her grandchildren and gym activities (cardio and weight machines) with confidence and manageable discomfort. 9.   Pt will only wake 1-2x nightly due to L knee discomfort. Rehabilitation Potential For Stated Goals: Good  Regarding Kofi Domingo's therapy, I certify that the treatment plan above will be carried out by a therapist or under their direction.   Thank you for this referral,  Ximena Otto, PT     Referring Physician Signature: Millicent Cooks, MD              Date                    The information in this section was collected on 6/30/17 (except where otherwise noted). HISTORY:   History of Present Injury/Illness (Reason for Referral):  Pt reports she's had L knee OA and subsequent pain for several years, however, it worsened ~ 6 months ago, which sent her to Dr. Mikey Wilson, which subsequently resulted in her L TKA. Past Medical History/Comorbidities:   Ms. Osmany Joiner  has a past medical history of Asthma; Diabetes ([facility] [address] (2012); Hypertension; Morbid obesity ([facility] [address] Thromboembolus ([facility] [address] Thyroid disease; and Unspecified adverse effect of anesthesia. She also has no past medical history of Adverse effect of anesthesia; Difficult intubation; Endocarditis; Malignant hyperthermia due to anesthesia; Nausea & vomiting; Nicotine vapor product user; Non-nicotine vapor product user; Pseudocholinesterase deficiency; or Rheumatic fever. Ms. Osmany Joiner  has a past surgical history that includes gyn; neurological procedure unlisted; carpal tunnel release (1984); orthopaedic; appendectomy (1966); heent; and refractive surgery (2001). She states she also had a L RCR in 2013. Social History/Living Environment:     Pt reports she lives alone in a 1 story home with 1 4-6\" step to enter. She states she has a son, daughter-in-law and grandchildren who live close to her and check in frequently. She drove herself to PT today. Prior Level of Function/Work/Activity:  Pt reports despite her L knee pain, she worked part-time at [employer] ~ 16 hours/week, and worked out at [employer] x 1 hour 4 x /week. She denies having to use a cane prior to surgery. Pt states she would like to retun to return to gardening, gym activities and her part-time job upon completion of PT. Current Medications:       Current Outpatient Prescriptions:     oxyCODONE IR (ROXICODONE) 5 mg immediate release tablet, Take 1 Tab by mouth every three (3) hours as needed. Max Daily Amount: 40 mg. (Patient taking differently: Take 1 Tab by mouth every three (3) hours as needed for Pain.), Disp: 90 Tab, Rfl: 0    ondansetron (ZOFRAN ODT) 8 mg disintegrating tablet, Take 1 Tab by mouth every eight (8) hours as needed. , Disp: 60 Tab, Rfl: 0    acetaminophen (TYLENOL) 500 mg tablet, Take 2 Tabs by mouth every six (6) hours. , Disp: 120 Tab, Rfl: 0    aspirin 81 mg chewable tablet, Take 1 Tab by mouth two (2) times a day., Disp: 60 Tab, Rfl: 0    cyclobenzaprine (FLEXERIL) 10 mg tablet, Take 0.5 Tabs by mouth three (3) times daily. , Disp: 60 Tab, Rfl: 0    gabapentin (NEURONTIN) 100 mg capsule, Take 1 Cap by mouth two (2) times a day., Disp: 60 Cap, Rfl: 0    HYDROmorphone (DILAUDID) 2 mg tablet, Take 1 Tab by mouth every four (4) hours as needed. Max Daily Amount: 12 mg., Disp: 90 Tab, Rfl: 0    senna-docusate (PERICOLACE) 8.6-50 mg per tablet, Take 2 Tabs by mouth daily. , Disp: 120 Tab, Rfl: 0    zolpidem (AMBIEN) 5 mg tablet, Take 1 Tab by mouth nightly as needed for Sleep. Max Daily Amount: 5 mg., Disp: 30 Tab, Rfl: 0    ergocalciferol (ERGOCALCIFEROL) 50,000 unit capsule, Take 1 Cap by mouth every seven (7) days. Indications: VITAMIN D DEFICIENCY (HIGH DOSE THERAPY) (Patient taking differently: Take 50,000 Units by mouth every seven (7) days. Every Sunday  Indications: VITAMIN D DEFICIENCY (HIGH DOSE THERAPY)), Disp: 12 Cap, Rfl: 2    linagliptin-metformin (JENTADUETO XR) 5-1,000 mg TBph, Take 1 Tab by mouth daily. (Patient taking differently: Take 1 Tab by mouth daily. Indications: type 2 diabetes mellitus), Disp: 90 Tab, Rfl: 3    albuterol (PROVENTIL HFA) 90 mcg/actuation inhaler, Take 1 Puff by inhalation every four (4) hours as needed. (Patient taking differently: Take 1 Puff by inhalation every four (4) hours as needed.  Take morning of surgery per anesthesia guidelines if needed and bring to hospital), Disp: 3 Inhaler, Rfl: 1    montelukast (SINGULAIR) 10 mg tablet, TAKE 1 TABLET DAILY, Disp: 90 Tab, Rfl: 3    levothyroxine (SYNTHROID) 75 mcg tablet, TAKE 1 TABLET DAILY BEFORE BREAKFAST, Disp: 90 Tab, Rfl: 2    hydroCHLOROthiazide (HYDRODIURIL) 25 mg tablet, Take 1 Tab by mouth daily. Indications: am, Disp: 90 Tab, Rfl: 3    amLODIPine-benazepril (LOTREL) 5-10 mg per capsule, Take 1 Cap by mouth daily. , Disp: 90 Cap, Rfl: 3   Date Last Reviewed:  6/30/17   EXAMINATION:   Observation/Orthostatic Postural Assessment:    Atrophy: Pt with moderate atrophy noted in L quad and GS complex compared to the R. Incision: Pt's incision is healing nicely with no signs of infection. No open areas. Palpation: Pt minimally warm to touch with increased swelling and tissue density along medial and posterior L knee  Varsha's Sign: Neg      AROM/Strength:  LE AROM/Strength DATE  6/30/17 DATE  7/3/17   Hip Flexion R: WNL  L: WFL R:   L:    Hip Abduction  R: WFL  L: WFL R:   L:    Hip Extension  R: WFL  L: WFL R:   L:    Knee Flexion  R: WNL  L: 110 R:   L: 120   Knee Extension  R: WNL  L: 6 R:   L: 6   Ankle Dorsiflexion  R: WNL  L:WFL R:   L:   Ankle Plantarflexion  R:WNL  L:WNL R:  L:   Flexibility: HS/Quads/GS R:WNL  L:moderate tightness throughout          Functional Mobility:         Gait/Ambulation:  Pt ambulates with a std cane on the R with decreased step length and stance time on the L. She also demonstrates L knee flexion during swing phase and delayed L HS/TO sequencing. Transfers:  Pt uses B UE, as well as, compensatory weight shifting to the R to rise from a std height chair. Stairs:  Pt ascends/descends 6\" stairs with a step-to gait pattern with use of 1 rail. Balance: Pt with good static balance as observed through activities in the gym today. Dynamic: further testing required. Standing Heel Raises: Able w/ UE     Balance:           Edema/Girth:  2+    Left Right    Initial Most Recent Initial Most Recent   Lower  Extremity  43.0cm    none           Outcome Measure:    Tool Used: Lower Extremity Functional Scale (LEFS)  Score:  Initial: 44/80 Most Recent: X/80 (Date: -- )   Interpretation of Score: 20 questions each scored on a 5 point scale with 0 representing \"extreme difficulty or unable to perform\" and 4 representing \"no difficulty\". The lower the score, the greater the functional disability. 80/80 represents no disability. Minimal detectable change is 9 points. Score 80 79-63 62-48 47-32 31-16 15-1 0   Modifier CH CI CJ CK CL CM CN     ? Mobility - Walking and Moving Around:     - CURRENT STATUS: CK - 40%-59% impaired, limited or restricted    - GOAL STATUS: CI - 1%-19% impaired, limited or restricted    - D/C STATUS:  ---------------To be determined---------------    Medical Necessity:   · Patient is expected to demonstrate progress in strength, range of motion, balance, coordination and functional technique to decrease assistance required with gait, sit-stand transfers and stairs. and increase independence with daily activities allowing her to return to gardening, caring for her grandchildren and gym activities. .  · Patient demonstrates good rehab potential due to higher previous functional level. Reason for Services/Other Comments:  · Patient showed mild signs of depression today. She is very social and feel she will benefit from OP PT to improve gait,balance, coordination, strength, ROM and overall mobility to allow her to return to the gym and other social activties to improve her mental health. .            TREATMENT:   (In addition to Assessment/Re-Assessment sessions the following treatments were rendered)  Pre-treatment Symptoms/Complaints: Pt reports L knee pain, stiffness and immobility continues. She states she is staging Tylenol along with Oxycodone to aide with decreasing pain while transitioning off of the controlled substance. She reports being able to stand to talk with her neighbor x 45min over the weekend, but experienced increased pain afterwards.   Pain: Initial: 7     Post Session:  4     Manual Therapy (20min) consisting of STM/MFR to L knee and surrounding tissue to aide with decreasing pain and swelling while improving tissue mobility and L knee AROM.     THERAPEUTIC EXERCISE: (40 minutes):  Exercises per grid below to improve mobility and strength. Required moderate visual, verbal and tactile cues to promote proper body alignment, promote proper body posture and promote proper body breathing techniques. Progressed repetitions as indicated.      Date:  6/30/17 Date:  7/3/17 Date:     Activity/Exercise Parameters Parameters Parameters   Nu step  L2, 6min    QS x10 W/ [place], 5min    Heel slides w/ ball x10 x10    SAQ x10 W/ [place], 5min    SLR x10 W/ [place] and A, 5min    Sit-stand  x10    Bridging w/ ball x10 x10    clams x10     HS/GS stretch w/ strap 3 x 20sec ea 3 x 20sec each    Education Pat mobs/scar massage Use of ice and sitting /sleeping w/ knee into extension                              L knee AROM: 6-120deg (7-3-17)    Treatment/Session Assessment: Pt 's L knee AROM continues to improve as noted above. She still demonstrates lack of full L knee extension due to L hamstring tigthness and quad weakness. She demonstrated an improved L quad contraction with use of [place] estim for reeducation. She requires multiple rest breaks during PT due to pain. Pt was encouraged to try to take at least Tylenol prior to PT to allow us to more easily progress her. · Response to Treatment:  Pt c/o a min increase in L knee/leg pain with use of [place] Estim for L quad neuromuscular reeducation. · Compliance with Program/Exercises: Pt reports compliance with HEP daily.   Recommendations/Intent for next treatment session: Next visit will focus on advancements to more challenging activities, reduction in assistance provided, manual therapy consisting of MFR/STM to aide with decreasing pain and swelling while improving tissue mobility, and [place] Estim for L quad muscle re-education.       Total Treatment Duration:  PT Patient Time In/Time Out  Time In: 0955  Time Out: 1100Heather Karyn Villaseñor, PT

## 2017-07-05 ENCOUNTER — HOSPITAL ENCOUNTER (OUTPATIENT)
Dept: PHYSICAL THERAPY | Age: 67
Discharge: HOME | End: 2017-07-05
Payer: MEDICARE

## 2017-07-05 PROCEDURE — 97110 THERAPEUTIC EXERCISES: CPT | Performed by: PHYSICAL THERAPIST

## 2017-07-05 PROCEDURE — 97140 MANUAL THERAPY 1/> REGIONS: CPT | Performed by: PHYSICAL THERAPIST

## 2017-07-05 NOTE — PROGRESS NOTES
Colby All  : [id number] at [place] [address] [address] [facility], [facility], [address] [place] [address] Road  [phone]   [phone]         OUTPATIENT PHYSICAL THERAPY:Daily Note 2017    ICD-10: Treatment Diagnosis: ( R26.2) difficulty walking, (M62.552) muscle atrophy/weakness, (M25.562) L knee pain, (M25.662) stiffness L knee  Precautions/Allergies: Other medication; Sulfa (sulfonamide antibiotics); and Ultram [tramadol] , ointments  Fall Risk Score: 3 (? 5 = High Risk)  MD Orders: Eval and Treat MEDICAL/REFERRING DIAGNOSIS:  Left Total Knee Arthroplasty on 17  DATE OF ONSET: 17  REFERRING PHYSICIAN: Imelda Balbuena MD  RETURN PHYSICIAN APPOINTMENT: 17     INITIAL ASSESSMENT:  Ms. Hiral Beltran presents today w/ functional limitations as a result of s/p L TKA on 17 and is  progressing as anticipated w/ moderate L knee pain, swelling, stiffness, weakness and generalized immobility. Her gait remains antalgic with use of a std cane on the R. She requires UE assistance with sit-stand transfers. She lives alone and will benefit from skilled PT to address the following deficits. PROBLEM LIST (Impacting functional limitations):  1. Decreased Strength  2. Decreased ADL/Functional Activities  3. Decreased Transfer Abilities  4. Decreased Ambulation Ability/Technique  5. Decreased Balance  6. Increased Pain  7. Decreased Activity Tolerance  8. Decreased Flexibility/Joint Mobility  9. Edema/Girth INTERVENTIONS PLANNED:  1. Balance Exercise  2. Cryotherapy  3. Electrical Stimulation  4. Gait Training  5. Home Exercise Program (HEP)  6. Manual Therapy  7. Neuromuscular Re-education/Strengthening  8. Range of Motion (ROM)  9. Therapeutic Exercise/Strengthening  10. Ultrasound (US)  11. Vasopneumatic Cold Compression   TREATMENT PLAN:  Effective Dates: 17 TO 17.   Frequency/Duration: 3 times a week for 12 weeks  GOALS: (Goals have been discussed and agreed upon with patient.)  Short-Term Functional Goals: Time Frame: 6 weeks  1. Pt will be compliant and independent with HEP. 2. Pt will improve score on the LEFS to 55/80 to increase pt's overall functional mobility. 3. Pt will improve L knee AROM to 0-120deg to increase pt's ease with transfers and gait. 4. Pt will be able to sit-stand from standard height without use of UE or compensatory weight shifting to rise. 5. Pt will be able to ambulate with a near normal gait pattern for community distances with LRD or no AD. 6. Pt will subjectively report decreased frequency(2-3x/night) of waking due to L knee pain. 7. Pt will be able to ascend 4-6\" steps with good form and control to increase pt's ease with entering her home. Discharge Goals: Time Frame: 12 weeks  1. Pt will improve score on the LEFS to 66/80 to increase pt's overall functional mobility. 2.   Pt will improve FLR from  CK to  CI to increase pt's overall function. 3.   Pt will improve L knee AROM to 0-125deg to increase pt's ease with transfers, gait and balance. 4.   Pt will be able to sit-stand from toilet height w/out use of UE for assistance to rise. 5.   Pt will be able to ascend 6-8\" steps reciprocally and descend 6\" steps reciprocally with use of a rail with good form and control. 6.   Pt will ambulate with a normal gait pattern with no AD to increase pt's overall functional mobility. 7.   Pt will be DC'd from PT to HEP. 8.   Pt will be able to return to gardening, fully caring for her grandchildren and gym activities (cardio and weight machines) with confidence and manageable discomfort. 9.   Pt will only wake 1-2x nightly due to L knee discomfort. Rehabilitation Potential For Stated Goals: Good  Regarding Conley Councilman Golaski's therapy, I certify that the treatment plan above will be carried out by a therapist or under their direction.   Thank you for this referral,  Anthony Holly, PT     Referring Physician Signature: Sergei Fall MD              Date                    The information in this section was collected on 6/30/17 (except where otherwise noted). HISTORY:   History of Present Injury/Illness (Reason for Referral):  Pt reports she's had L knee OA and subsequent pain for several years, however, it worsened ~ 6 months ago, which sent her to Dr. Aida Mcmillan, which subsequently resulted in her L TKA. Past Medical History/Comorbidities:   Ms. Girish Tolliver  has a past medical history of Asthma; Diabetes ([facility] (2012); Hypertension; Morbid obesity ([facility] Thromboembolus ([facility] Thyroid disease; and Unspecified adverse effect of anesthesia. She also has no past medical history of Adverse effect of anesthesia; Difficult intubation; Endocarditis; Malignant hyperthermia due to anesthesia; Nausea & vomiting; Nicotine vapor product user; Non-nicotine vapor product user; Pseudocholinesterase deficiency; or Rheumatic fever. Ms. Girish Tolliver  has a past surgical history that includes gyn; neurological procedure unlisted; carpal tunnel release (1984); orthopaedic; appendectomy (1966); heent; and refractive surgery (2001). She states she also had a L RCR in 2013. Social History/Living Environment:     Pt reports she lives alone in a 1 story home with 1 4-6\" step to enter. She states she has a son, daughter-in-law and grandchildren who live close to her and check in frequently. She drove herself to PT today. Prior Level of Function/Work/Activity:  Pt reports despite her L knee pain, she worked part-time at [employer] ~ 16 hours/week, and worked out at [employer] x 1 hour 4 x /week. She denies having to use a cane prior to surgery. Pt states she would like to retun to return to gardening, gym activities and her part-time job upon completion of PT. Current Medications:       Current Outpatient Prescriptions:     oxyCODONE IR (ROXICODONE) 5 mg immediate release tablet, Take 1 Tab by mouth every three (3) hours as needed. Max Daily Amount: 40 mg. (Patient taking differently: Take 1 Tab by mouth every three (3) hours as needed for Pain.), Disp: 90 Tab, Rfl: 0    ondansetron (ZOFRAN ODT) 8 mg disintegrating tablet, Take 1 Tab by mouth every eight (8) hours as needed. , Disp: 60 Tab, Rfl: 0    acetaminophen (TYLENOL) 500 mg tablet, Take 2 Tabs by mouth every six (6) hours. , Disp: 120 Tab, Rfl: 0    aspirin 81 mg chewable tablet, Take 1 Tab by mouth two (2) times a day., Disp: 60 Tab, Rfl: 0    cyclobenzaprine (FLEXERIL) 10 mg tablet, Take 0.5 Tabs by mouth three (3) times daily. , Disp: 60 Tab, Rfl: 0    gabapentin (NEURONTIN) 100 mg capsule, Take 1 Cap by mouth two (2) times a day., Disp: 60 Cap, Rfl: 0    HYDROmorphone (DILAUDID) 2 mg tablet, Take 1 Tab by mouth every four (4) hours as needed. Max Daily Amount: 12 mg., Disp: 90 Tab, Rfl: 0    senna-docusate (PERICOLACE) 8.6-50 mg per tablet, Take 2 Tabs by mouth daily. , Disp: 120 Tab, Rfl: 0    zolpidem (AMBIEN) 5 mg tablet, Take 1 Tab by mouth nightly as needed for Sleep. Max Daily Amount: 5 mg., Disp: 30 Tab, Rfl: 0    ergocalciferol (ERGOCALCIFEROL) 50,000 unit capsule, Take 1 Cap by mouth every seven (7) days. Indications: VITAMIN D DEFICIENCY (HIGH DOSE THERAPY) (Patient taking differently: Take 50,000 Units by mouth every seven (7) days. Every Sunday  Indications: VITAMIN D DEFICIENCY (HIGH DOSE THERAPY)), Disp: 12 Cap, Rfl: 2    linagliptin-metformin (JENTADUETO XR) 5-1,000 mg TBph, Take 1 Tab by mouth daily. (Patient taking differently: Take 1 Tab by mouth daily. Indications: type 2 diabetes mellitus), Disp: 90 Tab, Rfl: 3    albuterol (PROVENTIL HFA) 90 mcg/actuation inhaler, Take 1 Puff by inhalation every four (4) hours as needed. (Patient taking differently: Take 1 Puff by inhalation every four (4) hours as needed.  Take morning of surgery per anesthesia guidelines if needed and bring to hospital), Disp: 3 Inhaler, Rfl: 1    montelukast (SINGULAIR) 10 mg tablet, TAKE 1 TABLET DAILY, Disp: 90 Tab, Rfl: 3    levothyroxine (SYNTHROID) 75 mcg tablet, TAKE 1 TABLET DAILY BEFORE BREAKFAST, Disp: 90 Tab, Rfl: 2    hydroCHLOROthiazide (HYDRODIURIL) 25 mg tablet, Take 1 Tab by mouth daily. Indications: am, Disp: 90 Tab, Rfl: 3    amLODIPine-benazepril (LOTREL) 5-10 mg per capsule, Take 1 Cap by mouth daily. , Disp: 90 Cap, Rfl: 3   Date Last Reviewed:  6/30/17   EXAMINATION:   Observation/Orthostatic Postural Assessment:    Atrophy: Pt with moderate atrophy noted in L quad and GS complex compared to the R. Incision: Pt's incision is healing nicely with no signs of infection. No open areas. Palpation: Pt minimally warm to touch with increased swelling and tissue density along medial and posterior L knee  Varsha's Sign: Neg      AROM/Strength:  LE AROM/Strength DATE  6/30/17 DATE  7/3/17   Hip Flexion R: WNL  L: WFL R:   L:    Hip Abduction  R: WFL  L: WFL R:   L:    Hip Extension  R: WFL  L: WFL R:   L:    Knee Flexion  R: WNL  L: 110 R:   L: 120   Knee Extension  R: WNL  L: 6 R:   L: 6   Ankle Dorsiflexion  R: WNL  L:WFL R:   L:   Ankle Plantarflexion  R:WNL  L:WNL R:  L:   Flexibility: HS/Quads/GS R:WNL  L:moderate tightness throughout          Functional Mobility:         Gait/Ambulation:  Pt ambulates with a std cane on the R with decreased step length and stance time on the L. She also demonstrates L knee flexion during swing phase and delayed L HS/TO sequencing. Transfers:  Pt uses B UE, as well as, compensatory weight shifting to the R to rise from a std height chair. Stairs:  Pt ascends/descends 6\" stairs with a step-to gait pattern with use of 1 rail. Balance: Pt with good static balance as observed through activities in the gym today. Dynamic: further testing required. Standing Heel Raises: Able w/ UE     Balance:           Edema/Girth:  2+    Left Right    Initial Most Recent Initial Most Recent   Lower  Extremity  43.0cm    none           Outcome Measure:    Tool Used: Lower Extremity Functional Scale (LEFS)  Score:  Initial: 44/80 Most Recent: X/80 (Date: -- )   Interpretation of Score: 20 questions each scored on a 5 point scale with 0 representing \"extreme difficulty or unable to perform\" and 4 representing \"no difficulty\". The lower the score, the greater the functional disability. 80/80 represents no disability. Minimal detectable change is 9 points. Score 80 79-63 62-48 47-32 31-16 15-1 0   Modifier CH CI CJ CK CL CM CN     ? Mobility - Walking and Moving Around:     - CURRENT STATUS: CK - 40%-59% impaired, limited or restricted    - GOAL STATUS: CI - 1%-19% impaired, limited or restricted    - D/C STATUS:  ---------------To be determined---------------    Medical Necessity:   · Patient is expected to demonstrate progress in strength, range of motion, balance, coordination and functional technique to decrease assistance required with gait, sit-stand transfers and stairs. and increase independence with daily activities allowing her to return to gardening, caring for her grandchildren and gym activities. .  · Patient demonstrates good rehab potential due to higher previous functional level. Reason for Services/Other Comments:  · Patient showed mild signs of depression today. She is very social and feel she will benefit from OP PT to improve gait,balance, coordination, strength, ROM and overall mobility to allow her to return to the gym and other social activties to improve her mental health. .            TREATMENT:   (In addition to Assessment/Re-Assessment sessions the following treatments were rendered)  Pre-treatment Symptoms/Complaints: Pt reports a min decrease in L knee pain and stiffness over the last couple of days.   She reports compliance with Tylenol prior to PT this am.   Pain: Initial:5     Post Session:  4     Manual Therapy (10min) consisting of STM/MFR to L knee and surrounding tissue to aide with decreasing pain and swelling while improving tissue mobility and L knee AROM.     THERAPEUTIC EXERCISE: (45 minutes):  Exercises per grid below to improve mobility and strength. Required moderate visual, verbal and tactile cues to promote proper body alignment, promote proper body posture and promote proper body breathing techniques. Progressed repetitions as indicated.      Date:  6/30/17 Date:  7/3/17 Date:  7/5/15 Date:     Activity/Exercise Parameters Parameters Parameters Parameters   Nu step  L2, 6min L3, 9min    QS x10 W/ [place], 5min     Heel slides w/ ball x10 x10 x10    SAQ x10 W/ [place], 5min     SLR x10 W/ [place] and A, 5min     Sit-stand  x10 20x w/equal weight B using mirror    Bridging w/ ball x10 x10     clams x10      HS/GS stretch w/ strap 3 x 20sec ea 3 x 20sec each 3 x 20sec B on board    Education Pat mobs/scar massage Use of ice and sitting /sleeping w/ knee into extension Using Tylenol prior to PT and ice afterwards; patellar mobs and scar massage    Stairs-ascend/descend reciprocally   4x    Ladder  ( slow marching  And side-stepping)   2L each    Stdg CKC TKE   BTB, 20x    Gait in clinic without cane   300'      L knee AROM: 5-120deg (7-5-17) after manual therapy    Treatment/Session Assessment: Pt with significantly improved gait quality this am as she was able to ambulate with HS/TO seq, as well as, improved knee extension during stance phase. Her knee extension AROM remains restricted due to posterior L knee tightness, as well as, quad weakness. She did demonstrate improved quad contraction after resisted CKC TKE. She required verbal, visual and tactile cueing to work on sit-stand from a low mat with equal WB B.   · Response to Treatment:  Pt did well with addition of above exercises with only a min increase in discomfort. Sit-stand quality improved significantly after verbal cueing and use of the mirror for visual feedback. · Compliance with Program/Exercises: Pt reports compliance with HEP daily.   Recommendations/Intent for next treatment session: Next visit will focus on advancements to more challenging activities, reduction in assistance provided. Continue with balance activities and functional strength training; focus on quad strengthening and eccentric control.        Total Treatment Duration:  PT Patient Time In/Time Out  Time In: 0855  Time Out: 0955Sierra Kovacs, PT

## 2017-07-07 ENCOUNTER — HOSPITAL ENCOUNTER (OUTPATIENT)
Dept: PHYSICAL THERAPY | Age: 67
Discharge: HOME | End: 2017-07-07
Payer: MEDICARE

## 2017-07-07 PROCEDURE — 97110 THERAPEUTIC EXERCISES: CPT | Performed by: PHYSICAL THERAPIST

## 2017-07-07 NOTE — PROGRESS NOTES
Alka Fischer  : 1950 [address] at [place] [address] [address] [place] [address], [facility], [address] [place] [address] Road  [phone]   [phone]         OUTPATIENT PHYSICAL THERAPY:Daily Note 2017    ICD-10: Treatment Diagnosis: ( R26.2) difficulty walking, (M62.552) muscle atrophy/weakness, (M25.562) L knee pain, (M25.662) stiffness L knee  Precautions/Allergies: Other medication; Sulfa (sulfonamide antibiotics); and Ultram [tramadol] , ointments  Fall Risk Score: 3 (? 5 = High Risk)  MD Orders: Eval and Treat MEDICAL/REFERRING DIAGNOSIS:  Left Total Knee Arthroplasty on 17  DATE OF ONSET: 17  REFERRING PHYSICIAN: Huma Davidson MD  RETURN PHYSICIAN APPOINTMENT: 17     INITIAL ASSESSMENT:  Ms. Mae Bingham presents today w/ functional limitations as a result of s/p L TKA on 17 and is  progressing as anticipated w/ moderate L knee pain, swelling, stiffness, weakness and generalized immobility. Her gait remains antalgic with use of a std cane on the R. She requires UE assistance with sit-stand transfers. She lives alone and will benefit from skilled PT to address the following deficits. PROBLEM LIST (Impacting functional limitations):  1. Decreased Strength  2. Decreased ADL/Functional Activities  3. Decreased Transfer Abilities  4. Decreased Ambulation Ability/Technique  5. Decreased Balance  6. Increased Pain  7. Decreased Activity Tolerance  8. Decreased Flexibility/Joint Mobility  9. Edema/Girth INTERVENTIONS PLANNED:  1. Balance Exercise  2. Cryotherapy  3. Electrical Stimulation  4. Gait Training  5. Home Exercise Program (HEP)  6. Manual Therapy  7. Neuromuscular Re-education/Strengthening  8. Range of Motion (ROM)  9. Therapeutic Exercise/Strengthening  10. Ultrasound (US)  11. Vasopneumatic Cold Compression   TREATMENT PLAN:  Effective Dates: 17 TO 17.   Frequency/Duration: 3 times a week for 12 weeks  GOALS: (Goals have been discussed and agreed upon with patient.)  Short-Term Functional Goals: Time Frame: 6 weeks  1. Pt will be compliant and independent with HEP. 2. Pt will improve score on the LEFS to 55/80 to increase pt's overall functional mobility. 3. Pt will improve L knee AROM to 0-120deg to increase pt's ease with transfers and gait. 4. Pt will be able to sit-stand from standard height without use of UE or compensatory weight shifting to rise. 5. Pt will be able to ambulate with a near normal gait pattern for community distances with LRD or no AD. 6. Pt will subjectively report decreased frequency(2-3x/night) of waking due to L knee pain. 7. Pt will be able to ascend 4-6\" steps with good form and control to increase pt's ease with entering her home. Discharge Goals: Time Frame: 12 weeks  1. Pt will improve score on the LEFS to 66/80 to increase pt's overall functional mobility. 2.   Pt will improve FLR from  CK to  CI to increase pt's overall function. 3.   Pt will improve L knee AROM to 0-125deg to increase pt's ease with transfers, gait and balance. 4.   Pt will be able to sit-stand from toilet height w/out use of UE for assistance to rise. 5.   Pt will be able to ascend 6-8\" steps reciprocally and descend 6\" steps reciprocally with use of a rail with good form and control. 6.   Pt will ambulate with a normal gait pattern with no AD to increase pt's overall functional mobility. 7.   Pt will be DC'd from PT to HEP. 8.   Pt will be able to return to gardening, fully caring for her grandchildren and gym activities (cardio and weight machines) with confidence and manageable discomfort. 9.   Pt will only wake 1-2x nightly due to L knee discomfort. Rehabilitation Potential For Stated Goals: Good  Regarding Conley Councilman Golaski's therapy, I certify that the treatment plan above will be carried out by a therapist or under their direction.   Thank you for this referral,  Anthony Holly, PT     Referring Physician Signature: Millicent Cooks, MD              Date                    The information in this section was collected on 6/30/17 (except where otherwise noted). HISTORY:   History of Present Injury/Illness (Reason for Referral):  Pt reports she's had L knee OA and subsequent pain for several years, however, it worsened ~ 6 months ago, which sent her to Dr. Mikey Wilson, which subsequently resulted in her L TKA. Past Medical History/Comorbidities:   Ms. Osmany Joiner  has a past medical history of Asthma; Diabetes ([facility] [address] (2012); Hypertension; Morbid obesity ([facility] [address] Thromboembolus ([facility] [address] Thyroid disease; and Unspecified adverse effect of anesthesia. She also has no past medical history of Adverse effect of anesthesia; Difficult intubation; Endocarditis; Malignant hyperthermia due to anesthesia; Nausea & vomiting; Nicotine vapor product user; Non-nicotine vapor product user; Pseudocholinesterase deficiency; or Rheumatic fever. Ms. Osmany Joiner  has a past surgical history that includes gyn; neurological procedure unlisted; carpal tunnel release (1984); orthopaedic; appendectomy (1966); heent; and refractive surgery (2001). She states she also had a L RCR in 2013. Social History/Living Environment:     Pt reports she lives alone in a 1 story home with 1 4-6\" step to enter. She states she has a son, daughter-in-law and grandchildren who live close to her and check in frequently. She drove herself to PT today. Prior Level of Function/Work/Activity:  Pt reports despite her L knee pain, she worked part-time at [employer] ~ 16 hours/week, and worked out at [employer] x 1 hour 4 x /week. She denies having to use a cane prior to surgery. Pt states she would like to retun to return to gardening, gym activities and her part-time job upon completion of PT. Current Medications:       Current Outpatient Prescriptions:     oxyCODONE IR (ROXICODONE) 5 mg immediate release tablet, Take 1 Tab by mouth every three (3) hours as needed. Max Daily Amount: 40 mg. (Patient taking differently: Take 1 Tab by mouth every three (3) hours as needed for Pain.), Disp: 90 Tab, Rfl: 0    ondansetron (ZOFRAN ODT) 8 mg disintegrating tablet, Take 1 Tab by mouth every eight (8) hours as needed. , Disp: 60 Tab, Rfl: 0    acetaminophen (TYLENOL) 500 mg tablet, Take 2 Tabs by mouth every six (6) hours. , Disp: 120 Tab, Rfl: 0    aspirin 81 mg chewable tablet, Take 1 Tab by mouth two (2) times a day., Disp: 60 Tab, Rfl: 0    cyclobenzaprine (FLEXERIL) 10 mg tablet, Take 0.5 Tabs by mouth three (3) times daily. , Disp: 60 Tab, Rfl: 0    gabapentin (NEURONTIN) 100 mg capsule, Take 1 Cap by mouth two (2) times a day., Disp: 60 Cap, Rfl: 0    HYDROmorphone (DILAUDID) 2 mg tablet, Take 1 Tab by mouth every four (4) hours as needed. Max Daily Amount: 12 mg., Disp: 90 Tab, Rfl: 0    senna-docusate (PERICOLACE) 8.6-50 mg per tablet, Take 2 Tabs by mouth daily. , Disp: 120 Tab, Rfl: 0    zolpidem (AMBIEN) 5 mg tablet, Take 1 Tab by mouth nightly as needed for Sleep. Max Daily Amount: 5 mg., Disp: 30 Tab, Rfl: 0    ergocalciferol (ERGOCALCIFEROL) 50,000 unit capsule, Take 1 Cap by mouth every seven (7) days. Indications: VITAMIN D DEFICIENCY (HIGH DOSE THERAPY) (Patient taking differently: Take 50,000 Units by mouth every seven (7) days. Every Sunday  Indications: VITAMIN D DEFICIENCY (HIGH DOSE THERAPY)), Disp: 12 Cap, Rfl: 2    linagliptin-metformin (JENTADUETO XR) 5-1,000 mg TBph, Take 1 Tab by mouth daily. (Patient taking differently: Take 1 Tab by mouth daily. Indications: type 2 diabetes mellitus), Disp: 90 Tab, Rfl: 3    albuterol (PROVENTIL HFA) 90 mcg/actuation inhaler, Take 1 Puff by inhalation every four (4) hours as needed. (Patient taking differently: Take 1 Puff by inhalation every four (4) hours as needed.  Take morning of surgery per anesthesia guidelines if needed and bring to hospital), Disp: 3 Inhaler, Rfl: 1    montelukast (SINGULAIR) 10 mg tablet, TAKE 1 TABLET DAILY, Disp: 90 Tab, Rfl: 3    levothyroxine (SYNTHROID) 75 mcg tablet, TAKE 1 TABLET DAILY BEFORE BREAKFAST, Disp: 90 Tab, Rfl: 2    hydroCHLOROthiazide (HYDRODIURIL) 25 mg tablet, Take 1 Tab by mouth daily. Indications: am, Disp: 90 Tab, Rfl: 3    amLODIPine-benazepril (LOTREL) 5-10 mg per capsule, Take 1 Cap by mouth daily. , Disp: 90 Cap, Rfl: 3   Date Last Reviewed:  6/30/17   EXAMINATION:   Observation/Orthostatic Postural Assessment:    Atrophy: Pt with moderate atrophy noted in L quad and GS complex compared to the R. Incision: Pt's incision is healing nicely with no signs of infection. No open areas. Palpation: Pt minimally warm to touch with increased swelling and tissue density along medial and posterior L knee  Varsha's Sign: Neg      AROM/Strength:  LE AROM/Strength DATE  6/30/17 DATE  7/3/17   Hip Flexion R: WNL  L: WFL R:   L:    Hip Abduction  R: WFL  L: WFL R:   L:    Hip Extension  R: WFL  L: WFL R:   L:    Knee Flexion  R: WNL  L: 110 R:   L: 120   Knee Extension  R: WNL  L: 6 R:   L: 6   Ankle Dorsiflexion  R: WNL  L:WFL R:   L:   Ankle Plantarflexion  R:WNL  L:WNL R:  L:   Flexibility: HS/Quads/GS R:WNL  L:moderate tightness throughout          Functional Mobility:         Gait/Ambulation:  Pt ambulates with a std cane on the R with decreased step length and stance time on the L. She also demonstrates L knee flexion during swing phase and delayed L HS/TO sequencing. Transfers:  Pt uses B UE, as well as, compensatory weight shifting to the R to rise from a std height chair. Stairs:  Pt ascends/descends 6\" stairs with a step-to gait pattern with use of 1 rail. Balance: Pt with good static balance as observed through activities in the gym today. Dynamic: further testing required. Standing Heel Raises: Able w/ UE     Balance:           Edema/Girth:  2+    Left Right    Initial Most Recent Initial Most Recent   Lower  Extremity  43.0cm    none           Outcome Measure:    Tool Used: Lower Extremity Functional Scale (LEFS)  Score:  Initial: 44/80 Most Recent: X/80 (Date: -- )   Interpretation of Score: 20 questions each scored on a 5 point scale with 0 representing \"extreme difficulty or unable to perform\" and 4 representing \"no difficulty\". The lower the score, the greater the functional disability. 80/80 represents no disability. Minimal detectable change is 9 points. Score 80 79-63 62-48 47-32 31-16 15-1 0   Modifier CH CI CJ CK CL CM CN     ? Mobility - Walking and Moving Around:     - CURRENT STATUS: CK - 40%-59% impaired, limited or restricted    - GOAL STATUS: CI - 1%-19% impaired, limited or restricted    - D/C STATUS:  ---------------To be determined---------------    Medical Necessity:   · Patient is expected to demonstrate progress in strength, range of motion, balance, coordination and functional technique to decrease assistance required with gait, sit-stand transfers and stairs. and increase independence with daily activities allowing her to return to gardening, caring for her grandchildren and gym activities. .  · Patient demonstrates good rehab potential due to higher previous functional level. Reason for Services/Other Comments:  · Patient showed mild signs of depression today. She is very social and feel she will benefit from OP PT to improve gait,balance, coordination, strength, ROM and overall mobility to allow her to return to the gym and other social activties to improve her mental health. .            TREATMENT:   (In addition to Assessment/Re-Assessment sessions the following treatments were rendered)  Pre-treatment Symptoms/Complaints: Pt reports she's not feeling well today. She states she's got an upset stomach, and is more uncomfortable because she took her grandchildren to the [facility] x 4 hours yesterday. She denies compliance with HEP since her last visit.   Pain: Initial:6     Post Session:  5       THERAPEUTIC EXERCISE: (35 minutes):  Exercises per grid below to improve mobility and strength. Required moderate visual, verbal and tactile cues to promote proper body alignment, promote proper body posture and promote proper body breathing techniques. Progressed repetitions as indicated.      Date:  6/30/17 Date:  7/3/17 Date:  7/5/17 Date:  7/7/17     Activity/Exercise Parameters Parameters Parameters Parameters     Nu step  L2, 6min L3, 9min L4, 8min     QS x10 W/ [place], 5min       Heel slides w/ ball x10 x10 x10      SAQ x10 W/ [place], 5min       SLR x10 W/ [place] and A, 5min       Sit-stand  x10 20x w/equal weight B using mirror 20x     Seated knee Ext     RTB, 20x     Seated HSC    RTB, 20x     Bridging w/ ball x10 x10       clams x10        HS/GS stretch w/ strap 3 x 20sec ea 3 x 20sec each 3 x 20sec B on board      Education Pat mobs/scar massage Use of ice and sitting /sleeping w/ knee into extension Using Tylenol prior to PT and ice afterwards; patellar mobs and scar massage Eating/drinking fluids/Tylenol prior to PT     Stairs-ascend/descend reciprocally   4x      Ladder  ( slow marching  And side-stepping)   2L each 4L marching     Stdg CKC TKE   BTB, 20x      Gait in clinic without cane   300' 300\"       L knee AROM: 5-120deg (7-5-17) after manual therapy    Treatment/Session Assessment: Despite pt not feeling well today and having to stop therapy early, she demonstrated improved gait and sit-stand quality due to improving B LE strength. She was challenged by the increase in resistance on the nu-step, as well as, marching activity with ladder due to not feeling well, decreased endurance, and decreased balance/proprioception. · Response to Treatment:  Pt required multiple rest breaks today due to not feeling well. I was going to ass LAQ/HSC w/ tband to HEP, however, decided to wait until next visit when she is feeling better.    · Compliance with Program/Exercises: Pt denies compliance since last visit due to not feeling well.  Recommendations/Intent for next treatment session: Next visit will focus on advancements to more challenging activities, reduction in assistance provided. Continue with balance activities and functional strength training; focus on quad strengthening and eccentric control.      Future Appointments  Date Time Provider Мария Rosales   7/10/2017 9:00 AM Orpha Failing, PT [facility]   7/12/2017 9:00 AM [facility]   [facility]/14[facility]2017 9:00 [facility]   7/17/2017 9:00 AM [facility] Failing, [facility]   7/19/2017 9:00 AM Orpha Failing, PT [facility]   7/21/2017 9:00 AM [facility]ailing[facility]   7/24/2017 9:00 AM Orpha Failing, PT [facility]   7/26/2017 9:00 AM Orpha Failing, PT [facility]   7/28/2017 9:00 AM Orpha Failing, PT [facility]   7/31/2017 9:00 AM Orpha Failing, PT [facility]   8/4/2017 9:30 AM Lola Ordoñez MD [facility]       Total Treatment Duration:  PT Patient Time In/Time Out  Time In: 0905  Time Out: 0940Sierra James, PT

## 2017-07-10 ENCOUNTER — HOSPITAL ENCOUNTER (OUTPATIENT)
Dept: PHYSICAL THERAPY | Age: 67
Discharge: HOME | End: 2017-07-10
Payer: MEDICARE

## 2017-07-10 PROCEDURE — 97140 MANUAL THERAPY 1/> REGIONS: CPT | Performed by: PHYSICAL THERAPIST

## 2017-07-10 PROCEDURE — 97110 THERAPEUTIC EXERCISES: CPT | Performed by: PHYSICAL THERAPIST

## 2017-07-10 NOTE — PROGRESS NOTES
Maximo Britt  : 1950 [id number] at [facility] [address] [place] [address], [facility], [address] [place] [address]  [phone]   [phone]         OUTPATIENT PHYSICAL THERAPY:Daily Note 7/10/2017    ICD-10: Treatment Diagnosis: ( R26.2) difficulty walking, (M62.552) muscle atrophy/weakness, (M25.562) L knee pain, (M25.662) stiffness L knee  Precautions/Allergies: Other medication; Sulfa (sulfonamide antibiotics); and Ultram [tramadol] , ointments  Fall Risk Score: 3 (? 5 = High Risk)  MD Orders: Eval and Treat MEDICAL/REFERRING DIAGNOSIS:  Left Total Knee Arthroplasty on 17  DATE OF ONSET: 17  REFERRING PHYSICIAN: Joshua Hanson MD  RETURN PHYSICIAN APPOINTMENT: 17     INITIAL ASSESSMENT:  Ms. Melo Carranza presents today w/ functional limitations as a result of s/p L TKA on 17 and is  progressing as anticipated w/ moderate L knee pain, swelling, stiffness, weakness and generalized immobility. Her gait remains antalgic with use of a std cane on the R. She requires UE assistance with sit-stand transfers. She lives alone and will benefit from skilled PT to address the following deficits. PROBLEM LIST (Impacting functional limitations):  1. Decreased Strength  2. Decreased ADL/Functional Activities  3. Decreased Transfer Abilities  4. Decreased Ambulation Ability/Technique  5. Decreased Balance  6. Increased Pain  7. Decreased Activity Tolerance  8. Decreased Flexibility/Joint Mobility  9. Edema/Girth INTERVENTIONS PLANNED:  1. Balance Exercise  2. Cryotherapy  3. Electrical Stimulation  4. Gait Training  5. Home Exercise Program (HEP)  6. Manual Therapy  7. Neuromuscular Re-education/Strengthening  8. Range of Motion (ROM)  9. Therapeutic Exercise/Strengthening  10. Ultrasound (US)  11. Vasopneumatic Cold Compression   TREATMENT PLAN:  Effective Dates: 17 TO 17.   Frequency/Duration: 3 times a week for 12 weeks  GOALS: (Goals have been discussed and agreed upon with patient.)  Short-Term Functional Goals: Time Frame: 6 weeks  1. Pt will be compliant and independent with HEP. 2. Pt will improve score on the LEFS to 55/80 to increase pt's overall functional mobility. 3. Pt will improve L knee AROM to 0-120deg to increase pt's ease with transfers and gait. 4. Pt will be able to sit-stand from standard height without use of UE or compensatory weight shifting to rise. 5. Pt will be able to ambulate with a near normal gait pattern for community distances with LRD or no AD. 6. Pt will subjectively report decreased frequency(2-3x/night) of waking due to L knee pain. 7. Pt will be able to ascend 4-6\" steps with good form and control to increase pt's ease with entering her home. Discharge Goals: Time Frame: 12 weeks  1. Pt will improve score on the LEFS to 66/80 to increase pt's overall functional mobility. 2.   Pt will improve FLR from  CK to  CI to increase pt's overall function. 3.   Pt will improve L knee AROM to 0-125deg to increase pt's ease with transfers, gait and balance. 4.   Pt will be able to sit-stand from toilet height w/out use of UE for assistance to rise. 5.   Pt will be able to ascend 6-8\" steps reciprocally and descend 6\" steps reciprocally with use of a rail with good form and control. 6.   Pt will ambulate with a normal gait pattern with no AD to increase pt's overall functional mobility. 7.   Pt will be DC'd from PT to HEP. 8.   Pt will be able to return to gardening, fully caring for her grandchildren and gym activities (cardio and weight machines) with confidence and manageable discomfort. 9.   Pt will only wake 1-2x nightly due to L knee discomfort. Rehabilitation Potential For Stated Goals: Good  Regarding Rodney Domingo's therapy, I certify that the treatment plan above will be carried out by a therapist or under their direction.   Thank you for this referral,  Mima Katz, PT     Referring Physician Signature: Cherelle Bush MD              Date                    The information in this section was collected on 6/30/17 (except where otherwise noted). HISTORY:   History of Present Injury/Illness (Reason for Referral):  Pt reports she's had L knee OA and subsequent pain for several years, however, it worsened ~ 6 months ago, which sent her to Dr. Sharmaine Mcrae, which subsequently resulted in her L TKA. Past Medical History/Comorbidities:   Ms. Estefany Rodirguez  has a past medical history of Asthma; Diabetes ([facility]) (2012); Hypertension; Morbid obesity ([facility]); Thromboembolus ([facility]); Thyroid disease; and Unspecified adverse effect of anesthesia. She also has no past medical history of Adverse effect of anesthesia; Difficult intubation; Endocarditis; Malignant hyperthermia due to anesthesia; Nausea & vomiting; Nicotine vapor product user; Non-nicotine vapor product user; Pseudocholinesterase deficiency; or Rheumatic fever. Ms. Estefany Rodriguez  has a past surgical history that includes gyn; neurological procedure unlisted; carpal tunnel release (1984); orthopaedic; appendectomy (1966); heent; and refractive surgery (2001). She states she also had a L RCR in 2013. Social History/Living Environment:     Pt reports she lives alone in a 1 story home with 1 4-6\" step to enter. She states she has a son, daughter-in-law and grandchildren who live close to her and check in frequently. She drove herself to PT today. Prior Level of Function/Work/Activity:  Pt reports despite her L knee pain, she worked part-time at [employer] ~ 16 hours/week, and worked out at [employer] x 1 hour 4 x /week. She denies having to use a cane prior to surgery. Pt states she would like to retun to return to gardening, gym activities and her part-time job upon completion of PT. Current Medications:       Current Outpatient Prescriptions:     oxyCODONE IR (ROXICODONE) 5 mg immediate release tablet, Take 1 Tab by mouth every three (3) hours as needed.  Max Daily Amount: 40 mg. (Patient taking differently: Take 1 Tab by mouth every three (3) hours as needed for Pain.), Disp: 90 Tab, Rfl: 0    ondansetron (ZOFRAN ODT) 8 mg disintegrating tablet, Take 1 Tab by mouth every eight (8) hours as needed. , Disp: 60 Tab, Rfl: 0    acetaminophen (TYLENOL) 500 mg tablet, Take 2 Tabs by mouth every six (6) hours. , Disp: 120 Tab, Rfl: 0    aspirin 81 mg chewable tablet, Take 1 Tab by mouth two (2) times a day., Disp: 60 Tab, Rfl: 0    cyclobenzaprine (FLEXERIL) 10 mg tablet, Take 0.5 Tabs by mouth three (3) times daily. , Disp: 60 Tab, Rfl: 0    gabapentin (NEURONTIN) 100 mg capsule, Take 1 Cap by mouth two (2) times a day., Disp: 60 Cap, Rfl: 0    HYDROmorphone (DILAUDID) 2 mg tablet, Take 1 Tab by mouth every four (4) hours as needed. Max Daily Amount: 12 mg., Disp: 90 Tab, Rfl: 0    senna-docusate (PERICOLACE) 8.6-50 mg per tablet, Take 2 Tabs by mouth daily. , Disp: 120 Tab, Rfl: 0    zolpidem (AMBIEN) 5 mg tablet, Take 1 Tab by mouth nightly as needed for Sleep. Max Daily Amount: 5 mg., Disp: 30 Tab, Rfl: 0    ergocalciferol (ERGOCALCIFEROL) 50,000 unit capsule, Take 1 Cap by mouth every seven (7) days. Indications: VITAMIN D DEFICIENCY (HIGH DOSE THERAPY) (Patient taking differently: Take 50,000 Units by mouth every seven (7) days. Every Sunday  Indications: VITAMIN D DEFICIENCY (HIGH DOSE THERAPY)), Disp: 12 Cap, Rfl: 2    linagliptin-metformin (JENTADUETO XR) 5-1,000 mg TBph, Take 1 Tab by mouth daily. (Patient taking differently: Take 1 Tab by mouth daily. Indications: type 2 diabetes mellitus), Disp: 90 Tab, Rfl: 3    albuterol (PROVENTIL HFA) 90 mcg/actuation inhaler, Take 1 Puff by inhalation every four (4) hours as needed. (Patient taking differently: Take 1 Puff by inhalation every four (4) hours as needed.  Take morning of surgery per anesthesia guidelines if needed and bring to hospital), Disp: 3 Inhaler, Rfl: 1    montelukast (SINGULAIR) 10 mg tablet, TAKE 1 TABLET DAILY, Disp: 90 Tab, Rfl: 3    levothyroxine (SYNTHROID) 75 mcg tablet, TAKE 1 TABLET DAILY BEFORE BREAKFAST, Disp: 90 Tab, Rfl: 2    hydroCHLOROthiazide (HYDRODIURIL) 25 mg tablet, Take 1 Tab by mouth daily. Indications: am, Disp: 90 Tab, Rfl: 3    amLODIPine-benazepril (LOTREL) 5-10 mg per capsule, Take 1 Cap by mouth daily. , Disp: 90 Cap, Rfl: 3   Date Last Reviewed:  6/30/17   EXAMINATION:   Observation/Orthostatic Postural Assessment:    Atrophy: Pt with moderate atrophy noted in L quad and GS complex compared to the R. Incision: Pt's incision is healing nicely with no signs of infection. No open areas. Palpation: Pt minimally warm to touch with increased swelling and tissue density along medial and posterior L knee  Varsha's Sign: Neg      AROM/Strength:  LE AROM/Strength DATE  6/30/17 DATE  7/3/17   Hip Flexion R: WNL  L: WFL R:   L:    Hip Abduction  R: WFL  L: WFL R:   L:    Hip Extension  R: WFL  L: WFL R:   L:    Knee Flexion  R: WNL  L: 110 R:   L: 120   Knee Extension  R: WNL  L: 6 R:   L: 6   Ankle Dorsiflexion  R: WNL  L:WFL R:   L:   Ankle Plantarflexion  R:WNL  L:WNL R:  L:   Flexibility: HS/Quads/GS R:WNL  L:moderate tightness throughout          Functional Mobility:         Gait/Ambulation:  Pt ambulates with a std cane on the R with decreased step length and stance time on the L. She also demonstrates L knee flexion during swing phase and delayed L HS/TO sequencing. Transfers:  Pt uses B UE, as well as, compensatory weight shifting to the R to rise from a std height chair. Stairs:  Pt ascends/descends 6\" stairs with a step-to gait pattern with use of 1 rail. Balance: Pt with good static balance as observed through activities in the gym today. Dynamic: further testing required.      Standing Heel Raises: Able w/ UE     Balance:           Edema/Girth:  2+    Left Right    Initial Most Recent Initial Most Recent   Lower  Extremity  43.0cm    none           Outcome Measure: Tool Used: Lower Extremity Functional Scale (LEFS)  Score:  Initial: 44/80 Most Recent: X/80 (Date: -- )   Interpretation of Score: 20 questions each scored on a 5 point scale with 0 representing \"extreme difficulty or unable to perform\" and 4 representing \"no difficulty\". The lower the score, the greater the functional disability. 80/80 represents no disability. Minimal detectable change is 9 points. Score 80 79-63 62-48 47-32 31-16 15-1 0   Modifier CH CI CJ CK CL CM CN     ? Mobility - Walking and Moving Around:     - CURRENT STATUS: CK - 40%-59% impaired, limited or restricted    - GOAL STATUS: CI - 1%-19% impaired, limited or restricted    - D/C STATUS:  ---------------To be determined---------------    Medical Necessity:   · Patient is expected to demonstrate progress in strength, range of motion, balance, coordination and functional technique to decrease assistance required with gait, sit-stand transfers and stairs. and increase independence with daily activities allowing her to return to gardening, caring for her grandchildren and gym activities. .  · Patient demonstrates good rehab potential due to higher previous functional level. Reason for Services/Other Comments:  · Patient showed mild signs of depression today. She is very social and feel she will benefit from OP PT to improve gait,balance, coordination, strength, ROM and overall mobility to allow her to return to the gym and other social activties to improve her mental health. .          ch  TREATMENT:   (In addition to Assessment/Re-Assessment sessions the following treatments were rendered)  Pre-treatment Symptoms/Complaints: Pt reports she's feeling much better today, and did not take tylenol prior to PT today. She reports going to [employer] and the movies over the weekend with only minimal pain.    Pain: Initial:2     Post Session:  4     MANUAL THERAPY: (10min): STM/MFR to L knee to aide with decreasing tissue density/tightness and pain to aide with improving L knee AROM. THERAPEUTIC EXERCISE: (45 minutes):  Exercises per grid below to improve mobility and strength. Required moderate visual, verbal and tactile cues to promote proper body alignment, promote proper body posture and promote proper body breathing techniques. Progressed repetitions as indicated.      Date:  6/30/17 Date:  7/3/17 Date:  7/5/17 Date:  7/7/17 Date:  7/10/17    Activity/Exercise Parameters Parameters Parameters Parameters     Nu step  L2, 6min L3, 9min L4, 8min L4, 9min    QS x10 W/ [place], 5min       Heel slides w/ ball x10 x10 x10      SAQ x10 W/ [place], 5min       SLR x10 W/ [place] and A, 5min       Sit-stand  x10 20x w/equal weight B using mirror 20x 20x    Seated knee Ext     RTB, 20x     Seated HSC    RTB, 20x     Bridging w/ ball x10 x10       clams x10        HS/GS stretch w/ strap 3 x 20sec ea 3 x 20sec each 3 x 20sec B on board  3 x 20sec on board    Education Pat mobs/scar massage Use of ice and sitting /sleeping w/ knee into extension Using Tylenol prior to PT and ice afterwards; patellar mobs and scar massage Eating/drinking fluids/Tylenol prior to PT     Stairs-ascend/descend reciprocally   4x  4x    3\" step-ups/downs     20x    Ladder  ( slow marching, side-stepping, diagonals)   2L each 4L marching 3L each dir    Stdg CKC TKE   BTB, 20x  BTB, 20x    Gait in clinic without cane   300' 300\" No cane 300'      L knee AROM: 4-116deg (7-5-17) after manual therapy. Treatment/Session Assessment: Pt 's L quad remains very weak with decreased eccentric control. She demonstrated improved form with 3\"/6\" step-ups/overs with increased repetitions indicating improving quad performance and muscle memory. · Response to Treatment: Pt's balance is improving with ladder activity. She remains challenged with diagonals. She c/o min increase in pain with treatment.  She was feeling better, therefore I added seated HSC/LAQ and sit-stand to her HEP that we did last visit. · Compliance with Program/Exercises: Pt denies compliance since last visit due to not feeling well. Recommendations/Intent for next treatment session: Next visit will focus on advancements to more challenging activities, reduction in assistance provided. Continue with balance activities and functional strength training; focus on quad strengthening and eccentric control.      Future Appointments  Date Time Provider Мария Rosales   7/12/2017 9:00 AM Barabara Oz, PT [facility]   7/14/2017 9:00 AM Barabara Oz, PT [facility]   7/17/2017 9:00 AM Barabara Oz, PT [facility]   7/19/2017 9:00 AM Barabara Oz, PT [facility]   7/21/2017 9:00 AM Barabara Oz, PT [facility]   7/24/2017 9:00 AM Barabara Oz, PT [facility]   7/26/2017 9:00 AM Barabara Oz, PT [facility]   7/28/2017 9:00 AM Barabara Oz, PT [facility]   7/31/2017 9:00 AM Barabara Oz, PT [facility]   8/4/2017 9:30 AM Rosalba Brush MD [facility]       Total Treatment Duration:  PT Patient Time In/Time Out  Time In: 0900  Time Out: 1000Heamohinder Taylor, PT

## 2017-07-12 ENCOUNTER — HOSPITAL ENCOUNTER (OUTPATIENT)
Dept: PHYSICAL THERAPY | Age: 67
Discharge: HOME | End: 2017-07-12
Payer: MEDICARE

## 2017-07-12 PROCEDURE — 97110 THERAPEUTIC EXERCISES: CPT | Performed by: PHYSICAL THERAPIST

## 2017-07-12 PROCEDURE — 97140 MANUAL THERAPY 1/> REGIONS: CPT | Performed by: PHYSICAL THERAPIST

## 2017-07-12 NOTE — PROGRESS NOTES
Aye Manrique  : 1950 [address] at [place] [address] [address] [facility], [place], [address] [place] [address] Road  [phone]   [phone]         OUTPATIENT PHYSICAL THERAPY:Daily Note 2017    ICD-10: Treatment Diagnosis: ( R26.2) difficulty walking, (M62.552) muscle atrophy/weakness, (M25.562) L knee pain, (M25.662) stiffness L knee  Precautions/Allergies: Other medication; Sulfa (sulfonamide antibiotics); and Ultram [tramadol] , ointments  Fall Risk Score: 3 (? 5 = High Risk)  MD Orders: Eval and Treat MEDICAL/REFERRING DIAGNOSIS:  Left Total Knee Arthroplasty on 17  DATE OF ONSET: 17  REFERRING PHYSICIAN: Sergei Fall MD  RETURN PHYSICIAN APPOINTMENT: 17     INITIAL ASSESSMENT:  Ms. Girish Tolliver presents today w/ functional limitations as a result of s/p L TKA on 17 and is  progressing as anticipated w/ moderate L knee pain, swelling, stiffness, weakness and generalized immobility. Her gait remains antalgic with use of a std cane on the R. She requires UE assistance with sit-stand transfers. She lives alone and will benefit from skilled PT to address the following deficits. PROBLEM LIST (Impacting functional limitations):  1. Decreased Strength  2. Decreased ADL/Functional Activities  3. Decreased Transfer Abilities  4. Decreased Ambulation Ability/Technique  5. Decreased Balance  6. Increased Pain  7. Decreased Activity Tolerance  8. Decreased Flexibility/Joint Mobility  9. Edema/Girth INTERVENTIONS PLANNED:  1. Balance Exercise  2. Cryotherapy  3. Electrical Stimulation  4. Gait Training  5. Home Exercise Program (HEP)  6. Manual Therapy  7. Neuromuscular Re-education/Strengthening  8. Range of Motion (ROM)  9. Therapeutic Exercise/Strengthening  10. Ultrasound (US)  11. Vasopneumatic Cold Compression   TREATMENT PLAN:  Effective Dates: 17 TO 17.   Frequency/Duration: 3 times a week for 12 weeks  GOALS: (Goals have been discussed and agreed upon with patient.)  Short-Term Functional Goals: Time Frame: 6 weeks  1. Pt will be compliant and independent with HEP. 2. Pt will improve score on the LEFS to 55/80 to increase pt's overall functional mobility. 3. Pt will improve L knee AROM to 0-120deg to increase pt's ease with transfers and gait. 4. Pt will be able to sit-stand from standard height without use of UE or compensatory weight shifting to rise. 5. Pt will be able to ambulate with a near normal gait pattern for community distances with LRD or no AD. 6. Pt will subjectively report decreased frequency(2-3x/night) of waking due to L knee pain. 7. Pt will be able to ascend 4-6\" steps with good form and control to increase pt's ease with entering her home. Discharge Goals: Time Frame: 12 weeks  1. Pt will improve score on the LEFS to 66/80 to increase pt's overall functional mobility. 2.   Pt will improve FLR from  CK to  CI to increase pt's overall function. 3.   Pt will improve L knee AROM to 0-125deg to increase pt's ease with transfers, gait and balance. 4.   Pt will be able to sit-stand from toilet height w/out use of UE for assistance to rise. 5.   Pt will be able to ascend 6-8\" steps reciprocally and descend 6\" steps reciprocally with use of a rail with good form and control. 6.   Pt will ambulate with a normal gait pattern with no AD to increase pt's overall functional mobility. 7.   Pt will be DC'd from PT to HEP. 8.   Pt will be able to return to gardening, fully caring for her grandchildren and gym activities (cardio and weight machines) with confidence and manageable discomfort. 9.   Pt will only wake 1-2x nightly due to L knee discomfort. Rehabilitation Potential For Stated Goals: Good  Regarding Emmanuel Westfall Jose L's therapy, I certify that the treatment plan above will be carried out by a therapist or under their direction.   Thank you for this referral,  Latesha Moreno, PT     Referring Physician Signature: Anne Marie Espino MD              Date                    The information in this section was collected on 6/30/17 (except where otherwise noted). HISTORY:   History of Present Injury/Illness (Reason for Referral):  Pt reports she's had L knee OA and subsequent pain for several years, however, it worsened ~ 6 months ago, which sent her to Dr. Aline Fuentes, which subsequently resulted in her L TKA. Past Medical History/Comorbidities:   Ms. Pedro Llanos  has a past medical history of Asthma; Diabetes ([facility] [address] (2012); Hypertension; Morbid obesity ([facility] [address] Thromboembolus ([facility] [address] Thyroid disease; and Unspecified adverse effect of anesthesia. She also has no past medical history of Adverse effect of anesthesia; Difficult intubation; Endocarditis; Malignant hyperthermia due to anesthesia; Nausea & vomiting; Nicotine vapor product user; Non-nicotine vapor product user; Pseudocholinesterase deficiency; or Rheumatic fever. Ms. Pedro Llanos  has a past surgical history that includes gyn; neurological procedure unlisted; carpal tunnel release (1984); orthopaedic; appendectomy (1966); heent; and refractive surgery (2001). She states she also had a L RCR in 2013. Social History/Living Environment:     Pt reports she lives alone in a 1 story home with 1 4-6\" step to enter. She states she has a son, daughter-in-law and grandchildren who live close to her and check in frequently. She drove herself to PT today. Prior Level of Function/Work/Activity:  Pt reports despite her L knee pain, she worked part-time at [employer] ~ 16 hours/week, and worked out at [employer] x 1 hour 4 x /week. She denies having to use a cane prior to surgery. Pt states she would like to retun to return to gardening, gym activities and her part-time job upon completion of PT. Current Medications:       Current Outpatient Prescriptions:     oxyCODONE IR (ROXICODONE) 5 mg immediate release tablet, Take 1 Tab by mouth every three (3) hours as needed.  Max Daily Amount: 40 mg. (Patient taking differently: Take 1 Tab by mouth every three (3) hours as needed for Pain.), Disp: 90 Tab, Rfl: 0    ondansetron (ZOFRAN ODT) 8 mg disintegrating tablet, Take 1 Tab by mouth every eight (8) hours as needed. , Disp: 60 Tab, Rfl: 0    acetaminophen (TYLENOL) 500 mg tablet, Take 2 Tabs by mouth every six (6) hours. , Disp: 120 Tab, Rfl: 0    aspirin 81 mg chewable tablet, Take 1 Tab by mouth two (2) times a day., Disp: 60 Tab, Rfl: 0    cyclobenzaprine (FLEXERIL) 10 mg tablet, Take 0.5 Tabs by mouth three (3) times daily. , Disp: 60 Tab, Rfl: 0    gabapentin (NEURONTIN) 100 mg capsule, Take 1 Cap by mouth two (2) times a day., Disp: 60 Cap, Rfl: 0    HYDROmorphone (DILAUDID) 2 mg tablet, Take 1 Tab by mouth every four (4) hours as needed. Max Daily Amount: 12 mg., Disp: 90 Tab, Rfl: 0    senna-docusate (PERICOLACE) 8.6-50 mg per tablet, Take 2 Tabs by mouth daily. , Disp: 120 Tab, Rfl: 0    zolpidem (AMBIEN) 5 mg tablet, Take 1 Tab by mouth nightly as needed for Sleep. Max Daily Amount: 5 mg., Disp: 30 Tab, Rfl: 0    ergocalciferol (ERGOCALCIFEROL) 50,000 unit capsule, Take 1 Cap by mouth every seven (7) days. Indications: VITAMIN D DEFICIENCY (HIGH DOSE THERAPY) (Patient taking differently: Take 50,000 Units by mouth every seven (7) days. Every Sunday  Indications: VITAMIN D DEFICIENCY (HIGH DOSE THERAPY)), Disp: 12 Cap, Rfl: 2    linagliptin-metformin (JENTADUETO XR) 5-1,000 mg TBph, Take 1 Tab by mouth daily. (Patient taking differently: Take 1 Tab by mouth daily. Indications: type 2 diabetes mellitus), Disp: 90 Tab, Rfl: 3    albuterol (PROVENTIL HFA) 90 mcg/actuation inhaler, Take 1 Puff by inhalation every four (4) hours as needed. (Patient taking differently: Take 1 Puff by inhalation every four (4) hours as needed.  Take morning of surgery per anesthesia guidelines if needed and bring to hospital), Disp: 3 Inhaler, Rfl: 1    montelukast (SINGULAIR) 10 mg tablet, TAKE 1 TABLET DAILY, Disp: 90 Tab, Rfl: 3    levothyroxine (SYNTHROID) 75 mcg tablet, TAKE 1 TABLET DAILY BEFORE BREAKFAST, Disp: 90 Tab, Rfl: 2    hydroCHLOROthiazide (HYDRODIURIL) 25 mg tablet, Take 1 Tab by mouth daily. Indications: am, Disp: 90 Tab, Rfl: 3    amLODIPine-benazepril (LOTREL) 5-10 mg per capsule, Take 1 Cap by mouth daily. , Disp: 90 Cap, Rfl: 3   Date Last Reviewed:  6/30/17   EXAMINATION:   Observation/Orthostatic Postural Assessment:    Atrophy: Pt with moderate atrophy noted in L quad and GS complex compared to the R. Incision: Pt's incision is healing nicely with no signs of infection. No open areas. Palpation: Pt minimally warm to touch with increased swelling and tissue density along medial and posterior L knee  Varsha's Sign: Neg      AROM/Strength:  LE AROM/Strength DATE  6/30/17 DATE  7/3/17   Hip Flexion R: WNL  L: WFL R:   L:    Hip Abduction  R: WFL  L: WFL R:   L:    Hip Extension  R: WFL  L: WFL R:   L:    Knee Flexion  R: WNL  L: 110 R:   L: 120   Knee Extension  R: WNL  L: 6 R:   L: 6   Ankle Dorsiflexion  R: WNL  L:WFL R:   L:   Ankle Plantarflexion  R:WNL  L:WNL R:  L:   Flexibility: HS/Quads/GS R:WNL  L:moderate tightness throughout          Functional Mobility:         Gait/Ambulation:  Pt ambulates with a std cane on the R with decreased step length and stance time on the L. She also demonstrates L knee flexion during swing phase and delayed L HS/TO sequencing. Transfers:  Pt uses B UE, as well as, compensatory weight shifting to the R to rise from a std height chair. Stairs:  Pt ascends/descends 6\" stairs with a step-to gait pattern with use of 1 rail. Balance: Pt with good static balance as observed through activities in the gym today. Dynamic: further testing required.      Standing Heel Raises: Able w/ UE     Balance:           Edema/Girth:  2+    Left Right    Initial Most Recent Initial Most Recent   Lower  Extremity  43.0cm    none           Outcome Measure: Tool Used: Lower Extremity Functional Scale (LEFS)  Score:  Initial: 44/80 Most Recent: X/80 (Date: -- )   Interpretation of Score: 20 questions each scored on a 5 point scale with 0 representing \"extreme difficulty or unable to perform\" and 4 representing \"no difficulty\". The lower the score, the greater the functional disability. 80/80 represents no disability. Minimal detectable change is 9 points. Score 80 79-63 62-48 47-32 31-16 15-1 0   Modifier CH CI CJ CK CL CM CN     ? Mobility - Walking and Moving Around:     - CURRENT STATUS: CK - 40%-59% impaired, limited or restricted    - GOAL STATUS: CI - 1%-19% impaired, limited or restricted    - D/C STATUS:  ---------------To be determined---------------    Medical Necessity:   · Patient is expected to demonstrate progress in strength, range of motion, balance, coordination and functional technique to decrease assistance required with gait, sit-stand transfers and stairs. and increase independence with daily activities allowing her to return to gardening, caring for her grandchildren and gym activities. .  · Patient demonstrates good rehab potential due to higher previous functional level. Reason for Services/Other Comments:  · Patient showed mild signs of depression today. She is very social and feel she will benefit from OP PT to improve gait,balance, coordination, strength, ROM and overall mobility to allow her to return to the gym and other social activties to improve her mental health. .          ch  TREATMENT:   (In addition to Assessment/Re-Assessment sessions the following treatments were rendered)  Pre-treatment Symptoms/Complaints:  Pt reports increased L knee pain/soreness, as well as, generalized fatigue after last PT visit. She states because her activity level has increased by babysitting her grandchildren daily, she feels she needs to drop to PT 2x/week. She states doing exercises in the pool yesterday with the kids.  Pt reports compliance with taking Tylenol and Advil prior to PT today. Pt reports she sees MD on Friday (7/14/17)  Pain: Initial:2     Post Session:  3     MANUAL THERAPY: (10min): STM/MFR to L knee to aide with decreasing tissue density/tightness, especially posteriorly, and pain to improve AROM and gait quality. Kinesiotaping \"I\" strip along incision for scar tissue adhesions and mobility, as well as, \"lantern\"  X 2 for lymphatic drainage. THERAPEUTIC EXERCISE: (45 minutes):  Exercises per grid below to improve mobility and strength. Required moderate visual, verbal and tactile cues to promote proper body alignment, promote proper body posture and promote proper body breathing techniques.   Progressed repetitions as indicated.      Date:  6/30/17 Date:  7/3/17 Date:  7/5/17 Date:  7/7/17 Date:  7/10/17 Date:  7/12/17   Activity/Exercise Parameters Parameters Parameters Parameters     Nu step  L2, 6min L3, 9min L4, 8min L4, 9min L4, 6min   QS x10 W/ [place], 5min       Heel slides w/ ball x10 x10 x10      SAQ x10 W/ [place], 5min       SLR x10 W/ [place] and A, 5min       Sit-stand  x10 20x w/equal weight B using mirror 20x 20x 20x   Seated knee Ext     RTB, 20x     Seated HSC    RTB, 20x     Bridging w/ ball x10 x10       clams x10        HS/GS stretch w/ strap 3 x 20sec ea 3 x 20sec each 3 x 20sec B on board  3 x 20sec on board 3 x 20sec on board   Education Pat mobs/scar massage Use of ice and sitting /sleeping w/ knee into extension Using Tylenol prior to PT and ice afterwards; patellar mobs and scar massage Eating/drinking fluids/Tylenol prior to PT  Walking/marching/stretcing and bicycling in the pool   Stairs-ascend/descend reciprocally   4x  4x 6x   3\" step-ups/downs     20x 20x   Ladder  ( slow marching, side-stepping, diagonals)   2L each 4L marching 3L each dir Marching in clinic ~ 300\"   Stdg CKC TKE   BTB, 20x  BTB, 20x BTB, 20x   Gait in clinic without cane   300' 300\" No cane 300'      L knee AROM: 5-123deg (7/12/17)    Treatment/Session Assessment: Pt's L knee AROM continues to improve as noted above. She still requires verbal and visual cues to perform activities correctly and avoid use of momentum and compensatory mechanisms. She still lacks full L knee extension due to posterior knee tightness, as well as, residual quad weakness. She continues to fatigue quickly with stair work. She demonstrated less vaulting off R LE to ascend with L indicating improving strength. 3\" and 6\" stair descent remains challenging due to decreased L quad eccentric control. · Response to Treatment: Pt did well with above exercises today with only a min increase in soreness. She gets frustrated with cueing to slow activities. · Compliance with Program/Exercises: Pt reports compliance with HEP 1x since her visit on Monday. · Recommendations/Intent for next treatment session: Next visit will focus on advancements to more challenging activities, reduction in assistance provided. Continue with balance activities   and functional strength training; continue to focus on quad strengthening and eccentric control.      Future Appointments  Date Time Provider Мария Rosales   7/14/2017 9:00 AM La Nena Owusu, PT [facility]   7/17/2017 9:00 AM La Nena Owusu, PT [facility]   7/19/2017 9:00 AM La Nenarenate Owusu, PT [facility]   7/21/2017 9:00 AM La Nena Francoise, PT [facility]   7/24/2017 9:00 AM La Nena Francoise, PT [facility]   7/26/2017 9:00 AM La Nenarenate Owusu, PT [facility]   7/28/2017 9:00 AM La Nenarenate Owusu, PT [facility]   7/31/2017 9:00 AM La Nena Owusu, PT [facility]   8/4/2017 9:30 AM Sridhar Ge MD [facility]    Total Treatment Duration:  PT Patient Time In/Time Out  Time In: 0900  Time Out: [id number] Lindsey, PT

## 2017-07-14 ENCOUNTER — APPOINTMENT (OUTPATIENT)
Dept: PHYSICAL THERAPY | Age: 67
End: 2017-07-14
Payer: MEDICARE

## 2017-07-14 ENCOUNTER — HOSPITAL ENCOUNTER (OUTPATIENT)
Dept: PHYSICAL THERAPY | Age: 67
Discharge: HOME | End: 2017-07-14
Payer: MEDICARE

## 2017-07-17 ENCOUNTER — HOSPITAL ENCOUNTER (OUTPATIENT)
Dept: PHYSICAL THERAPY | Age: 67
Discharge: HOME | End: 2017-07-17
Payer: MEDICARE

## 2017-07-17 PROCEDURE — 97140 MANUAL THERAPY 1/> REGIONS: CPT | Performed by: PHYSICAL THERAPIST

## 2017-07-17 PROCEDURE — 97110 THERAPEUTIC EXERCISES: CPT | Performed by: PHYSICAL THERAPIST

## 2017-07-17 NOTE — PROGRESS NOTES
Sergio Greenwood  : 1950 [id number] at [address] [place] [address], [facility], [address] [place] [address]  [phone]   [phone]         OUTPATIENT PHYSICAL THERAPY:Daily Note 2017    ICD-10: Treatment Diagnosis: ( R26.2) difficulty walking, (M62.552) muscle atrophy/weakness, (M25.562) L knee pain, (M25.662) stiffness L knee  Precautions/Allergies: Other medication; Sulfa (sulfonamide antibiotics); and Ultram [tramadol] , ointments  Fall Risk Score: 3 (? 5 = High Risk)  MD Orders: Eval and Treat MEDICAL/REFERRING DIAGNOSIS:  Left Total Knee Arthroplasty on 17  DATE OF ONSET: 17  REFERRING PHYSICIAN: Kathie Campos MD  RETURN PHYSICIAN APPOINTMENT: 17     INITIAL ASSESSMENT:  Ms. Anny Osman presents today w/ functional limitations as a result of s/p L TKA on 17 and is  progressing as anticipated w/ moderate L knee pain, swelling, stiffness, weakness and generalized immobility. Her gait remains antalgic with use of a std cane on the R. She requires UE assistance with sit-stand transfers. She lives alone and will benefit from skilled PT to address the following deficits. PROBLEM LIST (Impacting functional limitations):  1. Decreased Strength  2. Decreased ADL/Functional Activities  3. Decreased Transfer Abilities  4. Decreased Ambulation Ability/Technique  5. Decreased Balance  6. Increased Pain  7. Decreased Activity Tolerance  8. Decreased Flexibility/Joint Mobility  9. Edema/Girth INTERVENTIONS PLANNED:  1. Balance Exercise  2. Cryotherapy  3. Electrical Stimulation  4. Gait Training  5. Home Exercise Program (HEP)  6. Manual Therapy  7. Neuromuscular Re-education/Strengthening  8. Range of Motion (ROM)  9. Therapeutic Exercise/Strengthening  10. Ultrasound (US)  11. Vasopneumatic Cold Compression   TREATMENT PLAN:  Effective Dates: 17 TO 17.   Frequency/Duration: 3 times a week for 12 weeks  GOALS: (Goals have been discussed and agreed upon with patient.)  Short-Term Functional Goals: Time Frame: 6 weeks  1. Pt will be compliant and independent with HEP. 2. Pt will improve score on the LEFS to 55/80 to increase pt's overall functional mobility. 3. Pt will improve L knee AROM to 0-120deg to increase pt's ease with transfers and gait. 4. Pt will be able to sit-stand from standard height without use of UE or compensatory weight shifting to rise. 5. Pt will be able to ambulate with a near normal gait pattern for community distances with LRD or no AD. 6. Pt will subjectively report decreased frequency(2-3x/night) of waking due to L knee pain. 7. Pt will be able to ascend 4-6\" steps with good form and control to increase pt's ease with entering her home. Discharge Goals: Time Frame: 12 weeks  1. Pt will improve score on the LEFS to 66/80 to increase pt's overall functional mobility. 2.   Pt will improve FLR from  CK to  CI to increase pt's overall function. 3.   Pt will improve L knee AROM to 0-125deg to increase pt's ease with transfers, gait and balance. 4.   Pt will be able to sit-stand from toilet height w/out use of UE for assistance to rise. 5.   Pt will be able to ascend 6-8\" steps reciprocally and descend 6\" steps reciprocally with use of a rail with good form and control. 6.   Pt will ambulate with a normal gait pattern with no AD to increase pt's overall functional mobility. 7.   Pt will be DC'd from PT to HEP. 8.   Pt will be able to return to gardening, fully caring for her grandchildren and gym activities (cardio and weight machines) with confidence and manageable discomfort. 9.   Pt will only wake 1-2x nightly due to L knee discomfort. Rehabilitation Potential For Stated Goals: Good  Regarding Doug Domingo's therapy, I certify that the treatment plan above will be carried out by a therapist or under their direction.   Thank you for this referral,  Royce Araujo, PT     Referring Physician Signature: Yamilka Vora MD              Date                    The information in this section was collected on 6/30/17 (except where otherwise noted). HISTORY:   History of Present Injury/Illness (Reason for Referral):  Pt reports she's had L knee OA and subsequent pain for several years, however, it worsened ~ 6 months ago, which sent her to Dr. Jeffrey Fatima, which subsequently resulted in her L TKA. Past Medical History/Comorbidities:   Ms. Ana Maria Rivers  has a past medical history of Asthma; Diabetes ([facility] (2012); Hypertension; Morbid obesity ([facility] Thromboembolus ([facility] Thyroid disease; and Unspecified adverse effect of anesthesia. She also has no past medical history of Adverse effect of anesthesia; Difficult intubation; Endocarditis; Malignant hyperthermia due to anesthesia; Nausea & vomiting; Nicotine vapor product user; Non-nicotine vapor product user; Pseudocholinesterase deficiency; or Rheumatic fever. Ms. Ana Maria Rivers  has a past surgical history that includes gyn; neurological procedure unlisted; carpal tunnel release (1984); orthopaedic; appendectomy (1966); heent; and refractive surgery (2001). She states she also had a L RCR in 2013. Social History/Living Environment:     Pt reports she lives alone in a 1 story home with 1 4-6\" step to enter. She states she has a son, daughter-in-law and grandchildren who live close to her and check in frequently. She drove herself to PT today. Prior Level of Function/Work/Activity:  Pt reports despite her L knee pain, she worked part-time at [employer] ~ 16 hours/week, and worked out at [employer] x 1 hour 4 x /week. She denies having to use a cane prior to surgery. Pt states she would like to retun to return to gardening, gym activities and her part-time job upon completion of PT. Current Medications:       Current Outpatient Prescriptions:     oxyCODONE IR (ROXICODONE) 5 mg immediate release tablet, Take 1 Tab by mouth every three (3) hours as needed.  Max Daily Amount: 40 mg. (Patient taking differently: Take 1 Tab by mouth every three (3) hours as needed for Pain.), Disp: 90 Tab, Rfl: 0    ondansetron (ZOFRAN ODT) 8 mg disintegrating tablet, Take 1 Tab by mouth every eight (8) hours as needed. , Disp: 60 Tab, Rfl: 0    acetaminophen (TYLENOL) 500 mg tablet, Take 2 Tabs by mouth every six (6) hours. , Disp: 120 Tab, Rfl: 0    aspirin 81 mg chewable tablet, Take 1 Tab by mouth two (2) times a day., Disp: 60 Tab, Rfl: 0    cyclobenzaprine (FLEXERIL) 10 mg tablet, Take 0.5 Tabs by mouth three (3) times daily. , Disp: 60 Tab, Rfl: 0    gabapentin (NEURONTIN) 100 mg capsule, Take 1 Cap by mouth two (2) times a day., Disp: 60 Cap, Rfl: 0    HYDROmorphone (DILAUDID) 2 mg tablet, Take 1 Tab by mouth every four (4) hours as needed. Max Daily Amount: 12 mg., Disp: 90 Tab, Rfl: 0    senna-docusate (PERICOLACE) 8.6-50 mg per tablet, Take 2 Tabs by mouth daily. , Disp: 120 Tab, Rfl: 0    zolpidem (AMBIEN) 5 mg tablet, Take 1 Tab by mouth nightly as needed for Sleep. Max Daily Amount: 5 mg., Disp: 30 Tab, Rfl: 0    ergocalciferol (ERGOCALCIFEROL) 50,000 unit capsule, Take 1 Cap by mouth every seven (7) days. Indications: VITAMIN D DEFICIENCY (HIGH DOSE THERAPY) (Patient taking differently: Take 50,000 Units by mouth every seven (7) days. Every Sunday  Indications: VITAMIN D DEFICIENCY (HIGH DOSE THERAPY)), Disp: 12 Cap, Rfl: 2    linagliptin-metformin (JENTADUETO XR) 5-1,000 mg TBph, Take 1 Tab by mouth daily. (Patient taking differently: Take 1 Tab by mouth daily. Indications: type 2 diabetes mellitus), Disp: 90 Tab, Rfl: 3    albuterol (PROVENTIL HFA) 90 mcg/actuation inhaler, Take 1 Puff by inhalation every four (4) hours as needed. (Patient taking differently: Take 1 Puff by inhalation every four (4) hours as needed.  Take morning of surgery per anesthesia guidelines if needed and bring to hospital), Disp: 3 Inhaler, Rfl: 1    montelukast (SINGULAIR) 10 mg tablet, TAKE 1 TABLET DAILY, Disp: 90 Tab, Rfl: 3    levothyroxine (SYNTHROID) 75 mcg tablet, TAKE 1 TABLET DAILY BEFORE BREAKFAST, Disp: 90 Tab, Rfl: 2    hydroCHLOROthiazide (HYDRODIURIL) 25 mg tablet, Take 1 Tab by mouth daily. Indications: am, Disp: 90 Tab, Rfl: 3    amLODIPine-benazepril (LOTREL) 5-10 mg per capsule, Take 1 Cap by mouth daily. , Disp: 90 Cap, Rfl: 3   Date Last Reviewed:  6/30/17   EXAMINATION:   Observation/Orthostatic Postural Assessment:    Atrophy: Pt with moderate atrophy noted in L quad and GS complex compared to the R. Incision: Pt's incision is healing nicely with no signs of infection. No open areas. Palpation: Pt minimally warm to touch with increased swelling and tissue density along medial and posterior L knee  Varsha's Sign: Neg      AROM/Strength:  LE AROM/Strength DATE  6/30/17 DATE  7/3/17   Hip Flexion R: WNL  L: WFL R:   L:    Hip Abduction  R: WFL  L: WFL R:   L:    Hip Extension  R: WFL  L: WFL R:   L:    Knee Flexion  R: WNL  L: 110 R:   L: 120   Knee Extension  R: WNL  L: 6 R:   L: 6   Ankle Dorsiflexion  R: WNL  L:WFL R:   L:   Ankle Plantarflexion  R:WNL  L:WNL R:  L:   Flexibility: HS/Quads/GS R:WNL  L:moderate tightness throughout          Functional Mobility:         Gait/Ambulation:  Pt ambulates with a std cane on the R with decreased step length and stance time on the L. She also demonstrates L knee flexion during swing phase and delayed L HS/TO sequencing. Transfers:  Pt uses B UE, as well as, compensatory weight shifting to the R to rise from a std height chair. Stairs:  Pt ascends/descends 6\" stairs with a step-to gait pattern with use of 1 rail. Balance: Pt with good static balance as observed through activities in the gym today. Dynamic: further testing required.      Standing Heel Raises: Able w/ UE     Balance:           Edema/Girth:  2+    Left Right    Initial Most Recent Initial Most Recent   Lower  Extremity  43.0cm    none           Outcome Measure: Tool Used: Lower Extremity Functional Scale (LEFS)  Score:  Initial: 44/80 Most Recent: X/80 (Date: -- )   Interpretation of Score: 20 questions each scored on a 5 point scale with 0 representing \"extreme difficulty or unable to perform\" and 4 representing \"no difficulty\". The lower the score, the greater the functional disability. 80/80 represents no disability. Minimal detectable change is 9 points. Score 80 79-63 62-48 47-32 31-16 15-1 0   Modifier CH CI CJ CK CL CM CN     ? Mobility - Walking and Moving Around:     - CURRENT STATUS: CK - 40%-59% impaired, limited or restricted    - GOAL STATUS: CI - 1%-19% impaired, limited or restricted    - D/C STATUS:  ---------------To be determined---------------    Medical Necessity:   · Patient is expected to demonstrate progress in strength, range of motion, balance, coordination and functional technique to decrease assistance required with gait, sit-stand transfers and stairs. and increase independence with daily activities allowing her to return to gardening, caring for her grandchildren and gym activities. .  · Patient demonstrates good rehab potential due to higher previous functional level. Reason for Services/Other Comments:  · Patient showed mild signs of depression today. She is very social and feel she will benefit from OP PT to improve gait,balance, coordination, strength, ROM and overall mobility to allow her to return to the gym and other social activties to improve her mental health. .            TREATMENT:   (In addition to Assessment/Re-Assessment sessions the following treatments were rendered)  Pre-treatment Symptoms/Complaints: Pt reports she was wrong about MD appointment on 7/14/17, it's actually this Friday, 7/21/17. She reports she decided to not call and get scheduled again for PT due to having a sick grandchild.  She reports overall, her L knee has been doing well, except c/o increased pain and stiffness yesterday due to the thunderstorms. She reports having to take Tylenol and Oxycodone during the night. Visit #7  Pain: Initial:2     Post Session:  2     MANUAL THERAPY: (15min): STM/MFR to L knee to aide with decreasing tissue density/tightness, especially posteriorly and at distal quad/proximal incision, to improve AROM and gait fluidity while decreasing pain. THERAPEUTIC EXERCISE: (45 minutes):  Exercises per grid below to improve mobility and strength. Required moderate visual, verbal and tactile cues to promote proper body alignment, promote proper body posture and promote proper body breathing techniques. Progressed repetitions as indicated.      Date:  7/5/17 Date:  7/7/17 Date:  7/10/17 Date:  7/12/17 Date:  7/17/17    Activity/Exercise Parameters Parameters Parameters Parameters Parameters    Nu step L3, 9min L4, 8min L4, 9min L4, 6min L3, 11min    QS         Heel slides w/ ball x10        SAQ         SLR         Sit-stand 20x w/equal weight B using mirror 20x 20x 20x Low mat, 20x    Seated knee Ext   RTB, 20x   Reviewed as HEP, 10x    Seated HSC  RTB, 20x   Reviewed as HEP    Bridging w/ ball         clams         HS/GS stretch w/ strap 3 x 20sec B on board  3 x 20sec on board 3 x 20sec on board 3 x 20sec on board    Education Using Tylenol prior to PT and ice afterwards; patellar mobs and scar massage Eating/drinking fluids/Tylenol prior to PT  Walking/marching/stretcing and bicycling in the pool HEP 2x daily    Stairs-ascend/descend reciprocally 4x  4x 6x 8x    3\" step-ups/downs   20x 20x 20x, working on slow descent    Ladder  ( slow marching, side-stepping, diagonals) 2L each 4L marching 3L each dir Marching in clinic ~ 300\" 4L each    Stdg SLS     Blue pad, 5 x 8 sec    Stdg CKC TKE BTB, 20x  BTB, 20x BTB, 20x BTB    Gait in clinic without cane 300' 300\" No cane 300'        L knee AROM: 2-122deg (7/17/17)    Treatment/Session Assessment: Pt with improved L quad contraction and thus L knee extension AROM today.  Her quality and fluidity with ladder activities improved after several repetitions, indicating improving muscle memory and proprioception. She still requires vc to avoid looking at her feet and avoid use of momentum. She was challenged by SLS on compromised surface due to residual balance deficits. Her fluidity with 3\" and 6\" step-ups/overs is improving due to improving strength. She remains frustrated that she still has some swelling and discomfort, despite education on what her body went through during surgery, and the full length of time her body will require to fully heal.   · Response to Treatment: Pt did well with above exercises today with no increase in soreness. She gets frustrated with cueing to slow activities. · Compliance with Program/Exercises: Pt reports compliance with HEP 3x since last week, but while she's in the pool with her grandchildren. · Recommendations/Intent for next treatment session: Next visit will focus on advancements to more challenging activities, reduction in assistance provided. Increase step-up height, as well as, continue working on control with stair descent.      Future Appointments  Date Time Provider Мария Rosales   7/19/2017 9:00 AM Tuyet Antonio, PT [facility]   7/21/2017 9:00 AM Tuyetshahid Antonio PT [facility]   7/24/2017 9:00 AM Tuyet Marisela, PT [facility]   7/26/2017 9:00 AM Tuyet Marisela, PT [facility]   7/28/2017 9:00 AM Tuyetshahid Antonio PT [facility]   7/31/2017 9:00 AM Tuyet Marisela, PT [facility]   8/4/2017 9:30 AM Andriy Morrison MD [facility]   [facility]    Total Treatment Duration:  PT Patient Time In/Time Out  Time In: 0900  Time Out: 1005Heather Jaylan Moreau, PT

## 2017-07-19 ENCOUNTER — APPOINTMENT (OUTPATIENT)
Dept: PHYSICAL THERAPY | Age: 67
End: 2017-07-19
Payer: MEDICARE

## 2017-07-21 ENCOUNTER — HOSPITAL ENCOUNTER (OUTPATIENT)
Dept: PHYSICAL THERAPY | Age: 67
Discharge: HOME | End: 2017-07-21
Payer: MEDICARE

## 2017-07-21 PROCEDURE — 97110 THERAPEUTIC EXERCISES: CPT | Performed by: PHYSICAL THERAPIST

## 2017-07-21 NOTE — PROGRESS NOTES
Maximo Britt  : [id number] at [place] [address] [address] [place] [address], [facility], [address] [place] [address]  [phone]   [phone]         OUTPATIENT PHYSICAL THERAPY:Daily Note 2017    ICD-10: Treatment Diagnosis: ( R26.2) difficulty walking, (M62.552) muscle atrophy/weakness, (M25.562) L knee pain, (M25.662) stiffness L knee  Precautions/Allergies: Other medication; Sulfa (sulfonamide antibiotics); and Ultram [tramadol] , ointments  Fall Risk Score: 3 (? 5 = High Risk)  MD Orders: Eval and Treat MEDICAL/REFERRING DIAGNOSIS:  Left Total Knee Arthroplasty on 17  DATE OF ONSET: 17  REFERRING PHYSICIAN: Joshua Hanson MD  RETURN PHYSICIAN APPOINTMENT: 17     INITIAL ASSESSMENT:  Ms. Melo Carranza presents today w/ functional limitations as a result of s/p L TKA on 17 and is  progressing as anticipated w/ moderate L knee pain, swelling, stiffness, weakness and generalized immobility. Her gait remains antalgic with use of a std cane on the R. She requires UE assistance with sit-stand transfers. She lives alone and will benefit from skilled PT to address the following deficits. PROBLEM LIST (Impacting functional limitations):  1. Decreased Strength  2. Decreased ADL/Functional Activities  3. Decreased Transfer Abilities  4. Decreased Ambulation Ability/Technique  5. Decreased Balance  6. Increased Pain  7. Decreased Activity Tolerance  8. Decreased Flexibility/Joint Mobility  9. Edema/Girth INTERVENTIONS PLANNED:  1. Balance Exercise  2. Cryotherapy  3. Electrical Stimulation  4. Gait Training  5. Home Exercise Program (HEP)  6. Manual Therapy  7. Neuromuscular Re-education/Strengthening  8. Range of Motion (ROM)  9. Therapeutic Exercise/Strengthening  10. Ultrasound (US)  11. Vasopneumatic Cold Compression   TREATMENT PLAN:  Effective Dates: 17 TO 17.   Frequency/Duration: 3 times a week for 12 weeks  GOALS: (Goals have been discussed and agreed upon with patient.)  Short-Term Functional Goals: Time Frame: 6 weeks  1. Pt will be compliant and independent with HEP. 2. Pt will improve score on the LEFS to 55/80 to increase pt's overall functional mobility. 3. Pt will improve L knee AROM to 0-120deg to increase pt's ease with transfers and gait. 4. Pt will be able to sit-stand from standard height without use of UE or compensatory weight shifting to rise. 5. Pt will be able to ambulate with a near normal gait pattern for community distances with LRD or no AD. 6. Pt will subjectively report decreased frequency(2-3x/night) of waking due to L knee pain. 7. Pt will be able to ascend 4-6\" steps with good form and control to increase pt's ease with entering her home. Discharge Goals: Time Frame: 12 weeks  1. Pt will improve score on the LEFS to 66/80 to increase pt's overall functional mobility. 2.   Pt will improve FLR from  CK to  CI to increase pt's overall function. 3.   Pt will improve L knee AROM to 0-125deg to increase pt's ease with transfers, gait and balance. 4.   Pt will be able to sit-stand from toilet height w/out use of UE for assistance to rise. 5.   Pt will be able to ascend 6-8\" steps reciprocally and descend 6\" steps reciprocally with use of a rail with good form and control. 6.   Pt will ambulate with a normal gait pattern with no AD to increase pt's overall functional mobility. 7.   Pt will be DC'd from PT to HEP. 8.   Pt will be able to return to gardening, fully caring for her grandchildren and gym activities (cardio and weight machines) with confidence and manageable discomfort. 9.   Pt will only wake 1-2x nightly due to L knee discomfort. Rehabilitation Potential For Stated Goals: Good  Regarding Landon Domingo's therapy, I certify that the treatment plan above will be carried out by a therapist or under their direction.   Thank you for this referral,  Blas Barnes, PT     Referring Physician Signature: Carmen Collins MD              Date                    The information in this section was collected on 6/30/17 (except where otherwise noted). HISTORY:   History of Present Injury/Illness (Reason for Referral):  Pt reports she's had L knee OA and subsequent pain for several years, however, it worsened ~ 6 months ago, which sent her to Dr. Tobias Carlos, which subsequently resulted in her L TKA. Past Medical History/Comorbidities:   Ms. Malvin Patten  has a past medical history of Asthma; Diabetes ([facility] [address] (2012); Hypertension; Morbid obesity ([facility] [address] Thromboembolus ([facility] [address] Thyroid disease; and Unspecified adverse effect of anesthesia. She also has no past medical history of Adverse effect of anesthesia; Difficult intubation; Endocarditis; Malignant hyperthermia due to anesthesia; Nausea & vomiting; Nicotine vapor product user; Non-nicotine vapor product user; Pseudocholinesterase deficiency; or Rheumatic fever. Ms. Malvin Patten  has a past surgical history that includes gyn; neurological procedure unlisted; carpal tunnel release (1984); orthopaedic; appendectomy (1966); heent; and refractive surgery (2001). She states she also had a L RCR in 2013. Social History/Living Environment:     Pt reports she lives alone in a 1 story home with 1 4-6\" step to enter. She states she has a son, daughter-in-law and grandchildren who live close to her and check in frequently. She drove herself to PT today. Prior Level of Function/Work/Activity:  Pt reports despite her L knee pain, she worked part-time at [employer] ~ 16 hours/week, and worked out at [employer] x 1 hour 4 x /week. She denies having to use a cane prior to surgery. Pt states she would like to retun to return to gardening, gym activities and her part-time job upon completion of PT. Current Medications:       Current Outpatient Prescriptions:     oxyCODONE IR (ROXICODONE) 5 mg immediate release tablet, Take 1 Tab by mouth every three (3) hours as needed.  Max Daily Amount: 40 mg. (Patient taking differently: Take 1 Tab by mouth every three (3) hours as needed for Pain.), Disp: 90 Tab, Rfl: 0    ondansetron (ZOFRAN ODT) 8 mg disintegrating tablet, Take 1 Tab by mouth every eight (8) hours as needed. , Disp: 60 Tab, Rfl: 0    acetaminophen (TYLENOL) 500 mg tablet, Take 2 Tabs by mouth every six (6) hours. , Disp: 120 Tab, Rfl: 0    aspirin 81 mg chewable tablet, Take 1 Tab by mouth two (2) times a day., Disp: 60 Tab, Rfl: 0    cyclobenzaprine (FLEXERIL) 10 mg tablet, Take 0.5 Tabs by mouth three (3) times daily. , Disp: 60 Tab, Rfl: 0    gabapentin (NEURONTIN) 100 mg capsule, Take 1 Cap by mouth two (2) times a day., Disp: 60 Cap, Rfl: 0    HYDROmorphone (DILAUDID) 2 mg tablet, Take 1 Tab by mouth every four (4) hours as needed. Max Daily Amount: 12 mg., Disp: 90 Tab, Rfl: 0    senna-docusate (PERICOLACE) 8.6-50 mg per tablet, Take 2 Tabs by mouth daily. , Disp: 120 Tab, Rfl: 0    zolpidem (AMBIEN) 5 mg tablet, Take 1 Tab by mouth nightly as needed for Sleep. Max Daily Amount: 5 mg., Disp: 30 Tab, Rfl: 0    ergocalciferol (ERGOCALCIFEROL) 50,000 unit capsule, Take 1 Cap by mouth every seven (7) days. Indications: VITAMIN D DEFICIENCY (HIGH DOSE THERAPY) (Patient taking differently: Take 50,000 Units by mouth every seven (7) days. Every Sunday  Indications: VITAMIN D DEFICIENCY (HIGH DOSE THERAPY)), Disp: 12 Cap, Rfl: 2    linagliptin-metformin (JENTADUETO XR) 5-1,000 mg TBph, Take 1 Tab by mouth daily. (Patient taking differently: Take 1 Tab by mouth daily. Indications: type 2 diabetes mellitus), Disp: 90 Tab, Rfl: 3    albuterol (PROVENTIL HFA) 90 mcg/actuation inhaler, Take 1 Puff by inhalation every four (4) hours as needed. (Patient taking differently: Take 1 Puff by inhalation every four (4) hours as needed.  Take morning of surgery per anesthesia guidelines if needed and bring to hospital), Disp: 3 Inhaler, Rfl: 1    montelukast (SINGULAIR) 10 mg tablet, TAKE 1 TABLET DAILY, Disp: 90 Tab, Rfl: 3    levothyroxine (SYNTHROID) 75 mcg tablet, TAKE 1 TABLET DAILY BEFORE BREAKFAST, Disp: 90 Tab, Rfl: 2    hydroCHLOROthiazide (HYDRODIURIL) 25 mg tablet, Take 1 Tab by mouth daily. Indications: am, Disp: 90 Tab, Rfl: 3    amLODIPine-benazepril (LOTREL) 5-10 mg per capsule, Take 1 Cap by mouth daily. , Disp: 90 Cap, Rfl: 3   Date Last Reviewed:  6/30/17   EXAMINATION:   Observation/Orthostatic Postural Assessment:    Atrophy: Pt with moderate atrophy noted in L quad and GS complex compared to the R. Incision: Pt's incision is healing nicely with no signs of infection. No open areas. Palpation: Pt minimally warm to touch with increased swelling and tissue density along medial and posterior L knee  Varsha's Sign: Neg      AROM/Strength:  LE AROM/Strength DATE  6/30/17 DATE  7/3/17   Hip Flexion R: WNL  L: WFL R:   L:    Hip Abduction  R: WFL  L: WFL R:   L:    Hip Extension  R: WFL  L: WFL R:   L:    Knee Flexion  R: WNL  L: 110 R:   L: 120   Knee Extension  R: WNL  L: 6 R:   L: 6   Ankle Dorsiflexion  R: WNL  L:WFL R:   L:   Ankle Plantarflexion  R:WNL  L:WNL R:  L:   Flexibility: HS/Quads/GS R:WNL  L:moderate tightness throughout          Functional Mobility:         Gait/Ambulation:  Pt ambulates with a std cane on the R with decreased step length and stance time on the L. She also demonstrates L knee flexion during swing phase and delayed L HS/TO sequencing. Transfers:  Pt uses B UE, as well as, compensatory weight shifting to the R to rise from a std height chair. Stairs:  Pt ascends/descends 6\" stairs with a step-to gait pattern with use of 1 rail. Balance: Pt with good static balance as observed through activities in the gym today. Dynamic: further testing required.      Standing Heel Raises: Able w/ UE     Balance:           Edema/Girth:  2+    Left Right    Initial Most Recent Initial Most Recent   Lower  Extremity  43.0cm    none           Outcome Measure: Tool Used: Lower Extremity Functional Scale (LEFS)  Score:  Initial: 44/80 Most Recent: X/80 (Date: -- )   Interpretation of Score: 20 questions each scored on a 5 point scale with 0 representing \"extreme difficulty or unable to perform\" and 4 representing \"no difficulty\". The lower the score, the greater the functional disability. 80/80 represents no disability. Minimal detectable change is 9 points. Score 80 79-63 62-48 47-32 31-16 15-1 0   Modifier CH CI CJ CK CL CM CN     ? Mobility - Walking and Moving Around:     - CURRENT STATUS: CK - 40%-59% impaired, limited or restricted    - GOAL STATUS: CI - 1%-19% impaired, limited or restricted    - D/C STATUS:  ---------------To be determined---------------    Medical Necessity:   · Patient is expected to demonstrate progress in strength, range of motion, balance, coordination and functional technique to decrease assistance required with gait, sit-stand transfers and stairs. and increase independence with daily activities allowing her to return to gardening, caring for her grandchildren and gym activities. .  · Patient demonstrates good rehab potential due to higher previous functional level. Reason for Services/Other Comments:  · Patient showed mild signs of depression today. She is very social and feel she will benefit from OP PT to improve gait,balance, coordination, strength, ROM and overall mobility to allow her to return to the gym and other social activties to improve her mental health. .            TREATMENT:     Pre-treatment Symptoms/Complaints:  Pt reports increased L knee stiffness and swelling today due to being up on it a lot due to having out of town company staying with her. She denies compliance with HEP. Pt reports she sees MD later today.   Visit #8  Pain: Initial:4     Post Session:  2     MANUAL THERAPY: (0min): STM/MFR to L knee to aide with decreasing tissue density/tightness, especially posteriorly and at distal quad/proximal incision, to improve AROM and gait fluidity while decreasing pain. ---------------------------------------------------------------Omitted Today    THERAPEUTIC EXERCISE: (55 minutes):  Exercises per grid below to improve mobility and strength. Required moderate visual, verbal and tactile cues to promote proper body alignment, promote proper body posture and promote proper body breathing techniques. Progressed repetitions as indicated.      Date:  7/7/17 Date:  7/10/17 Date:  7/12/17 Date:  7/17/17 Date:   7/21/17   Activity/Exercise Parameters Parameters Parameters Parameters Parameters   Nu step L4, 8min L4, 9min L4, 6min L3, 11min L4, 10min   QS        Heel slides w/ ball        SAQ        SLR        Sit-stand 20x 20x 20x Low mat, 20x 20x   Seated knee Ext  RTB, 20x   Reviewed as HEP, 10x    Seated HSC RTB, 20x   Reviewed as HEP    Bridging w/ ball        clams        HS/GS stretch w/ strap  3 x 20sec on board 3 x 20sec on board 3 x 20sec on board 3 x 20sec   Education Eating/drinking fluids/Tylenol prior to PT  Walking/marching/stretcing and bicycling in the pool HEP 2x daily    Stairs-ascend/descend reciprocally  4x 6x 8x 5x   8\" step-ups     20x   3\" step-ups/downs  20x 20x 20x, working on slow descent 20x   Ladder  ( slow marching, side-stepping, diagonals) 4L marching 3L each dir Marching in clinic ~ 300\" 4L each    Stdg SLS    Blue pad, 5 x 8 sec Blue pad, 10 x 5 sec   Resisted side-stepping     [place], 4L   Stdg CKC TKE  BTB, 20x BTB, 20x BTB    Gait in clinic without cane 300\" No cane 300'        L knee AROM: 2-122deg (7/21/17)    Treatment/Session Assessment: No change in L knee AROM today from Monday. Balance activities requiring SLS and on compromised surfaces remain challenging. Pt requires vc to focus on activity and avoid use of momentum. · Response to Treatment: Pt with improved form and control with 3\" step-overs w/out UE A.  She was challenged by resisted side-stepping due to hip abductor weakness, therefore I added it to her HEP. · Compliance with Program/Exercises: Pt reports noncompliance with HEP due to having out of town company. · Recommendations/Intent for next treatment session: Continue to work on fluidity and control with ascending/descending 8\" stairs and balance activities.      Future Appointments  Date Time Provider Мария Rosales   7/24/2017 9:00 AM Orpha Failing, PT [facility]   7/26/2017 9:00 AM Orpha Failing, PT [facility]   7/28/2017 9:00 AM [facility]   7/31/2017 9:00 [facility]   8/4/2017 9:30 AM Lola Ordoñez MD [facility]       Total Treatment Duration:  PT Patient Time In/Time Out  Time In: 0905  Time Out: [facility], PT

## 2017-07-24 ENCOUNTER — HOSPITAL ENCOUNTER (OUTPATIENT)
Dept: PHYSICAL THERAPY | Age: 67
Discharge: HOME | End: 2017-07-24
Payer: MEDICARE

## 2017-07-24 PROCEDURE — 97110 THERAPEUTIC EXERCISES: CPT | Performed by: PHYSICAL THERAPIST

## 2017-07-24 NOTE — PROGRESS NOTES
Denny Ag  : 1950 [address] at [place] [address] [address] [place] [address], [facility], [address] [place] [address]  [phone]   [phone]         OUTPATIENT PHYSICAL THERAPY:Daily Note 2017    ICD-10: Treatment Diagnosis: ( R26.2) difficulty walking, (M62.552) muscle atrophy/weakness, (M25.562) L knee pain, (M25.662) stiffness L knee  Precautions/Allergies: Other medication; Sulfa (sulfonamide antibiotics); and Ultram [tramadol] , ointments  Fall Risk Score: 3 (? 5 = High Risk)  MD Orders: Eval and Treat MEDICAL/REFERRING DIAGNOSIS:  Left Total Knee Arthroplasty on 17  DATE OF ONSET: 17  REFERRING PHYSICIAN: Carmen Collins MD  RETURN PHYSICIAN APPOINTMENT: 17     INITIAL ASSESSMENT:  Ms. Malvin Patten presents today w/ functional limitations as a result of s/p L TKA on 17 and is  progressing as anticipated w/ moderate L knee pain, swelling, stiffness, weakness and generalized immobility. Her gait remains antalgic with use of a std cane on the R. She requires UE assistance with sit-stand transfers. She lives alone and will benefit from skilled PT to address the following deficits. PROBLEM LIST (Impacting functional limitations):  1. Decreased Strength  2. Decreased ADL/Functional Activities  3. Decreased Transfer Abilities  4. Decreased Ambulation Ability/Technique  5. Decreased Balance  6. Increased Pain  7. Decreased Activity Tolerance  8. Decreased Flexibility/Joint Mobility  9. Edema/Girth INTERVENTIONS PLANNED:  1. Balance Exercise  2. Cryotherapy  3. Electrical Stimulation  4. Gait Training  5. Home Exercise Program (HEP)  6. Manual Therapy  7. Neuromuscular Re-education/Strengthening  8. Range of Motion (ROM)  9. Therapeutic Exercise/Strengthening  10. Ultrasound (US)  11. Vasopneumatic Cold Compression   TREATMENT PLAN:  Effective Dates: 17 TO 17.   Frequency/Duration: 3 times a week for 12 weeks  GOALS: (Goals have been discussed and agreed upon with patient.)  Short-Term Functional Goals: Time Frame: 6 weeks  1. Pt will be compliant and independent with HEP. 2. Pt will improve score on the LEFS to 55/80 to increase pt's overall functional mobility. 3. Pt will improve L knee AROM to 0-120deg to increase pt's ease with transfers and gait. 4. Pt will be able to sit-stand from standard height without use of UE or compensatory weight shifting to rise. 5. Pt will be able to ambulate with a near normal gait pattern for community distances with LRD or no AD. 6. Pt will subjectively report decreased frequency(2-3x/night) of waking due to L knee pain. 7. Pt will be able to ascend 4-6\" steps with good form and control to increase pt's ease with entering her home. Discharge Goals: Time Frame: 12 weeks  1. Pt will improve score on the LEFS to 66/80 to increase pt's overall functional mobility. 2.   Pt will improve FLR from  CK to  CI to increase pt's overall function. 3.   Pt will improve L knee AROM to 0-125deg to increase pt's ease with transfers, gait and balance. 4.   Pt will be able to sit-stand from toilet height w/out use of UE for assistance to rise. 5.   Pt will be able to ascend 6-8\" steps reciprocally and descend 6\" steps reciprocally with use of a rail with good form and control. 6.   Pt will ambulate with a normal gait pattern with no AD to increase pt's overall functional mobility. 7.   Pt will be DC'd from PT to HEP. 8.   Pt will be able to return to gardening, fully caring for her grandchildren and gym activities (cardio and weight machines) with confidence and manageable discomfort. 9.   Pt will only wake 1-2x nightly due to L knee discomfort. Rehabilitation Potential For Stated Goals: Good  Regarding Kieran Domingo's therapy, I certify that the treatment plan above will be carried out by a therapist or under their direction.   Thank you for this referral,  Tuyet Antonio, PT     Referring Physician Signature: Ashleigh Busch MD              Date                    The information in this section was collected on 6/30/17 (except where otherwise noted). HISTORY:   History of Present Injury/Illness (Reason for Referral):  Pt reports she's had L knee OA and subsequent pain for several years, however, it worsened ~ 6 months ago, which sent her to Dr. Pace Back, which subsequently resulted in her L TKA. Past Medical History/Comorbidities:   Ms. Stefan Murray  has a past medical history of Asthma; Diabetes ([facility] (2012); Hypertension; Morbid obesity ([facility] [facility] Thromboembolus ([facility] [address] [facility] Thyroid disease; and Unspecified adverse effect of anesthesia. She also has no past medical history of Adverse effect of anesthesia; Difficult intubation; Endocarditis; Malignant hyperthermia due to anesthesia; Nausea & vomiting; Nicotine vapor product user; Non-nicotine vapor product user; Pseudocholinesterase deficiency; or Rheumatic fever. Ms. Stefan Murray  has a past surgical history that includes gyn; neurological procedure unlisted; carpal tunnel release (1984); orthopaedic; appendectomy (1966); heent; and refractive surgery (2001). She states she also had a L RCR in 2013. Social History/Living Environment:     Pt reports she lives alone in a 1 story home with 1 4-6\" step to enter. She states she has a son, daughter-in-law and grandchildren who live close to her and check in frequently. She drove herself to PT today. Prior Level of Function/Work/Activity:  Pt reports despite her L knee pain, she worked part-time at [employer] ~ 16 hours/week, and worked out at [employer] x 1 hour 4 x /week. She denies having to use a cane prior to surgery. Pt states she would like to retun to return to gardening, gym activities and her part-time job upon completion of PT. Current Medications:       Current Outpatient Prescriptions:     oxyCODONE IR (ROXICODONE) 5 mg immediate release tablet, Take 1 Tab by mouth every three (3) hours as needed.  Max Daily Amount: 40 mg. (Patient taking differently: Take 1 Tab by mouth every three (3) hours as needed for Pain.), Disp: 90 Tab, Rfl: 0    ondansetron (ZOFRAN ODT) 8 mg disintegrating tablet, Take 1 Tab by mouth every eight (8) hours as needed. , Disp: 60 Tab, Rfl: 0    acetaminophen (TYLENOL) 500 mg tablet, Take 2 Tabs by mouth every six (6) hours. , Disp: 120 Tab, Rfl: 0    aspirin 81 mg chewable tablet, Take 1 Tab by mouth two (2) times a day., Disp: 60 Tab, Rfl: 0    cyclobenzaprine (FLEXERIL) 10 mg tablet, Take 0.5 Tabs by mouth three (3) times daily. , Disp: 60 Tab, Rfl: 0    gabapentin (NEURONTIN) 100 mg capsule, Take 1 Cap by mouth two (2) times a day., Disp: 60 Cap, Rfl: 0    HYDROmorphone (DILAUDID) 2 mg tablet, Take 1 Tab by mouth every four (4) hours as needed. Max Daily Amount: 12 mg., Disp: 90 Tab, Rfl: 0    senna-docusate (PERICOLACE) 8.6-50 mg per tablet, Take 2 Tabs by mouth daily. , Disp: 120 Tab, Rfl: 0    zolpidem (AMBIEN) 5 mg tablet, Take 1 Tab by mouth nightly as needed for Sleep. Max Daily Amount: 5 mg., Disp: 30 Tab, Rfl: 0    ergocalciferol (ERGOCALCIFEROL) 50,000 unit capsule, Take 1 Cap by mouth every seven (7) days. Indications: VITAMIN D DEFICIENCY (HIGH DOSE THERAPY) (Patient taking differently: Take 50,000 Units by mouth every seven (7) days. Every Sunday  Indications: VITAMIN D DEFICIENCY (HIGH DOSE THERAPY)), Disp: 12 Cap, Rfl: 2    linagliptin-metformin (JENTADUETO XR) 5-1,000 mg TBph, Take 1 Tab by mouth daily. (Patient taking differently: Take 1 Tab by mouth daily. Indications: type 2 diabetes mellitus), Disp: 90 Tab, Rfl: 3    albuterol (PROVENTIL HFA) 90 mcg/actuation inhaler, Take 1 Puff by inhalation every four (4) hours as needed. (Patient taking differently: Take 1 Puff by inhalation every four (4) hours as needed.  Take morning of surgery per anesthesia guidelines if needed and bring to hospital), Disp: 3 Inhaler, Rfl: 1    montelukast (SINGULAIR) 10 mg tablet, TAKE 1 TABLET DAILY, Disp: 90 Tab, Rfl: 3    levothyroxine (SYNTHROID) 75 mcg tablet, TAKE 1 TABLET DAILY BEFORE BREAKFAST, Disp: 90 Tab, Rfl: 2    hydroCHLOROthiazide (HYDRODIURIL) 25 mg tablet, Take 1 Tab by mouth daily. Indications: am, Disp: 90 Tab, Rfl: 3    amLODIPine-benazepril (LOTREL) 5-10 mg per capsule, Take 1 Cap by mouth daily. , Disp: 90 Cap, Rfl: 3   Date Last Reviewed:  6/30/17   EXAMINATION:   Observation/Orthostatic Postural Assessment:    Atrophy: Pt with moderate atrophy noted in L quad and GS complex compared to the R. Incision: Pt's incision is healing nicely with no signs of infection. No open areas. Palpation: Pt minimally warm to touch with increased swelling and tissue density along medial and posterior L knee  Varsha's Sign: Neg      AROM/Strength:  LE AROM/Strength DATE  6/30/17 DATE  7/3/17   Hip Flexion R: WNL  L: WFL R:   L:    Hip Abduction  R: WFL  L: WFL R:   L:    Hip Extension  R: WFL  L: WFL R:   L:    Knee Flexion  R: WNL  L: 110 R:   L: 120   Knee Extension  R: WNL  L: 6 R:   L: 6   Ankle Dorsiflexion  R: WNL  L:WFL R:   L:   Ankle Plantarflexion  R:WNL  L:WNL R:  L:   Flexibility: HS/Quads/GS R:WNL  L:moderate tightness throughout          Functional Mobility:         Gait/Ambulation:  Pt ambulates with a std cane on the R with decreased step length and stance time on the L. She also demonstrates L knee flexion during swing phase and delayed L HS/TO sequencing. Transfers:  Pt uses B UE, as well as, compensatory weight shifting to the R to rise from a std height chair. Stairs:  Pt ascends/descends 6\" stairs with a step-to gait pattern with use of 1 rail. Balance: Pt with good static balance as observed through activities in the gym today. Dynamic: further testing required.      Standing Heel Raises: Able w/ UE     Balance:           Edema/Girth:  2+    Left Right    Initial Most Recent Initial Most Recent   Lower  Extremity  43.0cm    none           Outcome Measure: Tool Used: Lower Extremity Functional Scale (LEFS)  Score:  Initial: 44/80 Most Recent: X/80 (Date: -- )   Interpretation of Score: 20 questions each scored on a 5 point scale with 0 representing \"extreme difficulty or unable to perform\" and 4 representing \"no difficulty\". The lower the score, the greater the functional disability. 80/80 represents no disability. Minimal detectable change is 9 points. Score 80 79-63 62-48 47-32 31-16 15-1 0   Modifier CH CI CJ CK CL CM CN     ? Mobility - Walking and Moving Around:     - CURRENT STATUS: CK - 40%-59% impaired, limited or restricted    - GOAL STATUS: CI - 1%-19% impaired, limited or restricted    - D/C STATUS:  ---------------To be determined---------------    Medical Necessity:   · Patient is expected to demonstrate progress in strength, range of motion, balance, coordination and functional technique to decrease assistance required with gait, sit-stand transfers and stairs. and increase independence with daily activities allowing her to return to gardening, caring for her grandchildren and gym activities. .  · Patient demonstrates good rehab potential due to higher previous functional level. Reason for Services/Other Comments:  · Patient showed mild signs of depression today. She is very social and feel she will benefit from OP PT to improve gait,balance, coordination, strength, ROM and overall mobility to allow her to return to the gym and other social activties to improve her mental health. .            TREATMENT:     Pre-treatment Symptoms/Complaints:  Pt reports she saw Dr. Geronimo Henry on Friday, and states he's pleased with her current progress. She states he wants her to continue PT for another 4 weeks. Pt c/o increased L knee soreness and stiffness this am due to the rainy weather. She reports exercising in the pool yesterday.    Visit #9  Pain: Initial:3-4     Post:3-4     MANUAL THERAPY: (5min): STM/MFR to L knee to aide with decreasing tissue density/tightness, especially posteriorly and at distal quad/proximal incision, to improve AROM and gait fluidity while decreasing pain. THERAPEUTIC EXERCISE: (45 minutes):  Exercises per grid below to improve mobility and strength. Required moderate visual, verbal and tactile cues to promote proper body alignment, promote wea proper body posture and promote proper body breathing techniques. Progressed repetitions as indicated.      Date:  7/10/17 Date:  7/12/17 Date:  7/17/17 Date:   7/21/17 Date:  7/24/17   Activity/Exercise Parameters Parameters Parameters Parameters Parameters   Nu step L4, 9min L4, 6min L3, 11min L4, 10min L4, 11min   QS        Heel slides w/ ball        SAQ        SLR        Sit-stand 20x 20x Low mat, 20x 20x 25x   Seated knee Ext    Reviewed as HEP, 10x     Seated HSC   Reviewed as HEP     Bridging w/ ball        clams        HS/GS stretch w/ strap 3 x 20sec on board 3 x 20sec on board 3 x 20sec on board 3 x 20sec 3 x 30sec   Education  Walking/marching/stretcing and bicycling in the pool HEP 2x daily     Stairs-ascend/descend reciprocally 4x 6x 8x 5x 8x   8\" step-ups    20x 20x   8\" step-overs     10x   3\" step-ups/downs (forward and lateral) 20x 20x 20x, working on slow descent 20x 30x each   Ladder  ( slow marching, side-stepping, diagonals) 3L each dir Marching in clinic ~ 300\" 4L each     Stdg SLS   Blue pad, 5 x 8 sec Blue pad, 10 x 5 sec    Resisted side-stepping    [place], 4L    Stdg CKC TKE BTB, 20x BTB, 20x BTB  BTB, 30x   Gait in clinic without cane No cane 300'         L knee AROM: 2-122deg (7/21/17)  L knee AROM: 4-117deg (7/24/17)    Treatment/Session Assessment: Pt continues to require vc to slow activities and avoid use of momentum. Her sit-stand and stair ascent quality improved after several repetitions due to improving strength and muscle memory. · Response to Treatment: Pt with more L knee pain and stiffness during treatment today.  Her L knee was slightly more swollen and therefore she demonstrated a decrease in L knee AROM as noted above. · Compliance with Program/Exercises: Pt reports compliance with HEP 2x since last PT visit. · Recommendations/Intent for next treatment session: Continue to work on fluidity and control with ascending/descending 8\" stairs and balance activities.      Future Appointments  Date Time Provider Мария Rosales   7/28/2017 9:00 AM Servando Navarro, PT [facility]   7/31/2017 9:00 AM Servando Navarro PT [facility]   8/4/2017 9:30 AM Ruy Jane MD [facility]       Total Treatment Duration:   PT Patient Time In/Time Out  Time In: 9541  Time Out: 0945Heather Roman Stafford, PT

## 2017-07-28 ENCOUNTER — HOSPITAL ENCOUNTER (OUTPATIENT)
Dept: PHYSICAL THERAPY | Age: 67
Discharge: HOME | End: 2017-07-28
Payer: MEDICARE

## 2017-07-28 PROCEDURE — G8979 MOBILITY GOAL STATUS: HCPCS | Performed by: PHYSICAL THERAPIST

## 2017-07-28 PROCEDURE — 97110 THERAPEUTIC EXERCISES: CPT | Performed by: PHYSICAL THERAPIST

## 2017-07-28 PROCEDURE — G8978 MOBILITY CURRENT STATUS: HCPCS | Performed by: PHYSICAL THERAPIST

## 2017-07-28 NOTE — PROGRESS NOTES
Isi Castelan  : 1950 [address] at [place] [address] [address] [place] [address], [facility], [address] [place] [address] Road  [phone]   [phone]         OUTPATIENT PHYSICAL THERAPY:Progress Report 2017    ICD-10: Treatment Diagnosis: ( R26.2) difficulty walking, (M62.552) muscle atrophy/weakness, (M25.562) L knee pain, (M25.662) stiffness L knee  Precautions/Allergies: Other medication; Sulfa (sulfonamide antibiotics); and Ultram [tramadol] , ointments  Fall Risk Score: 3 (? 5 = High Risk)  MD Orders: Eval and Treat MEDICAL/REFERRING DIAGNOSIS:  Left Total Knee Arthroplasty on 17  DATE OF ONSET: 17  REFERRING PHYSICIAN: Cherelle Bush MD  RETURN PHYSICIAN APPOINTMENT: 17     INITIAL ASSESSMENT:  Ms. Estefany Rodriguez presents today w/ functional limitations as a result of s/p L TKA on 17 and is  progressing as anticipated w/ moderate L knee pain, swelling, stiffness, weakness and generalized immobility. Her gait remains antalgic with use of a std cane on the R. She requires UE assistance with sit-stand transfers. She lives alone and will benefit from skilled PT to address the following deficits. PROBLEM LIST (Impacting functional limitations):  1. Decreased Strength  2. Decreased ADL/Functional Activities  3. Decreased Transfer Abilities  4. Decreased Ambulation Ability/Technique  5. Decreased Balance  6. Increased Pain  7. Decreased Activity Tolerance  8. Decreased Flexibility/Joint Mobility  9. Edema/Girth INTERVENTIONS PLANNED:  1. Balance Exercise  2. Cryotherapy  3. Electrical Stimulation  4. Gait Training  5. Home Exercise Program (HEP)  6. Manual Therapy  7. Neuromuscular Re-education/Strengthening  8. Range of Motion (ROM)  9. Therapeutic Exercise/Strengthening  10. Ultrasound (US)  11. Vasopneumatic Cold Compression   TREATMENT PLAN:  Effective Dates: 17 TO 17.   Frequency/Duration: 3 times a week for 12 weeks  GOALS: (Goals have been discussed and agreed upon with patient.)  Short-Term Functional Goals: Time Frame: 6 weeks  1. Pt will be compliant and independent with HEP.----------------------------------------------------------------------------------------------MET  2. Pt will improve score on the LEFS to 55/80 to increase pt's overall functional mobility. -------------------------------MET, pt scored 61/80  3. Pt will improve L knee AROM to 0-120deg to increase pt's ease with transfers and gait.------------------------------------MET, L knee AROM 0-120deg  4. Pt will be able to sit-stand from standard height without use of UE or compensatory weight shifting to rise. ------MET  5. Pt will be able to ambulate with a near normal gait pattern for community distances with LRD or no AD.  --------MET  6. Pt will subjectively report decreased frequency(2-3x/night) of waking due to L knee pain. ---------------------------MET, pt is waking  1-2x/night  7. Pt will be able to ascend 4-6\" steps with good form and control to increase pt's ease with entering her home. ---------MET    Discharge Goals: Time Frame: 12 weeks  1. Pt will improve score on the LEFS to 66/80 to increase pt's overall functional mobility. 2.   Pt will improve FLR from  CK to  CI to increase pt's overall function. 3.   Pt will improve L knee AROM to 0-125deg to increase pt's ease with transfers, gait and balance. 4.   Pt will be able to sit-stand from toilet height w/out use of UE for assistance to rise. 5.   Pt will be able to ascend 6-8\" steps reciprocally and descend 6\" steps reciprocally with use of a rail with good form and control. 6.   Pt will ambulate with a normal gait pattern with no AD to increase pt's overall functional mobility. 7.   Pt will be DC'd from PT to HEP. 8.   Pt will be able to return to gardening, fully caring for her grandchildren and gym activities (cardio and weight machines) with confidence and manageable discomfort.   9.   Pt will only wake 1-2x nightly due to L knee discomfort. Rehabilitation Potential For Stated Goals: Good  Regarding Landon Domingo's therapy, I certify that the treatment plan above will be carried out by a therapist or under their direction. Thank you for this referral,  Blas Barnes, PT     Referring Physician Signature: Mikey Fleischer, MD              Date                    The information in this section was collected on 6/30/17 (except where otherwise noted). HISTORY:   History of Present Injury/Illness (Reason for Referral):  Pt reports she's had L knee OA and subsequent pain for several years, however, it worsened ~ 6 months ago, which sent her to Dr. Shahnaz Chaudhry, which subsequently resulted in her L TKA. Past Medical History/Comorbidities:   Ms. Dieter Eng  has a past medical history of Asthma; Diabetes ([address] (2012); Hypertension; Morbid obesity ([facility] [address] Thromboembolus ([facility] [address] Thyroid disease; and Unspecified adverse effect of anesthesia. She also has no past medical history of Adverse effect of anesthesia; Difficult intubation; Endocarditis; Malignant hyperthermia due to anesthesia; Nausea & vomiting; Nicotine vapor product user; Non-nicotine vapor product user; Pseudocholinesterase deficiency; or Rheumatic fever. Ms. Dieter Eng  has a past surgical history that includes gyn; neurological procedure unlisted; carpal tunnel release (1984); orthopaedic; appendectomy (1966); heent; and refractive surgery (2001). She states she also had a L RCR in 2013. Social History/Living Environment:     Pt reports she lives alone in a 1 story home with 1 4-6\" step to enter. She states she has a son, daughter-in-law and grandchildren who live close to her and check in frequently. She drove herself to PT today. Prior Level of Function/Work/Activity:  Pt reports despite her L knee pain, she worked part-time at [employer] ~ 16 hours/week, and worked out at [employer] x 1 hour 4 x /week. She denies having to use a cane prior to surgery.    Pt states she would like to retun to return to gardening, gym activities and her part-time job upon completion of PT. Current Medications:       Current Outpatient Prescriptions:     oxyCODONE IR (ROXICODONE) 5 mg immediate release tablet, Take 1 Tab by mouth every three (3) hours as needed. Max Daily Amount: 40 mg. (Patient taking differently: Take 1 Tab by mouth every three (3) hours as needed for Pain.), Disp: 90 Tab, Rfl: 0    ondansetron (ZOFRAN ODT) 8 mg disintegrating tablet, Take 1 Tab by mouth every eight (8) hours as needed. , Disp: 60 Tab, Rfl: 0    acetaminophen (TYLENOL) 500 mg tablet, Take 2 Tabs by mouth every six (6) hours. , Disp: 120 Tab, Rfl: 0    aspirin 81 mg chewable tablet, Take 1 Tab by mouth two (2) times a day., Disp: 60 Tab, Rfl: 0    cyclobenzaprine (FLEXERIL) 10 mg tablet, Take 0.5 Tabs by mouth three (3) times daily. , Disp: 60 Tab, Rfl: 0    gabapentin (NEURONTIN) 100 mg capsule, Take 1 Cap by mouth two (2) times a day., Disp: 60 Cap, Rfl: 0    HYDROmorphone (DILAUDID) 2 mg tablet, Take 1 Tab by mouth every four (4) hours as needed. Max Daily Amount: 12 mg., Disp: 90 Tab, Rfl: 0    senna-docusate (PERICOLACE) 8.6-50 mg per tablet, Take 2 Tabs by mouth daily. , Disp: 120 Tab, Rfl: 0    zolpidem (AMBIEN) 5 mg tablet, Take 1 Tab by mouth nightly as needed for Sleep. Max Daily Amount: 5 mg., Disp: 30 Tab, Rfl: 0    ergocalciferol (ERGOCALCIFEROL) 50,000 unit capsule, Take 1 Cap by mouth every seven (7) days. Indications: VITAMIN D DEFICIENCY (HIGH DOSE THERAPY) (Patient taking differently: Take 50,000 Units by mouth every seven (7) days. Every Sunday  Indications: VITAMIN D DEFICIENCY (HIGH DOSE THERAPY)), Disp: 12 Cap, Rfl: 2    linagliptin-metformin (JENTADUETO XR) 5-1,000 mg TBph, Take 1 Tab by mouth daily. (Patient taking differently: Take 1 Tab by mouth daily.  Indications: type 2 diabetes mellitus), Disp: 90 Tab, Rfl: 3    albuterol (PROVENTIL HFA) 90 mcg/actuation inhaler, Take 1 Puff by inhalation every four (4) hours as needed. (Patient taking differently: Take 1 Puff by inhalation every four (4) hours as needed. Take morning of surgery per anesthesia guidelines if needed and bring to hospital), Disp: 3 Inhaler, Rfl: 1    montelukast (SINGULAIR) 10 mg tablet, TAKE 1 TABLET DAILY, Disp: 90 Tab, Rfl: 3    levothyroxine (SYNTHROID) 75 mcg tablet, TAKE 1 TABLET DAILY BEFORE BREAKFAST, Disp: 90 Tab, Rfl: 2    hydroCHLOROthiazide (HYDRODIURIL) 25 mg tablet, Take 1 Tab by mouth daily. Indications: am, Disp: 90 Tab, Rfl: 3    amLODIPine-benazepril (LOTREL) 5-10 mg per capsule, Take 1 Cap by mouth daily. , Disp: 90 Cap, Rfl: 3   Date Last Reviewed:  6/30/17   EXAMINATION:   Observation/Orthostatic Postural Assessment:    Atrophy: Pt with moderate atrophy noted in L quad and GS complex compared to the R. Incision: Pt's incision is healing nicely with no signs of infection. No open areas. Palpation: Pt minimally warm to touch with increased swelling and tissue density along medial and posterior L knee  Varsha's Sign: Neg      AROM/Strength:  LE AROM/Strength DATE  6/30/17 DATE  7/3/17   Hip Flexion R: WNL  L: WFL R:   L:    Hip Abduction  R: WFL  L: WFL R:   L:    Hip Extension  R: WFL  L: WFL R:   L:    Knee Flexion  R: WNL  L: 110 R:   L: 120   Knee Extension  R: WNL  L: 6 R:   L: 6   Ankle Dorsiflexion  R: WNL  L:WFL R:   L:   Ankle Plantarflexion  R:WNL  L:WNL R:  L:   Flexibility: HS/Quads/GS R:WNL  L:moderate tightness throughout          Functional Mobility:         Gait/Ambulation:  Pt ambulates with a std cane on the R with decreased step length and stance time on the L. She also demonstrates L knee flexion during swing phase and delayed L HS/TO sequencing. Transfers:  Pt uses B UE, as well as, compensatory weight shifting to the R to rise from a std height chair. Stairs:  Pt ascends/descends 6\" stairs with a step-to gait pattern with use of 1 rail.   Balance: Pt with good static balance as observed through activities in the gym today. Dynamic: further testing required. Standing Heel Raises: Able w/ UE     Balance:           Edema/Girth:  2+    Left Right    Initial Most Recent Initial Most Recent   Lower  Extremity  43.0cm    none           Outcome Measure: Tool Used: Lower Extremity Functional Scale (LEFS)  Score:  Initial: 44/80 Most Recent: 61/80 (Date: 7/28/17)   Interpretation of Score: 20 questions each scored on a 5 point scale with 0 representing \"extreme difficulty or unable to perform\" and 4 representing \"no difficulty\". The lower the score, the greater the functional disability. 80/80 represents no disability. Minimal detectable change is 9 points. Score 80 79-63 62-48 47-32 31-16 15-1 0   Modifier CH CI CJ CK CL CM CN     ? Mobility - Walking and Moving Around:     - CURRENT STATUS: CJ - 20%-39% impaired, limited or restricted    - GOAL STATUS: CI - 1%-19% impaired, limited or restricted    - D/C STATUS:  ---------------To be determined---------------    Medical Necessity:   · Patient is expected to demonstrate progress in strength, range of motion, balance, coordination and functional technique to decrease assistance required with gait, sit-stand transfers and stairs. and increase independence with daily activities allowing her to return to gardening, caring for her grandchildren and gym activities. .  · Patient demonstrates good rehab potential due to higher previous functional level. Reason for Services/Other Comments:  · Patient showed mild signs of depression today. She is very social and feel she will benefit from OP PT to improve gait,balance, coordination, strength, ROM and overall mobility to allow her to return to the gym and other social activties to improve her mental health. .            TREATMENT:     Pre-treatment Symptoms/Complaints:   Pt reports she can tell her L knee is getting better everyday with less pain and stiffness and increased strength. Visit #10  Pain: Initial:1-2     Post:2-3     MANUAL THERAPY: (5min): STM/MFR to L knee to aide with decreasing tissue density/tightness, especially posteriorly and at distal quad/proximal incision, to improve AROM and gait fluidity while decreasing pain. THERAPEUTIC EXERCISE: (55 minutes):  Exercises per grid below to improve mobility and strength. Required moderate visual, verbal and tactile cues to promote proper body alignment, promote wea proper body posture and promote proper body breathing techniques. Progressed repetitions as indicated.      Date:  7/12/17 Date:  7/17/17 Date:   7/21/17 Date:  7/24/17 Date:  7/28/17    Activity/Exercise Parameters Parameters Parameters Parameters Parameters    Nu step L4, 6min L3, 11min L4, 10min L4, 11min L5, 10min    QS         Heel slides w/ ball         SAQ         SLR         Sit-stand 20x Low mat, 20x 20x 25x 25x    Seated knee Ext   Reviewed as HEP, 10x       Seated HSC  Reviewed as HEP       Bridging w/ ball         clams         HS/GS stretch w/ strap 3 x 20sec on board 3 x 20sec on board 3 x 20sec 3 x 30sec 3 x 30sec    Education Walking/marching/stretcing and bicycling in the pool HEP 2x daily       Stairs-ascend/descend reciprocally 6x 8x 5x 8x 8x    8\" step-ups   20x 20x 30x    8\" step-overs    10x 20x    3\" step-ups/downs (forward and lateral) 20x 20x, working on slow descent 20x 30x each 30x    Ladder  ( slow marching, side-stepping, diagonals) Marching in clinic ~ 300\" 4L each   4L each    Stdg SLS  Blue pad, 5 x 8 sec Blue pad, 10 x 5 sec  Blue pad, 10 x 10sec    Resisted side-stepping   [place], 4L      Stdg CKC TKE BTB, 20x BTB  BTB, 30x BTB, 30x    Gait in clinic without cane           L knee AROM: 0-122deg (7-28-17)    Treatment/Session Assessment: Pt has done well with PT, meeting all short term goals listed above. Her L knee AROM and strength are WFL.  She is able to sit-stand from std height, as well as, ascend 6-8\" steps and descend 6\" steps reciprocally with good form and control. Her pain is manageable at 1-2/10 most of the time. Her gait has normalized and is safe. She remains challenged by dynamic balance activities and end ROM remains very painful. I will continue to progress pt toward her unmet long term goals. · Response to Treatment: Pt with improved L knee mobility today. She remains challenged by dynamic balance activities with 3 LOB today. · Compliance with Program/Exercises: Pt reports compliance with HEP 3x since last PT visit. · Recommendations/Intent for next treatment session: Continue to work on fluidity and control with ascending/descending 8\" stairs and balance activities.      Future Appointments  Date Time Provider Мария Rosales   7/31/2017 9:00 AM Hina Ordoñez, PT [facility] [place]   8/4/2017 9:30 AM Crystal Regalado MD [facility]       Total Treatment Duration:   PT Patient Time In/Time Out  Time In: 0900  Time Out: 1000Heather Chun Foote, PT

## 2017-07-31 ENCOUNTER — HOSPITAL ENCOUNTER (OUTPATIENT)
Dept: PHYSICAL THERAPY | Age: 67
Discharge: HOME | End: 2017-07-31
Payer: MEDICARE

## 2017-07-31 PROCEDURE — 97110 THERAPEUTIC EXERCISES: CPT | Performed by: PHYSICAL THERAPIST

## 2017-07-31 NOTE — PROGRESS NOTES
Alka Fischer  : 1950 [address] at [place] [address] [address] [place] [address], [facility], [address] [place] [address] Road  [phone]   [phone]         OUTPATIENT PHYSICAL THERAPY:Progress Report 2017    ICD-10: Treatment Diagnosis: ( R26.2) difficulty walking, (M62.552) muscle atrophy/weakness, (M25.562) L knee pain, (M25.662) stiffness L knee  Precautions/Allergies: Other medication; Sulfa (sulfonamide antibiotics); and Ultram [tramadol] , ointments  Fall Risk Score: 3 (? 5 = High Risk)  MD Orders: Eval and Treat MEDICAL/REFERRING DIAGNOSIS:  Left Total Knee Arthroplasty on 17  DATE OF ONSET: 17  REFERRING PHYSICIAN: Huma Davidson MD  RETURN PHYSICIAN APPOINTMENT: 17     INITIAL ASSESSMENT:  Ms. Mae Bingham presents today w/ functional limitations as a result of s/p L TKA on 17 and is  progressing as anticipated w/ moderate L knee pain, swelling, stiffness, weakness and generalized immobility. Her gait remains antalgic with use of a std cane on the R. She requires UE assistance with sit-stand transfers. She lives alone and will benefit from skilled PT to address the following deficits. PROBLEM LIST (Impacting functional limitations):  1. Decreased Strength  2. Decreased ADL/Functional Activities  3. Decreased Transfer Abilities  4. Decreased Ambulation Ability/Technique  5. Decreased Balance  6. Increased Pain  7. Decreased Activity Tolerance  8. Decreased Flexibility/Joint Mobility  9. Edema/Girth INTERVENTIONS PLANNED:  1. Balance Exercise  2. Cryotherapy  3. Electrical Stimulation  4. Gait Training  5. Home Exercise Program (HEP)  6. Manual Therapy  7. Neuromuscular Re-education/Strengthening  8. Range of Motion (ROM)  9. Therapeutic Exercise/Strengthening  10. Ultrasound (US)  11. Vasopneumatic Cold Compression   TREATMENT PLAN:  Effective Dates: 17 TO 17.   Frequency/Duration: 3 times a week for 12 weeks  GOALS: (Goals have been discussed and agreed upon with patient.)  Short-Term Functional Goals: Time Frame: 6 weeks  1. Pt will be compliant and independent with HEP.----------------------------------------------------------------------------------------------MET  2. Pt will improve score on the LEFS to 55/80 to increase pt's overall functional mobility. -------------------------------MET, pt scored 61/80  3. Pt will improve L knee AROM to 0-120deg to increase pt's ease with transfers and gait.------------------------------------MET, L knee AROM 0-120deg  4. Pt will be able to sit-stand from standard height without use of UE or compensatory weight shifting to rise. ------MET  5. Pt will be able to ambulate with a near normal gait pattern for community distances with LRD or no AD.  --------MET  6. Pt will subjectively report decreased frequency(2-3x/night) of waking due to L knee pain. ---------------------------MET, pt is waking  1-2x/night  7. Pt will be able to ascend 4-6\" steps with good form and control to increase pt's ease with entering her home. ---------MET    Discharge Goals: Time Frame: 12 weeks  1. Pt will improve score on the LEFS to 66/80 to increase pt's overall functional mobility. 2.   Pt will improve FLR from  CK to  CI to increase pt's overall function. 3.   Pt will improve L knee AROM to 0-125deg to increase pt's ease with transfers, gait and balance. 4.   Pt will be able to sit-stand from toilet height w/out use of UE for assistance to rise. 5.   Pt will be able to ascend 6-8\" steps reciprocally and descend 6\" steps reciprocally with use of a rail with good form and control. 6.   Pt will ambulate with a normal gait pattern with no AD to increase pt's overall functional mobility. 7.   Pt will be DC'd from PT to HEP. 8.   Pt will be able to return to gardening, fully caring for her grandchildren and gym activities (cardio and weight machines) with confidence and manageable discomfort.   9.   Pt will only wake 1-2x nightly due to L knee discomfort. Rehabilitation Potential For Stated Goals: Good  Regarding Elen Javier Domingo's therapy, I certify that the treatment plan above will be carried out by a therapist or under their direction. Thank you for this referral,  uV Acosta, PT     Referring Physician Signature: Norman Olsen MD              Date                    The information in this section was collected on 6/30/17 (except where otherwise noted). HISTORY:   History of Present Injury/Illness (Reason for Referral):  Pt reports she's had L knee OA and subsequent pain for several years, however, it worsened ~ 6 months ago, which sent her to Dr. Lien Tamez, which subsequently resulted in her L TKA. Past Medical History/Comorbidities:   Ms. Emmanuel Henry  has a past medical history of Asthma; Diabetes ([address] (2012); Hypertension; Morbid obesity ([facility] [address] Thromboembolus ([facility] [address] Thyroid disease; and Unspecified adverse effect of anesthesia. She also has no past medical history of Adverse effect of anesthesia; Difficult intubation; Endocarditis; Malignant hyperthermia due to anesthesia; Nausea & vomiting; Nicotine vapor product user; Non-nicotine vapor product user; Pseudocholinesterase deficiency; or Rheumatic fever. Ms. Emmanuel Henry  has a past surgical history that includes gyn; neurological procedure unlisted; carpal tunnel release (1984); orthopaedic; appendectomy (1966); heent; and refractive surgery (2001). She states she also had a L RCR in 2013. Social History/Living Environment:     Pt reports she lives alone in a 1 story home with 1 4-6\" step to enter. She states she has a son, daughter-in-law and grandchildren who live close to her and check in frequently. She drove herself to PT today. Prior Level of Function/Work/Activity:  Pt reports despite her L knee pain, she worked part-time at [employer] ~ 16 hours/week, and worked out at [employer] x 1 hour 4 x /week. She denies having to use a cane prior to surgery.    Pt states she would like to retun to return to gardening, gym activities and her part-time job upon completion of PT. Current Medications:       Current Outpatient Prescriptions:     oxyCODONE IR (ROXICODONE) 5 mg immediate release tablet, Take 1 Tab by mouth every three (3) hours as needed. Max Daily Amount: 40 mg. (Patient taking differently: Take 1 Tab by mouth every three (3) hours as needed for Pain.), Disp: 90 Tab, Rfl: 0    ondansetron (ZOFRAN ODT) 8 mg disintegrating tablet, Take 1 Tab by mouth every eight (8) hours as needed. , Disp: 60 Tab, Rfl: 0    acetaminophen (TYLENOL) 500 mg tablet, Take 2 Tabs by mouth every six (6) hours. , Disp: 120 Tab, Rfl: 0    aspirin 81 mg chewable tablet, Take 1 Tab by mouth two (2) times a day., Disp: 60 Tab, Rfl: 0    cyclobenzaprine (FLEXERIL) 10 mg tablet, Take 0.5 Tabs by mouth three (3) times daily. , Disp: 60 Tab, Rfl: 0    gabapentin (NEURONTIN) 100 mg capsule, Take 1 Cap by mouth two (2) times a day., Disp: 60 Cap, Rfl: 0    HYDROmorphone (DILAUDID) 2 mg tablet, Take 1 Tab by mouth every four (4) hours as needed. Max Daily Amount: 12 mg., Disp: 90 Tab, Rfl: 0    senna-docusate (PERICOLACE) 8.6-50 mg per tablet, Take 2 Tabs by mouth daily. , Disp: 120 Tab, Rfl: 0    zolpidem (AMBIEN) 5 mg tablet, Take 1 Tab by mouth nightly as needed for Sleep. Max Daily Amount: 5 mg., Disp: 30 Tab, Rfl: 0    ergocalciferol (ERGOCALCIFEROL) 50,000 unit capsule, Take 1 Cap by mouth every seven (7) days. Indications: VITAMIN D DEFICIENCY (HIGH DOSE THERAPY) (Patient taking differently: Take 50,000 Units by mouth every seven (7) days. Every Sunday  Indications: VITAMIN D DEFICIENCY (HIGH DOSE THERAPY)), Disp: 12 Cap, Rfl: 2    linagliptin-metformin (JENTADUETO XR) 5-1,000 mg TBph, Take 1 Tab by mouth daily. (Patient taking differently: Take 1 Tab by mouth daily.  Indications: type 2 diabetes mellitus), Disp: 90 Tab, Rfl: 3    albuterol (PROVENTIL HFA) 90 mcg/actuation inhaler, Take 1 Puff by inhalation every four (4) hours as needed. (Patient taking differently: Take 1 Puff by inhalation every four (4) hours as needed. Take morning of surgery per anesthesia guidelines if needed and bring to hospital), Disp: 3 Inhaler, Rfl: 1    montelukast (SINGULAIR) 10 mg tablet, TAKE 1 TABLET DAILY, Disp: 90 Tab, Rfl: 3    levothyroxine (SYNTHROID) 75 mcg tablet, TAKE 1 TABLET DAILY BEFORE BREAKFAST, Disp: 90 Tab, Rfl: 2    hydroCHLOROthiazide (HYDRODIURIL) 25 mg tablet, Take 1 Tab by mouth daily. Indications: am, Disp: 90 Tab, Rfl: 3    amLODIPine-benazepril (LOTREL) 5-10 mg per capsule, Take 1 Cap by mouth daily. , Disp: 90 Cap, Rfl: 3   Date Last Reviewed:  6/30/17   EXAMINATION:   Observation/Orthostatic Postural Assessment:    Atrophy: Pt with moderate atrophy noted in L quad and GS complex compared to the R. Incision: Pt's incision is healing nicely with no signs of infection. No open areas. Palpation: Pt minimally warm to touch with increased swelling and tissue density along medial and posterior L knee  Varsha's Sign: Neg      AROM/Strength:  LE AROM/Strength DATE  6/30/17 DATE  7/3/17   Hip Flexion R: WNL  L: WFL R:   L:    Hip Abduction  R: WFL  L: WFL R:   L:    Hip Extension  R: WFL  L: WFL R:   L:    Knee Flexion  R: WNL  L: 110 R:   L: 120   Knee Extension  R: WNL  L: 6 R:   L: 6   Ankle Dorsiflexion  R: WNL  L:WFL R:   L:   Ankle Plantarflexion  R:WNL  L:WNL R:  L:   Flexibility: HS/Quads/GS R:WNL  L:moderate tightness throughout          Functional Mobility:         Gait/Ambulation:  Pt ambulates with a std cane on the R with decreased step length and stance time on the L. She also demonstrates L knee flexion during swing phase and delayed L HS/TO sequencing. Transfers:  Pt uses B UE, as well as, compensatory weight shifting to the R to rise from a std height chair. Stairs:  Pt ascends/descends 6\" stairs with a step-to gait pattern with use of 1 rail.   Balance: Pt with good static balance as observed through activities in the gym today. Dynamic: further testing required. Standing Heel Raises: Able w/ UE     Balance:           Edema/Girth:  2+    Left Right    Initial Most Recent Initial Most Recent   Lower  Extremity  43.0cm    none           Outcome Measure: Tool Used: Lower Extremity Functional Scale (LEFS)  Score:  Initial: 44/80 Most Recent: 61/80 (Date: 7/28/17)   Interpretation of Score: 20 questions each scored on a 5 point scale with 0 representing \"extreme difficulty or unable to perform\" and 4 representing \"no difficulty\". The lower the score, the greater the functional disability. 80/80 represents no disability. Minimal detectable change is 9 points. Score 80 79-63 62-48 47-32 31-16 15-1 0   Modifier CH CI CJ CK CL CM CN     ? Mobility - Walking and Moving Around:     - CURRENT STATUS: CJ - 20%-39% impaired, limited or restricted    - GOAL STATUS: CI - 1%-19% impaired, limited or restricted    - D/C STATUS:  ---------------To be determined---------------    Medical Necessity:   · Patient is expected to demonstrate progress in strength, range of motion, balance, coordination and functional technique to decrease assistance required with gait, sit-stand transfers and stairs. and increase independence with daily activities allowing her to return to gardening, caring for her grandchildren and gym activities. .  · Patient demonstrates good rehab potential due to higher previous functional level. Reason for Services/Other Comments:  · Patient showed mild signs of depression today. She is very social and feel she will benefit from OP PT to improve gait,balance, coordination, strength, ROM and overall mobility to allow her to return to the gym and other social activties to improve her mental health. .            TREATMENT:     Pre-treatment Symptoms/Complaints:   Pt reports some L knee stiffness remains, but it is improving.  She states pain at end range flexion and extension. Visit #11  Pain: Initial:2-3     Post:2     MANUAL THERAPY: (5min): STM/MFR to L knee to aide with decreasing tissue density/tightness, especially posteriorly and at distal quad/proximal incision, to improve AROM and gait fluidity while decreasing pain. THERAPEUTIC EXERCISE: (45 minutes):  Exercises per grid below to improve mobility and strength. Required moderate visual, verbal and tactile cues to promote proper body alignment, promote wea proper body posture and promote proper body breathing techniques. Progressed repetitions as indicated.      Date:   7/21/17 Date:  7/24/17 Date:  7/28/17 Date:  7/31/17   Activity/Exercise Parameters Parameters Parameters Parameters   Nu step L4, 10min L4, 11min L5, 10min L5, 10min   QS       Heel slides w/ ball       SAQ       SLR       Sit-stand 20x 25x 25x L LE bias, 25x   Seated knee Ext        Seated HSC       Bridging w/ ball       clams       HS/GS stretch w/ strap 3 x 20sec 3 x 30sec 3 x 30sec 3 x 30sec   Education       Stairs-ascend/descend reciprocally 5x 8x 8x 10x   8\" box step-overs/obstacle negotiation (forward/lateral)    30x each   8\" step-ups 20x 20x 30x 15x   8\" step-overs  10x 20x 20x   3\" step-ups/downs (forward and lateral) 20x 30x each 30x    Ladder  ( slow marching, side-stepping, diagonals)   4L each    Stdg SLS Blue pad, 10 x 5 sec  Blue pad, 10 x 10sec    Resisted side-stepping [place], 4L   Green, 4L   Stdg CKC TKE  BTB, 30x BTB, 30x BTB, 20x   Gait in clinic without cane         L knee AROM: 0-122deg (7-31-17)    Treatment/Session Assessment: Pt's balance and quality with 8\" box step-overs/ obstacle negotiation is improving. She still requires vc to sow activities and shorten the movement to increase muscle control and balance. Pt is progressing toward current long term goals. · Response to Treatment: Pt with min L knee pain with sit-stand with L LE bias.  She was challenged by an increase in tband resistance today with resisted side-stepping due to residual B hip abductor weakness, but completed the activity. · Compliance with Program/Exercises: Pt reports compliance with HEP 2-3x since last PT visit. · Recommendations/Intent for next treatment session:  Continue to progress balance activities and hip strengthening.      Future Appointments  Date Time Provider Мария Rosales   8/4/2017 9:30 AM Maninder Cabrera MD [facility]       Total Treatment Duration:   PT Patient Time In/Time Out  Time In: 0900  Time Out: 0950Heather Kelechi Briceno PT

## 2017-08-03 ENCOUNTER — HOSPITAL ENCOUNTER (OUTPATIENT)
Dept: PHYSICAL THERAPY | Age: 67
Discharge: HOME | End: 2017-08-03
Payer: MEDICARE

## 2017-08-03 PROCEDURE — 97110 THERAPEUTIC EXERCISES: CPT | Performed by: PHYSICAL THERAPIST

## 2017-08-03 NOTE — PROGRESS NOTES
Kacy Grimaldo  : 1950 [address] at [place] [address] [address] [facility], [facility], [address] [place] [address] Road  [phone]   [phone]         OUTPATIENT PHYSICAL THERAPY:Progress Report 8/3/2017    ICD-10: Treatment Diagnosis: ( R26.2) difficulty walking, (M62.552) muscle atrophy/weakness, (M25.562) L knee pain, (M25.662) stiffness L knee  Precautions/Allergies: Other medication; Sulfa (sulfonamide antibiotics); and Ultram [tramadol] , ointments  Fall Risk Score: 3 (? 5 = High Risk)  MD Orders: Eval and Treat MEDICAL/REFERRING DIAGNOSIS:  Left Total Knee Arthroplasty on 17  DATE OF ONSET: 17  REFERRING PHYSICIAN: Margaret Mann MD  RETURN PHYSICIAN APPOINTMENT: 17     INITIAL ASSESSMENT:  Ms. Jacob Noe presents today w/ functional limitations as a result of s/p L TKA on 17 and is  progressing as anticipated w/ moderate L knee pain, swelling, stiffness, weakness and generalized immobility. Her gait remains antalgic with use of a std cane on the R. She requires UE assistance with sit-stand transfers. She lives alone and will benefit from skilled PT to address the following deficits. PROBLEM LIST (Impacting functional limitations):  1. Decreased Strength  2. Decreased ADL/Functional Activities  3. Decreased Transfer Abilities  4. Decreased Ambulation Ability/Technique  5. Decreased Balance  6. Increased Pain  7. Decreased Activity Tolerance  8. Decreased Flexibility/Joint Mobility  9. Edema/Girth INTERVENTIONS PLANNED:  1. Balance Exercise  2. Cryotherapy  3. Electrical Stimulation  4. Gait Training  5. Home Exercise Program (HEP)  6. Manual Therapy  7. Neuromuscular Re-education/Strengthening  8. Range of Motion (ROM)  9. Therapeutic Exercise/Strengthening  10. Ultrasound (US)  11. Vasopneumatic Cold Compression   TREATMENT PLAN:  Effective Dates: 17 TO 17.   Frequency/Duration: 3 times a week for 12 weeks  GOALS: (Goals have been discussed and agreed upon with patient.)  Short-Term Functional Goals: Time Frame: 6 weeks  1. Pt will be compliant and independent with HEP.----------------------------------------------------------------------------------------------MET  2. Pt will improve score on the LEFS to 55/80 to increase pt's overall functional mobility. -------------------------------MET, pt scored 61/80  3. Pt will improve L knee AROM to 0-120deg to increase pt's ease with transfers and gait.------------------------------------MET, L knee AROM 0-120deg  4. Pt will be able to sit-stand from standard height without use of UE or compensatory weight shifting to rise. ------MET  5. Pt will be able to ambulate with a near normal gait pattern for community distances with LRD or no AD.  --------MET  6. Pt will subjectively report decreased frequency(2-3x/night) of waking due to L knee pain. ---------------------------MET, pt is waking  1-2x/night  7. Pt will be able to ascend 4-6\" steps with good form and control to increase pt's ease with entering her home. ---------MET    Discharge Goals: Time Frame: 12 weeks  1. Pt will improve score on the LEFS to 66/80 to increase pt's overall functional mobility. 2.   Pt will improve FLR from  CK to  CI to increase pt's overall function. 3.   Pt will improve L knee AROM to 0-125deg to increase pt's ease with transfers, gait and balance. 4.   Pt will be able to sit-stand from toilet height w/out use of UE for assistance to rise. 5.   Pt will be able to ascend 6-8\" steps reciprocally and descend 6\" steps reciprocally with use of a rail with good form and control. 6.   Pt will ambulate with a normal gait pattern with no AD to increase pt's overall functional mobility. 7.   Pt will be DC'd from PT to HEP. 8.   Pt will be able to return to gardening, fully caring for her grandchildren and gym activities (cardio and weight machines) with confidence and manageable discomfort.   9.   Pt will only wake 1-2x nightly due to L knee discomfort. Rehabilitation Potential For Stated Goals: Good  Regarding Mike Domingo's therapy, I certify that the treatment plan above will be carried out by a therapist or under their direction. Thank you for this referral,  Hina Ordoñez, PT     Referring Physician Signature: Anne Marie Espino MD              Date                    The information in this section was collected on 6/30/17 (except where otherwise noted). HISTORY:   History of Present Injury/Illness (Reason for Referral):  Pt reports she's had L knee OA and subsequent pain for several years, however, it worsened ~ 6 months ago, which sent her to Dr. Aline Fuentes, which subsequently resulted in her L TKA. Past Medical History/Comorbidities:   Ms. Pedro Llanos  has a past medical history of Asthma; Diabetes ([address] (2012); Hypertension; Morbid obesity ([facility] [address] Thromboembolus ([facility] [address] Thyroid disease; and Unspecified adverse effect of anesthesia. She also has no past medical history of Adverse effect of anesthesia; Difficult intubation; Endocarditis; Malignant hyperthermia due to anesthesia; Nausea & vomiting; Nicotine vapor product user; Non-nicotine vapor product user; Pseudocholinesterase deficiency; or Rheumatic fever. Ms. Pedro Llanos  has a past surgical history that includes gyn; neurological procedure unlisted; carpal tunnel release (1984); orthopaedic; appendectomy (1966); heent; and refractive surgery (2001). She states she also had a L RCR in 2013. Social History/Living Environment:     Pt reports she lives alone in a 1 story home with 1 4-6\" step to enter. She states she has a son, daughter-in-law and grandchildren who live close to her and check in frequently. She drove herself to PT today. Prior Level of Function/Work/Activity:  Pt reports despite her L knee pain, she worked part-time at [employer] ~ 16 hours/week, and worked out at [employer] x 1 hour 4 x /week. She denies having to use a cane prior to surgery.    Pt states she would like to retun to return to gardening, gym activities and her part-time job upon completion of PT. Current Medications:       Current Outpatient Prescriptions:     oxyCODONE IR (ROXICODONE) 5 mg immediate release tablet, Take 1 Tab by mouth every three (3) hours as needed. Max Daily Amount: 40 mg. (Patient taking differently: Take 1 Tab by mouth every three (3) hours as needed for Pain.), Disp: 90 Tab, Rfl: 0    ondansetron (ZOFRAN ODT) 8 mg disintegrating tablet, Take 1 Tab by mouth every eight (8) hours as needed. , Disp: 60 Tab, Rfl: 0    acetaminophen (TYLENOL) 500 mg tablet, Take 2 Tabs by mouth every six (6) hours. , Disp: 120 Tab, Rfl: 0    aspirin 81 mg chewable tablet, Take 1 Tab by mouth two (2) times a day., Disp: 60 Tab, Rfl: 0    cyclobenzaprine (FLEXERIL) 10 mg tablet, Take 0.5 Tabs by mouth three (3) times daily. , Disp: 60 Tab, Rfl: 0    gabapentin (NEURONTIN) 100 mg capsule, Take 1 Cap by mouth two (2) times a day., Disp: 60 Cap, Rfl: 0    HYDROmorphone (DILAUDID) 2 mg tablet, Take 1 Tab by mouth every four (4) hours as needed. Max Daily Amount: 12 mg., Disp: 90 Tab, Rfl: 0    senna-docusate (PERICOLACE) 8.6-50 mg per tablet, Take 2 Tabs by mouth daily. , Disp: 120 Tab, Rfl: 0    zolpidem (AMBIEN) 5 mg tablet, Take 1 Tab by mouth nightly as needed for Sleep. Max Daily Amount: 5 mg., Disp: 30 Tab, Rfl: 0    ergocalciferol (ERGOCALCIFEROL) 50,000 unit capsule, Take 1 Cap by mouth every seven (7) days. Indications: VITAMIN D DEFICIENCY (HIGH DOSE THERAPY) (Patient taking differently: Take 50,000 Units by mouth every seven (7) days. Every Sunday  Indications: VITAMIN D DEFICIENCY (HIGH DOSE THERAPY)), Disp: 12 Cap, Rfl: 2    linagliptin-metformin (JENTADUETO XR) 5-1,000 mg TBph, Take 1 Tab by mouth daily. (Patient taking differently: Take 1 Tab by mouth daily.  Indications: type 2 diabetes mellitus), Disp: 90 Tab, Rfl: 3    albuterol (PROVENTIL HFA) 90 mcg/actuation inhaler, Take 1 Puff by inhalation every four (4) hours as needed. (Patient taking differently: Take 1 Puff by inhalation every four (4) hours as needed. Take morning of surgery per anesthesia guidelines if needed and bring to hospital), Disp: 3 Inhaler, Rfl: 1    montelukast (SINGULAIR) 10 mg tablet, TAKE 1 TABLET DAILY, Disp: 90 Tab, Rfl: 3    levothyroxine (SYNTHROID) 75 mcg tablet, TAKE 1 TABLET DAILY BEFORE BREAKFAST, Disp: 90 Tab, Rfl: 2    hydroCHLOROthiazide (HYDRODIURIL) 25 mg tablet, Take 1 Tab by mouth daily. Indications: am, Disp: 90 Tab, Rfl: 3    amLODIPine-benazepril (LOTREL) 5-10 mg per capsule, Take 1 Cap by mouth daily. , Disp: 90 Cap, Rfl: 3   Date Last Reviewed:  6/30/17   EXAMINATION:   Observation/Orthostatic Postural Assessment:    Atrophy: Pt with moderate atrophy noted in L quad and GS complex compared to the R. Incision: Pt's incision is healing nicely with no signs of infection. No open areas. Palpation: Pt minimally warm to touch with increased swelling and tissue density along medial and posterior L knee  Varsha's Sign: Neg      AROM/Strength:  LE AROM/Strength DATE  6/30/17 DATE  7/3/17   Hip Flexion R: WNL  L: WFL R:   L:    Hip Abduction  R: WFL  L: WFL R:   L:    Hip Extension  R: WFL  L: WFL R:   L:    Knee Flexion  R: WNL  L: 110 R:   L: 120   Knee Extension  R: WNL  L: 6 R:   L: 6   Ankle Dorsiflexion  R: WNL  L:WFL R:   L:   Ankle Plantarflexion  R:WNL  L:WNL R:  L:   Flexibility: HS/Quads/GS R:WNL  L:moderate tightness throughout          Functional Mobility:         Gait/Ambulation:  Pt ambulates with a std cane on the R with decreased step length and stance time on the L. She also demonstrates L knee flexion during swing phase and delayed L HS/TO sequencing. Transfers:  Pt uses B UE, as well as, compensatory weight shifting to the R to rise from a std height chair. Stairs:  Pt ascends/descends 6\" stairs with a step-to gait pattern with use of 1 rail.   Balance: Pt with good static balance as observed through activities in the gym today. Dynamic: further testing required. Standing Heel Raises: Able w/ UE     Balance:           Edema/Girth:  2+    Left Right    Initial Most Recent Initial Most Recent   Lower  Extremity  43.0cm    none           Outcome Measure: Tool Used: Lower Extremity Functional Scale (LEFS)  Score:  Initial: 44/80 Most Recent: 61/80 (Date: 7/28/17)   Interpretation of Score: 20 questions each scored on a 5 point scale with 0 representing \"extreme difficulty or unable to perform\" and 4 representing \"no difficulty\". The lower the score, the greater the functional disability. 80/80 represents no disability. Minimal detectable change is 9 points. Score 80 79-63 62-48 47-32 31-16 15-1 0   Modifier CH CI CJ CK CL CM CN     ? Mobility - Walking and Moving Around:     - CURRENT STATUS: CJ - 20%-39% impaired, limited or restricted    - GOAL STATUS: CI - 1%-19% impaired, limited or restricted    - D/C STATUS:  ---------------To be determined---------------    Medical Necessity:   · Patient is expected to demonstrate progress in strength, range of motion, balance, coordination and functional technique to decrease assistance required with gait, sit-stand transfers and stairs. and increase independence with daily activities allowing her to return to gardening, caring for her grandchildren and gym activities. .  · Patient demonstrates good rehab potential due to higher previous functional level. Reason for Services/Other Comments:  · Patient showed mild signs of depression today. She is very social and feel she will benefit from OP PT to improve gait,balance, coordination, strength, ROM and overall mobility to allow her to return to the gym and other social activties to improve her mental health. .            TREATMENT:     Pre-treatment Symptoms/Complaints:   Pt reports she's not having a good day today due to over doing it hiking with her grandkids.  She c/o increased L knee pain, stiffness, swelling and tightness. Visit #12  Pain: Initial:5     Post:3     MANUAL THERAPY: (0min): STM/MFR to L knee to aide with decreasing tissue density/tightness, especially posteriorly and at distal quad/proximal incision, to improve AROM and gait fluidity while decreasing pain. THERAPEUTIC EXERCISE: (35 minutes):  Exercises per grid below to improve mobility and strength. Required moderate visual, verbal and tactile cues to promote proper body alignment, promote wea proper body posture and promote proper body breathing techniques. Progressed repetitions as indicated.      Date:  7/24/17 Date:  7/28/17 Date:  7/31/17 Date:  8/3/17   Activity/Exercise Parameters Parameters Parameters Parameters   Nu step L4, 11min L5, 10min L5, 10min L3.5, 10min   QS       Heel slides w/ ball       SAQ       SLR       Sit-stand 25x 25x L LE bias, 25x 25x   Seated knee Ext        Seated HSC       Bridging w/ ball       clams       HS/GS stretch w/ strap 3 x 30sec 3 x 30sec 3 x 30sec 3 x 30sec   Education       Stairs-ascend/descend reciprocally 8x 8x 10x 6x   8\" box step-overs/obstacle negotiation (forward/lateral)   30x each    8\" step-ups 20x 30x 15x    8\" step-overs 10x 20x 20x    3\" step-ups/downs (forward and lateral) 30x each 30x  3\" step+blue pad, 30x each   Ladder  ( slow marching, side-stepping, diagonals)  4L each     Stdg SLS  Blue pad, 10 x 10sec     Resisted side-stepping   Green, 4L    Stdg CKC TKE BTB, 30x BTB, 30x BTB, 20x BTB, 20x   Gait in clinic without cane       Education    Importance of  HEP/ICE/  NSAIDS 2x daily     L knee AROM: 0-122deg (7-31-17)    Treatment/Session Assessment: Pt was challenged by step-overs using balance pad due to residual proprioceptive deficits. · Response to Treatment: Pt with increased pain with all activities, however, was able to participate in 35min of activity, and subjectively reported less pain and stiffness after exercises.    · Compliance with Program/Exercises: Pt reports compliance with HEP 0x since last PT visit. · Recommendations/Intent for next treatment session:  Continue to progress balance activities and hip/quad strengthening.      Future Appointments  Date Time Provider Мария Rosales   8/4/2017 9:30 AM Julio Cesar Almonte MD [facility]   8/7/2017 9:00 AM Ximena Otto, PT [facility]   8/10/2017 9:00 AM Ximena Otto, PT [facility]   8/14/2017 9:00 AM Ximena Otto PT [facility]   8/17/2017 9:00 AM Ximena Otto, PT [facility]       Total Treatment Duration:   PT Patient Time In/Time Out  Time In: 0910  Time Out: 0945Sierra Kovacs PT

## 2017-08-07 ENCOUNTER — HOSPITAL ENCOUNTER (OUTPATIENT)
Dept: PHYSICAL THERAPY | Age: 67
Discharge: HOME | End: 2017-08-07
Payer: MEDICARE

## 2017-08-07 PROCEDURE — 97110 THERAPEUTIC EXERCISES: CPT | Performed by: PHYSICAL THERAPIST

## 2017-08-10 ENCOUNTER — HOSPITAL ENCOUNTER (OUTPATIENT)
Dept: PHYSICAL THERAPY | Age: 67
Discharge: HOME | End: 2017-08-10
Payer: MEDICARE

## 2017-08-10 PROCEDURE — 97110 THERAPEUTIC EXERCISES: CPT | Performed by: PHYSICAL THERAPIST

## 2017-08-10 NOTE — PROGRESS NOTES
Fidel Mcmahan  : 1950 [id number] at [address] [place] [address], [facility], [address] [place] [address]  [phone]   [phone]         OUTPATIENT PHYSICAL THERAPY:Daily Note 8/10/2017    ICD-10: Treatment Diagnosis: ( R26.2) difficulty walking, (M62.552) muscle atrophy/weakness, (M25.562) L knee pain, (M25.662) stiffness L knee  Precautions/Allergies: Other medication; Sulfa (sulfonamide antibiotics); and Ultram [tramadol] , ointments  Fall Risk Score: 3 (? 5 = High Risk)  MD Orders: Eval and Treat MEDICAL/REFERRING DIAGNOSIS:  Left Total Knee Arthroplasty on 17  DATE OF ONSET: 17  REFERRING PHYSICIAN: Ange Wetzel MD  RETURN PHYSICIAN APPOINTMENT: 17     INITIAL ASSESSMENT:  Ms. Paulina Black presents today w/ functional limitations as a result of s/p L TKA on 17 and is  progressing as anticipated w/ moderate L knee pain, swelling, stiffness, weakness and generalized immobility. Her gait remains antalgic with use of a std cane on the R. She requires UE assistance with sit-stand transfers. She lives alone and will benefit from skilled PT to address the following deficits. PROBLEM LIST (Impacting functional limitations):  1. Decreased Strength  2. Decreased ADL/Functional Activities  3. Decreased Transfer Abilities  4. Decreased Ambulation Ability/Technique  5. Decreased Balance  6. Increased Pain  7. Decreased Activity Tolerance  8. Decreased Flexibility/Joint Mobility  9. Edema/Girth INTERVENTIONS PLANNED:  1. Balance Exercise  2. Cryotherapy  3. Electrical Stimulation  4. Gait Training  5. Home Exercise Program (HEP)  6. Manual Therapy  7. Neuromuscular Re-education/Strengthening  8. Range of Motion (ROM)  9. Therapeutic Exercise/Strengthening  10. Ultrasound (US)  11. Vasopneumatic Cold Compression   TREATMENT PLAN:  Effective Dates: 17 TO 17.   Frequency/Duration: 3 times a week for 12 weeks  GOALS: (Goals have been discussed and agreed upon with patient.)  Short-Term Functional Goals: Time Frame: 6 weeks  1. Pt will be compliant and independent with HEP.----------------------------------------------------------------------------------------------MET  2. Pt will improve score on the LEFS to 55/80 to increase pt's overall functional mobility. -------------------------------MET, pt scored 61/80  3. Pt will improve L knee AROM to 0-120deg to increase pt's ease with transfers and gait.------------------------------------MET, L knee AROM 0-120deg  4. Pt will be able to sit-stand from standard height without use of UE or compensatory weight shifting to rise. ------MET  5. Pt will be able to ambulate with a near normal gait pattern for community distances with LRD or no AD.  --------MET  6. Pt will subjectively report decreased frequency(2-3x/night) of waking due to L knee pain. ---------------------------MET, pt is waking  1-2x/night  7. Pt will be able to ascend 4-6\" steps with good form and control to increase pt's ease with entering her home. ---------MET    Discharge Goals: Time Frame: 12 weeks  1. Pt will improve score on the LEFS to 66/80 to increase pt's overall functional mobility. 2.   Pt will improve FLR from  CK to  CI to increase pt's overall function. 3.   Pt will improve L knee AROM to 0-125deg to increase pt's ease with transfers, gait and balance. 4.   Pt will be able to sit-stand from toilet height w/out use of UE for assistance to rise. 5.   Pt will be able to ascend 6-8\" steps reciprocally and descend 6\" steps reciprocally with use of a rail with good form and control. 6.   Pt will ambulate with a normal gait pattern with no AD to increase pt's overall functional mobility. 7.   Pt will be DC'd from PT to HEP. 8.   Pt will be able to return to gardening, fully caring for her grandchildren and gym activities (cardio and weight machines) with confidence and manageable discomfort.   9.   Pt will only wake 1-2x nightly due to L knee discomfort. Rehabilitation Potential For Stated Goals: Good  Regarding Kristian Domingo's therapy, I certify that the treatment plan above will be carried out by a therapist or under their direction. Thank you for this referral,  Paula Rodas, PT     Referring Physician Signature: Edson Sevilla MD              Date                    The information in this section was collected on 6/30/17 (except where otherwise noted). HISTORY:   History of Present Injury/Illness (Reason for Referral):  Pt reports she's had L knee OA and subsequent pain for several years, however, it worsened ~ 6 months ago, which sent her to Dr. Ward Whalen, which subsequently resulted in her L TKA. Past Medical History/Comorbidities:   Ms. Sharona Roman  has a past medical history of Asthma; Diabetes ([facility] (2012); Hypertension; Morbid obesity ([facility] Thromboembolus ([facility] Thyroid disease; and Unspecified adverse effect of anesthesia. She also has no past medical history of Adverse effect of anesthesia; Difficult intubation; Endocarditis; Malignant hyperthermia due to anesthesia; Nausea & vomiting; Nicotine vapor product user; Non-nicotine vapor product user; Pseudocholinesterase deficiency; or Rheumatic fever. Ms. Sharona Roman  has a past surgical history that includes gyn; neurological procedure unlisted; carpal tunnel release (1984); orthopaedic; appendectomy (1966); heent; refractive surgery (2001); and knee replacement (Left, 06/07/2017). She states she also had a L RCR in 2013. Social History/Living Environment:     Pt reports she lives alone in a 1 story home with 1 4-6\" step to enter. She states she has a son, daughter-in-law and grandchildren who live close to her and check in frequently. She drove herself to PT today. Prior Level of Function/Work/Activity:  Pt reports despite her L knee pain, she worked part-time at [employer] ~ 16 hours/week, and worked out at [employer] x 1 hour 4 x /week.  She denies having to use a cane prior to surgery. Pt states she would like to retun to return to gardening, gym activities and her part-time job upon completion of PT. Current Medications:       Current Outpatient Prescriptions:     HYDROcodone-acetaminophen (NORCO) 5-325 mg per tablet, Take  by mouth every four (4) hours as needed for Pain., Disp: , Rfl:     oxyCODONE IR (ROXICODONE) 5 mg immediate release tablet, Take 1 Tab by mouth every three (3) hours as needed. Max Daily Amount: 40 mg. (Patient taking differently: Take 1 Tab by mouth every three (3) hours as needed for Pain.), Disp: 90 Tab, Rfl: 0    acetaminophen (TYLENOL) 500 mg tablet, Take 2 Tabs by mouth every six (6) hours. , Disp: 120 Tab, Rfl: 0    aspirin 81 mg chewable tablet, Take 1 Tab by mouth two (2) times a day., Disp: 60 Tab, Rfl: 0    cyclobenzaprine (FLEXERIL) 10 mg tablet, Take 0.5 Tabs by mouth three (3) times daily. , Disp: 60 Tab, Rfl: 0    gabapentin (NEURONTIN) 100 mg capsule, Take 1 Cap by mouth two (2) times a day., Disp: 60 Cap, Rfl: 0    zolpidem (AMBIEN) 5 mg tablet, Take 1 Tab by mouth nightly as needed for Sleep. Max Daily Amount: 5 mg., Disp: 30 Tab, Rfl: 0    ergocalciferol (ERGOCALCIFEROL) 50,000 unit capsule, Take 1 Cap by mouth every seven (7) days. Indications: VITAMIN D DEFICIENCY (HIGH DOSE THERAPY) (Patient taking differently: Take 50,000 Units by mouth every seven (7) days. Every Sunday  Indications: VITAMIN D DEFICIENCY (HIGH DOSE THERAPY)), Disp: 12 Cap, Rfl: 2    linagliptin-metformin (JENTADUETO XR) 5-1,000 mg TBph, Take 1 Tab by mouth daily. (Patient taking differently: Take 1 Tab by mouth daily. Indications: type 2 diabetes mellitus), Disp: 90 Tab, Rfl: 3    albuterol (PROVENTIL HFA) 90 mcg/actuation inhaler, Take 1 Puff by inhalation every four (4) hours as needed. (Patient taking differently: Take 1 Puff by inhalation every four (4) hours as needed.  Take morning of surgery per anesthesia guidelines if needed and bring to hospital), Disp: 3 Inhaler, Rfl: 1    montelukast (SINGULAIR) 10 mg tablet, TAKE 1 TABLET DAILY, Disp: 90 Tab, Rfl: 3    levothyroxine (SYNTHROID) 75 mcg tablet, TAKE 1 TABLET DAILY BEFORE BREAKFAST, Disp: 90 Tab, Rfl: 2    hydroCHLOROthiazide (HYDRODIURIL) 25 mg tablet, Take 1 Tab by mouth daily. Indications: am, Disp: 90 Tab, Rfl: 3    amLODIPine-benazepril (LOTREL) 5-10 mg per capsule, Take 1 Cap by mouth daily. , Disp: 90 Cap, Rfl: 3   Date Last Reviewed:  6/30/17   EXAMINATION:   Observation/Orthostatic Postural Assessment:    Atrophy: Pt with moderate atrophy noted in L quad and GS complex compared to the R. Incision: Pt's incision is healing nicely with no signs of infection. No open areas. Palpation: Pt minimally warm to touch with increased swelling and tissue density along medial and posterior L knee  Varsha's Sign: Neg      AROM/Strength:  LE AROM/Strength DATE  6/30/17 DATE  7/3/17   Hip Flexion R: WNL  L: WFL R:   L:    Hip Abduction  R: WFL  L: WFL R:   L:    Hip Extension  R: WFL  L: WFL R:   L:    Knee Flexion  R: WNL  L: 110 R:   L: 120   Knee Extension  R: WNL  L: 6 R:   L: 6   Ankle Dorsiflexion  R: WNL  L:WFL R:   L:   Ankle Plantarflexion  R:WNL  L:WNL R:  L:   Flexibility: HS/Quads/GS R:WNL  L:moderate tightness throughout          Functional Mobility:         Gait/Ambulation:  Pt ambulates with a std cane on the R with decreased step length and stance time on the L. She also demonstrates L knee flexion during swing phase and delayed L HS/TO sequencing. Transfers:  Pt uses B UE, as well as, compensatory weight shifting to the R to rise from a std height chair. Stairs:  Pt ascends/descends 6\" stairs with a step-to gait pattern with use of 1 rail. Balance: Pt with good static balance as observed through activities in the gym today. Dynamic: further testing required.      Standing Heel Raises: Able w/ UE     Balance:           Edema/Girth:  2+    Left Right    Initial Most Recent Initial Most Recent   Lower  Extremity  43.0cm    none           Outcome Measure: Tool Used: Lower Extremity Functional Scale (LEFS)  Score:  Initial: 44/80 Most Recent: 61/80 (Date: 7/28/17)   Interpretation of Score: 20 questions each scored on a 5 point scale with 0 representing \"extreme difficulty or unable to perform\" and 4 representing \"no difficulty\". The lower the score, the greater the functional disability. 80/80 represents no disability. Minimal detectable change is 9 points. Score 80 79-63 62-48 47-32 31-16 15-1 0   Modifier CH CI CJ CK CL CM CN     ? Mobility - Walking and Moving Around:     - CURRENT STATUS: CJ - 20%-39% impaired, limited or restricted    - GOAL STATUS: CI - 1%-19% impaired, limited or restricted    - D/C STATUS:  ---------------To be determined---------------    Medical Necessity:   · Patient is expected to demonstrate progress in strength, range of motion, balance, coordination and functional technique to decrease assistance required with gait, sit-stand transfers and stairs. and increase independence with daily activities allowing her to return to gardening, caring for her grandchildren and gym activities. .  · Patient demonstrates good rehab potential due to higher previous functional level. Reason for Services/Other Comments:  · Patient showed mild signs of depression today. She is very social and feel she will benefit from OP PT to improve gait,balance, coordination, strength, ROM and overall mobility to allow her to return to the gym and other social activties to improve her mental health. .            TREATMENT:     Pre-treatment Symptoms/Complaints: Pt reports greater ease with getting into and out of her car. She reports continued L knee pain and stiffness this week due to the cool, rainy weather.    Visit #14  Pain: Initial:3     Post:3     MANUAL THERAPY: (0min): STM/MFR to L knee to aide with decreasing tissue density/tightness, especially posteriorly and at distal quad/proximal incision, to improve AROM and gait fluidity while decreasing pain. THERAPEUTIC EXERCISE: (45 minutes):  Exercises per grid below to improve mobility and strength. Required moderate visual, verbal and tactile cues to promote proper body alignment, promote wea proper body posture and promote proper body breathing techniques. Progressed repetitions as indicated.      Date:  7/28/17 Date:  7/31/17 Date:  8/3/17 Date:  8/7/17 Date:  8/10/17   Activity/Exercise Parameters Parameters Parameters Parameters Parameters   Nu step L5, 10min L5, 10min L3.5, 10min L3.5, 10min L3, 15min   QS        Heel slides w/ ball        SAQ        SLR        Sit-stand 25x L LE bias, 25x 25x 30x 20x on blue pad   Seated knee Ext     RTB, 20x    Seated HSC    RTB, 20x    Bridging w/ ball        clams        HS/GS stretch w/ strap 3 x 30sec 3 x 30sec 3 x 30sec 3 x 30sec 3 x 30sec   Education        Stairs-ascend/descend reciprocally 8x 10x 6x 6x 8x   8\" box step-overs/obstacle negotiation (forward/lateral)  30x each  20x each 20x   8\" step-ups 30x 15x  10x    8\" step-overs 20x 20x   20x   3\" step-ups/downs (forward and lateral) 30x  3\" step+blue pad, 30x each     Ladder  ( slow marching, side-stepping, diagonals) 4L each   4L each 4L each   Stdg SLS Blue pad, 10 x 10sec    10 x 10sec   Stdg SLS plyotoss activity     2# ball, 30x   Resisted side-stepping  Green, 4L      Stdg CKC TKE BTB, 30x BTB, 20x BTB, 20x     Gait in clinic without cane        Education   Importance of  HEP/ICE/  NSAIDS 2x daily HEP/ICE/  NSAIDS; 2x daily HEP/ICE  NSAIDS; 2x daily     L knee AROM: 2-124deg (8-10-17)    Treatment/Session Assessment:  Pt was challenged by SLS plyotoss activity due to residual L LE weakness and poor balance and proprioception. She requires vc to engage her core and tighten her glutes to improve her balance.    · Response to Treatment: Pt with min medial knee pain with 8\" step-overs today, otherwise he did fairly well with above activities. · Compliance with Program/Exercises: Pt reports compliance with HEP 1-2x since last PT visit. · Recommendations/Intent for next treatment session:  Continue PT to further increase L knee AROM, strength, balance and gait while decreasing pain to increase pt's overall functional mobility. Pt will be re-assessed and likely DC'd next week.      Future Appointments  Date Time Provider Мария Rosales   8/14/2017 9:00 AM Rl Harvey, ARNAV CALDERON   8/17/2017 9:00 AM Rl Harvey, PT JAMIR CALDERON       Total Treatment Duration:   PT Patient Time In/Time Out  Time In: 0900  Time Out: 0950Heather Vani Boyle PT

## 2017-08-14 ENCOUNTER — HOSPITAL ENCOUNTER (OUTPATIENT)
Dept: PHYSICAL THERAPY | Age: 67
Discharge: HOME | End: 2017-08-14
Payer: MEDICARE

## 2017-08-14 PROCEDURE — 97110 THERAPEUTIC EXERCISES: CPT | Performed by: PHYSICAL THERAPIST

## 2017-08-14 NOTE — PROGRESS NOTES
Isi Castelan  : 1950 [id number] at [place] [address] [address] [place] [address], [facility][note 3], [address] [place] [address] Road  [phone]   [phone]         OUTPATIENT PHYSICAL THERAPY:Daily Note 2017    ICD-10: Treatment Diagnosis: ( R26.2) difficulty walking, (M62.552) muscle atrophy/weakness, (M25.562) L knee pain, (M25.662) stiffness L knee  Precautions/Allergies: Other medication; Sulfa (sulfonamide antibiotics); and Ultram [tramadol] , ointments  Fall Risk Score: 3 (? 5 = High Risk)  MD Orders: Eval and Treat MEDICAL/REFERRING DIAGNOSIS:  Left Total Knee Arthroplasty on 17  DATE OF ONSET: 17  REFERRING PHYSICIAN: Cherelle Bush MD  RETURN PHYSICIAN APPOINTMENT: 17     INITIAL ASSESSMENT:  Ms. Estefany Rodriguez presents today w/ functional limitations as a result of s/p L TKA on 17 and is  progressing as anticipated w/ moderate L knee pain, swelling, stiffness, weakness and generalized immobility. Her gait remains antalgic with use of a std cane on the R. She requires UE assistance with sit-stand transfers. She lives alone and will benefit from skilled PT to address the following deficits. PROBLEM LIST (Impacting functional limitations):  1. Decreased Strength  2. Decreased ADL/Functional Activities  3. Decreased Transfer Abilities  4. Decreased Ambulation Ability/Technique  5. Decreased Balance  6. Increased Pain  7. Decreased Activity Tolerance  8. Decreased Flexibility/Joint Mobility  9. Edema/Girth INTERVENTIONS PLANNED:  1. Balance Exercise  2. Cryotherapy  3. Electrical Stimulation  4. Gait Training  5. Home Exercise Program (HEP)  6. Manual Therapy  7. Neuromuscular Re-education/Strengthening  8. Range of Motion (ROM)  9. Therapeutic Exercise/Strengthening  10. Ultrasound (US)  11. Vasopneumatic Cold Compression   TREATMENT PLAN:  Effective Dates: 17 TO 17.   Frequency/Duration: 3 times a week for 12 weeks  GOALS: (Goals have been discussed and agreed upon with patient.)  Short-Term Functional Goals: Time Frame: 6 weeks  1. Pt will be compliant and independent with HEP.----------------------------------------------------------------------------------------------MET  2. Pt will improve score on the LEFS to 55/80 to increase pt's overall functional mobility. -------------------------------MET, pt scored 61/80  3. Pt will improve L knee AROM to 0-120deg to increase pt's ease with transfers and gait.------------------------------------MET, L knee AROM 0-120deg  4. Pt will be able to sit-stand from standard height without use of UE or compensatory weight shifting to rise. ------MET  5. Pt will be able to ambulate with a near normal gait pattern for community distances with LRD or no AD.  --------MET  6. Pt will subjectively report decreased frequency(2-3x/night) of waking due to L knee pain. ---------------------------MET, pt is waking  1-2x/night  7. Pt will be able to ascend 4-6\" steps with good form and control to increase pt's ease with entering her home. ---------MET    Discharge Goals: Time Frame: 12 weeks  1. Pt will improve score on the LEFS to 66/80 to increase pt's overall functional mobility. 2.   Pt will improve FLR from  CK to  CI to increase pt's overall function. 3.   Pt will improve L knee AROM to 0-125deg to increase pt's ease with transfers, gait and balance. 4.   Pt will be able to sit-stand from toilet height w/out use of UE for assistance to rise. 5.   Pt will be able to ascend 6-8\" steps reciprocally and descend 6\" steps reciprocally with use of a rail with good form and control. 6.   Pt will ambulate with a normal gait pattern with no AD to increase pt's overall functional mobility. 7.   Pt will be DC'd from PT to HEP. 8.   Pt will be able to return to gardening, fully caring for her grandchildren and gym activities (cardio and weight machines) with confidence and manageable discomfort.   9.   Pt will only wake 1-2x nightly due to L knee discomfort. Rehabilitation Potential For Stated Goals: Good  Regarding Michaeleen Essex Golaski's therapy, I certify that the treatment plan above will be carried out by a therapist or under their direction. Thank you for this referral,  Felice Jensen, PT     Referring Physician Signature: Karoline Perkins MD              Date                    The information in this section was collected on 6/30/17 (except where otherwise noted). HISTORY:   History of Present Injury/Illness (Reason for Referral):  Pt reports she's had L knee OA and subsequent pain for several years, however, it worsened ~ 6 months ago, which sent her to Dr. Corrinne Griffith, which subsequently resulted in her L TKA. Past Medical History/Comorbidities:   Ms. Allison Ibrahim  has a past medical history of Asthma; Diabetes ([address] (2012); Hypertension; Morbid obesity ([address] Thromboembolus ([address] Thyroid disease; and Unspecified adverse effect of anesthesia. She also has no past medical history of Adverse effect of anesthesia; Difficult intubation; Endocarditis; Malignant hyperthermia due to anesthesia; Nausea & vomiting; Nicotine vapor product user; Non-nicotine vapor product user; Pseudocholinesterase deficiency; or Rheumatic fever. Ms. Allison Ibrahim  has a past surgical history that includes gyn; neurological procedure unlisted; carpal tunnel release (1984); orthopaedic; appendectomy (1966); heent; refractive surgery (2001); and knee replacement (Left, 06/07/2017). She states she also had a L RCR in 2013. Social History/Living Environment:     Pt reports she lives alone in a 1 story home with 1 4-6\" step to enter. She states she has a son, daughter-in-law and grandchildren who live close to her and check in frequently. She drove herself to PT today. Prior Level of Function/Work/Activity:  Pt reports despite her L knee pain, she worked part-time at [employer] ~ 16 hours/week, and worked out at [employer] x 1 hour 4 x /week.  She denies having to use a cane prior to surgery. Pt states she would like to retun to return to gardening, gym activities and her part-time job upon completion of PT. Current Medications:       Current Outpatient Prescriptions:     HYDROcodone-acetaminophen (NORCO) 5-325 mg per tablet, Take  by mouth every four (4) hours as needed for Pain., Disp: , Rfl:     oxyCODONE IR (ROXICODONE) 5 mg immediate release tablet, Take 1 Tab by mouth every three (3) hours as needed. Max Daily Amount: 40 mg. (Patient taking differently: Take 1 Tab by mouth every three (3) hours as needed for Pain.), Disp: 90 Tab, Rfl: 0    acetaminophen (TYLENOL) 500 mg tablet, Take 2 Tabs by mouth every six (6) hours. , Disp: 120 Tab, Rfl: 0    aspirin 81 mg chewable tablet, Take 1 Tab by mouth two (2) times a day., Disp: 60 Tab, Rfl: 0    cyclobenzaprine (FLEXERIL) 10 mg tablet, Take 0.5 Tabs by mouth three (3) times daily. , Disp: 60 Tab, Rfl: 0    gabapentin (NEURONTIN) 100 mg capsule, Take 1 Cap by mouth two (2) times a day., Disp: 60 Cap, Rfl: 0    zolpidem (AMBIEN) 5 mg tablet, Take 1 Tab by mouth nightly as needed for Sleep. Max Daily Amount: 5 mg., Disp: 30 Tab, Rfl: 0    ergocalciferol (ERGOCALCIFEROL) 50,000 unit capsule, Take 1 Cap by mouth every seven (7) days. Indications: VITAMIN D DEFICIENCY (HIGH DOSE THERAPY) (Patient taking differently: Take 50,000 Units by mouth every seven (7) days. Every Sunday  Indications: VITAMIN D DEFICIENCY (HIGH DOSE THERAPY)), Disp: 12 Cap, Rfl: 2    linagliptin-metformin (JENTADUETO XR) 5-1,000 mg TBph, Take 1 Tab by mouth daily. (Patient taking differently: Take 1 Tab by mouth daily. Indications: type 2 diabetes mellitus), Disp: 90 Tab, Rfl: 3    albuterol (PROVENTIL HFA) 90 mcg/actuation inhaler, Take 1 Puff by inhalation every four (4) hours as needed. (Patient taking differently: Take 1 Puff by inhalation every four (4) hours as needed.  Take morning of surgery per anesthesia guidelines if needed and bring to hospital), Disp: 3 Inhaler, Rfl: 1    montelukast (SINGULAIR) 10 mg tablet, TAKE 1 TABLET DAILY, Disp: 90 Tab, Rfl: 3    levothyroxine (SYNTHROID) 75 mcg tablet, TAKE 1 TABLET DAILY BEFORE BREAKFAST, Disp: 90 Tab, Rfl: 2    hydroCHLOROthiazide (HYDRODIURIL) 25 mg tablet, Take 1 Tab by mouth daily. Indications: am, Disp: 90 Tab, Rfl: 3    amLODIPine-benazepril (LOTREL) 5-10 mg per capsule, Take 1 Cap by mouth daily. , Disp: 90 Cap, Rfl: 3   Date Last Reviewed:  6/30/17   EXAMINATION:   Observation/Orthostatic Postural Assessment:    Atrophy: Pt with moderate atrophy noted in L quad and GS complex compared to the R. Incision: Pt's incision is healing nicely with no signs of infection. No open areas. Palpation: Pt minimally warm to touch with increased swelling and tissue density along medial and posterior L knee  Varsha's Sign: Neg      AROM/Strength:  LE AROM/Strength DATE  6/30/17 DATE  7/3/17   Hip Flexion R: WNL  L: WFL R:   L:    Hip Abduction  R: WFL  L: WFL R:   L:    Hip Extension  R: WFL  L: WFL R:   L:    Knee Flexion  R: WNL  L: 110 R:   L: 120   Knee Extension  R: WNL  L: 6 R:   L: 6   Ankle Dorsiflexion  R: WNL  L:WFL R:   L:   Ankle Plantarflexion  R:WNL  L:WNL R:  L:   Flexibility: HS/Quads/GS R:WNL  L:moderate tightness throughout          Functional Mobility:         Gait/Ambulation:  Pt ambulates with a std cane on the R with decreased step length and stance time on the L. She also demonstrates L knee flexion during swing phase and delayed L HS/TO sequencing. Transfers:  Pt uses B UE, as well as, compensatory weight shifting to the R to rise from a std height chair. Stairs:  Pt ascends/descends 6\" stairs with a step-to gait pattern with use of 1 rail. Balance: Pt with good static balance as observed through activities in the gym today. Dynamic: further testing required.      Standing Heel Raises: Able w/ UE     Balance:           Edema/Girth:  2+    Left Right    Initial Most Recent Initial Most Recent   Lower  Extremity  43.0cm    none           Outcome Measure: Tool Used: Lower Extremity Functional Scale (LEFS)  Score:  Initial: 44/80 Most Recent: 61/80 (Date: 7/28/17)   Interpretation of Score: 20 questions each scored on a 5 point scale with 0 representing \"extreme difficulty or unable to perform\" and 4 representing \"no difficulty\". The lower the score, the greater the functional disability. 80/80 represents no disability. Minimal detectable change is 9 points. Score 80 79-63 62-48 47-32 31-16 15-1 0   Modifier CH CI CJ CK CL CM CN     ? Mobility - Walking and Moving Around:     - CURRENT STATUS: CJ - 20%-39% impaired, limited or restricted    - GOAL STATUS: CI - 1%-19% impaired, limited or restricted    - D/C STATUS:  ---------------To be determined---------------    Medical Necessity:   · Patient is expected to demonstrate progress in strength, range of motion, balance, coordination and functional technique to decrease assistance required with gait, sit-stand transfers and stairs. and increase independence with daily activities allowing her to return to gardening, caring for her grandchildren and gym activities. .  · Patient demonstrates good rehab potential due to higher previous functional level. Reason for Services/Other Comments:  · Patient showed mild signs of depression today. She is very social and feel she will benefit from OP PT to improve gait,balance, coordination, strength, ROM and overall mobility to allow her to return to the gym and other social activties to improve her mental health. .            TREATMENT:     Pre-treatment Symptoms/Complaints: Pt reports a few \"twinges\" in her knee over the weekend, but for the most part, states her  L knee is feeling much better than the right.    Visit #15  Pain: Initial:3     Post:2     MANUAL THERAPY: (0min): STM/MFR to L knee to aide with decreasing tissue density/tightness, especially posteriorly and at distal quad/proximal incision, to improve AROM and gait fluidity while decreasing pain. THERAPEUTIC EXERCISE: (45 minutes):  Exercises per grid below to improve mobility and strength. Required moderate visual, verbal and tactile cues to promote proper body alignment, promote wea proper body posture and promote proper body breathing techniques. Progressed repetitions as indicated.      Date:  7/31/17 Date:  8/3/17 Date:  8/7/17 Date:  8/10/17 Date:  8/14/17   Activity/Exercise Parameters Parameters Parameters Parameters Parameters   Nu step L5, 10min L3.5, 10min L3.5, 10min L3, 15min L3, 10min   QS        Heel slides w/ ball        SAQ        SLR        Sit-stand L LE bias, 25x 25x 30x 20x on blue pad 20x on blue pad   Seated knee Ext    RTB, 20x     Seated HSC   RTB, 20x     Bridging w/ ball        clams        HS/GS stretch w/ strap 3 x 30sec 3 x 30sec 3 x 30sec 3 x 30sec 5 x 30sec   Education        Stairs-ascend/descend reciprocally 10x 6x 6x 8x 8x   8\" box step-overs/obstacle negotiation (forward/lateral) 30x each  20x each 20x 20x   8\" step-ups 15x  10x     8\" step-overs 20x   20x 20x   3\" step-ups/downs (forward and lateral)  3\" step+blue pad, 30x each      Ladder  ( slow marching, side-stepping, diagonals)   4L each 4L each 4L each   Stdg SLS    10 x 10sec    Stdg SLS plyotoss activity    2# ball, 30x    Resisted side-stepping Green, 4L    Green 4L   Stdg CKC TKE BTB, 20x BTB, 20x      Gait in clinic without cane        Education  Importance of  HEP/ICE/  NSAIDS 2x daily HEP/ICE/  NSAIDS; 2x daily HEP/ICE  NSAIDS; 2x daily HEP/ICE 2x daily     L knee AROM: 1-128deg (8-14-17)  R knee AROM: 5-130deg (8-14-17)    Treatment/Session Assessment: Pt's L knee AROM continues to slowly improve as noted above. Pt remains challenged by resisted side-stepping, as well as, marching due to residual B hip weakness and pt's lack of body awareness.  Marching with high knees is challenging as pt must be reminded to widen her TOBIN to maintain balance and avoid crossing legs/midline with LOB. Pt is progressing toward current goals. · Response to Treatment: Pt with improved form and control with 8\" stair descent, as well as, lateral 8\" step-overs due to improving L LE strength. · Compliance with Program/Exercises: Pt reports compliance with HEP 2x since last PT visit. · Recommendations/Intent for next treatment session:  Review HEP, Re-assess pt. And likely DC to HEP. Pt given LEFS to fill out and return next visit.      Future Appointments  Date Time Provider Мария Rosales   8/17/2017 9:00 AM Russel Santos, [facility]       Total Treatment Duration:   PT Patient Time In/Time Out  Time In: 0900  Time Out: 0950Heamohinder Ledezma, PT

## 2017-08-17 ENCOUNTER — HOSPITAL ENCOUNTER (OUTPATIENT)
Dept: PHYSICAL THERAPY | Age: 67
Discharge: HOME | End: 2017-08-17
Payer: MEDICARE

## 2017-08-17 PROCEDURE — 97110 THERAPEUTIC EXERCISES: CPT | Performed by: PHYSICAL THERAPIST

## 2017-08-17 PROCEDURE — G8980 MOBILITY D/C STATUS: HCPCS | Performed by: PHYSICAL THERAPIST

## 2017-08-17 PROCEDURE — G8978 MOBILITY CURRENT STATUS: HCPCS | Performed by: PHYSICAL THERAPIST

## 2017-08-17 NOTE — PROGRESS NOTES
Maximo Britt  : 1950 [id number] at [address], [address] [place] [address], [facility], [address] [place] [address]  [phone]   [phone]         OUTPATIENT PHYSICAL THERAPY:Discharge 2017    ICD-10: Treatment Diagnosis: ( R26.2) difficulty walking, (M62.552) muscle atrophy/weakness, (M25.562) L knee pain, (M25.662) stiffness L knee  Precautions/Allergies: Other medication; Sulfa (sulfonamide antibiotics); and Ultram [tramadol] , ointments  Fall Risk Score: 3 (? 5 = High Risk)  MD Orders: Eval and Treat MEDICAL/REFERRING DIAGNOSIS:  Left Total Knee Arthroplasty on 17  DATE OF ONSET: 17  REFERRING PHYSICIAN: Joshua Hanson MD  RETURN PHYSICIAN APPOINTMENT: 17    ASSESSMENT:  Ms. Melo Carranza has done well with PT, meeting all short and long term goals listed below. Her L knee AROM and strength is WNL. She ambulates with a N gait pattern with no AD, and can sit-stand from toilet height with good form and control. She ascends/descends 8\" stairs reciprocally with and without an AD. She scored a 70/80 on the LEFS with 88% function. Pt is compliant and independent with HEP. No further PT needs at this time. TREATMENT PLAN:  Effective Dates: 17 TO 17. Frequency/Duration: 3 times a week for 12 weeks  GOALS: (Goals have been discussed and agreed upon with patient.)  Short-Term Functional Goals: Time Frame: 6 weeks  1. Pt will be compliant and independent with HEP.----------------------------------------------------------------------------------------------MET  2. Pt will improve score on the LEFS to 55/80 to increase pt's overall functional mobility. -------------------------------MET, pt scored 61/80  3. Pt will improve L knee AROM to 0-120deg to increase pt's ease with transfers and gait.------------------------------------MET, L knee AROM 0-120deg  4.  Pt will be able to sit-stand from standard height without use of UE or compensatory weight shifting to rise. ------MET  5. Pt will be able to ambulate with a near normal gait pattern for community distances with LRD or no AD.  --------MET  6. Pt will subjectively report decreased frequency(2-3x/night) of waking due to L knee pain. ---------------------------MET, pt is waking  1-2x/night  7. Pt will be able to ascend 4-6\" steps with good form and control to increase pt's ease with entering her home. ---------MET    Discharge Goals: Time Frame: 12 weeks  1. Pt will improve score on the LEFS to 66/80 to increase pt's overall functional mobility.-----------------------------------------------------------------MET, pt scored 70/80  2. Pt will improve FLR from  CK to  CI to increase pt's overall function.---------------------------------------------------------------------------------MET , FLR -AF  3. Pt will improve L knee AROM to 0-125deg to increase pt's ease with transfers, gait and balance. --------------------------------------------------------------MET; see below  4. Pt will be able to sit-stand from toilet height w/out use of UE for assistance to rise.----------------------------------------------------------------------------------MET  5. Pt will be able to ascend 6-8\" steps reciprocally and descend 6\" steps reciprocally with use of a rail with good form and control. -----------------------------------MET  6. Pt will ambulate with a normal gait pattern with no AD to increase pt's overall functional mobility.------------------------------------------------------------------------------------MET  7. Pt will be DC'd from PT to HEP.--------------------------------------------------------------------------------------------------------------------------------------------------------------------------------------MET  8. Pt will be able to return to gardening, fully caring for her grandchildren and gym activities (cardio and weight machines) with confidence and manageable discomfort. MET  9. Pt will only wake 1-2x nightly due to L knee discomfort. -----------------------------------------------------------------------------------------------------------------------------------------------------------MET  Rehabilitation Potential For Stated Goals: Good  Regarding Kieran Domingo's therapy, I certify that the treatment plan above will be carried out by a therapist or under their direction. Thank you for this referral,  Tuyet Antonio PT               The information in this section was collected on 6/30/17 (except where otherwise noted). HISTORY:   History of Present Injury/Illness (Reason for Referral):  Pt reports she's had L knee OA and subsequent pain for several years, however, it worsened ~ 6 months ago, which sent her to Dr. Ashley Martin, which subsequently resulted in her L TKA. Past Medical History/Comorbidities:   Ms. Valentino Johnson  has a past medical history of Asthma; Diabetes ([address] (2012); Hypertension; Morbid obesity ([facility] [address] Thromboembolus ([facility] [address] Thyroid disease; and Unspecified adverse effect of anesthesia. She also has no past medical history of Adverse effect of anesthesia; Difficult intubation; Endocarditis; Malignant hyperthermia due to anesthesia; Nausea & vomiting; Nicotine vapor product user; Non-nicotine vapor product user; Pseudocholinesterase deficiency; or Rheumatic fever. Ms. Valentino Johnson  has a past surgical history that includes gyn; neurological procedure unlisted; carpal tunnel release (1984); orthopaedic; appendectomy (1966); heent; refractive surgery (2001); and knee replacement (Left, 06/07/2017). She states she also had a L RCR in 2013. Social History/Living Environment:     Pt reports she lives alone in a 1 story home with 1 4-6\" step to enter. She states she has a son, daughter-in-law and grandchildren who live close to her and check in frequently. She drove herself to PT today.   Prior Level of Function/Work/Activity:  Pt reports despite her L knee pain, she worked part-time at [employer] ~ 16 hours/week, and worked out at [employer] x 1 hour 4 x /week. She denies having to use a cane prior to surgery. Pt states she would like to retun to return to gardening, gym activities and her part-time job upon completion of PT. Current Medications:       Current Outpatient Prescriptions:     HYDROcodone-acetaminophen (NORCO) 5-325 mg per tablet, Take  by mouth every four (4) hours as needed for Pain., Disp: , Rfl:     oxyCODONE IR (ROXICODONE) 5 mg immediate release tablet, Take 1 Tab by mouth every three (3) hours as needed. Max Daily Amount: 40 mg. (Patient taking differently: Take 1 Tab by mouth every three (3) hours as needed for Pain.), Disp: 90 Tab, Rfl: 0    acetaminophen (TYLENOL) 500 mg tablet, Take 2 Tabs by mouth every six (6) hours. , Disp: 120 Tab, Rfl: 0    aspirin 81 mg chewable tablet, Take 1 Tab by mouth two (2) times a day., Disp: 60 Tab, Rfl: 0    cyclobenzaprine (FLEXERIL) 10 mg tablet, Take 0.5 Tabs by mouth three (3) times daily. , Disp: 60 Tab, Rfl: 0    gabapentin (NEURONTIN) 100 mg capsule, Take 1 Cap by mouth two (2) times a day., Disp: 60 Cap, Rfl: 0    zolpidem (AMBIEN) 5 mg tablet, Take 1 Tab by mouth nightly as needed for Sleep. Max Daily Amount: 5 mg., Disp: 30 Tab, Rfl: 0    ergocalciferol (ERGOCALCIFEROL) 50,000 unit capsule, Take 1 Cap by mouth every seven (7) days. Indications: VITAMIN D DEFICIENCY (HIGH DOSE THERAPY) (Patient taking differently: Take 50,000 Units by mouth every seven (7) days. Every Sunday  Indications: VITAMIN D DEFICIENCY (HIGH DOSE THERAPY)), Disp: 12 Cap, Rfl: 2    linagliptin-metformin (JENTADUETO XR) 5-1,000 mg TBph, Take 1 Tab by mouth daily. (Patient taking differently: Take 1 Tab by mouth daily. Indications: type 2 diabetes mellitus), Disp: 90 Tab, Rfl: 3    albuterol (PROVENTIL HFA) 90 mcg/actuation inhaler, Take 1 Puff by inhalation every four (4) hours as needed.  (Patient taking differently: Take 1 Puff by inhalation every four (4) hours as needed. Take morning of surgery per anesthesia guidelines if needed and bring to hospital), Disp: 3 Inhaler, Rfl: 1    montelukast (SINGULAIR) 10 mg tablet, TAKE 1 TABLET DAILY, Disp: 90 Tab, Rfl: 3    levothyroxine (SYNTHROID) 75 mcg tablet, TAKE 1 TABLET DAILY BEFORE BREAKFAST, Disp: 90 Tab, Rfl: 2    hydroCHLOROthiazide (HYDRODIURIL) 25 mg tablet, Take 1 Tab by mouth daily. Indications: am, Disp: 90 Tab, Rfl: 3    amLODIPine-benazepril (LOTREL) 5-10 mg per capsule, Take 1 Cap by mouth daily. , Disp: 90 Cap, Rfl: 3   Date Last Reviewed:  6/30/17   EXAMINATION:   Observation/Orthostatic Postural Assessment:    Atrophy: Pt with moderate atrophy noted in L quad and GS complex compared to the R. Incision: Pt's incision is healing nicely with no signs of infection. No open areas. Palpation: Pt minimally warm to touch with increased swelling and tissue density along medial and posterior L knee  Varsha's Sign: Neg      AROM/Strength:  LE AROM/Strength DATE  6/30/17 DATE  7/3/17 DATE  8/17/17   Hip Flexion R: WNL  L: WFL R:   L:     Hip Abduction  R: WFL  L: WFL R:   L:     Hip Extension  R: WFL  L: WFL R:   L:     Knee Flexion  R: WNL  L: 110 R:   L: 120 L: 128; 3+/5   Knee Extension  R: WNL  L: 6 R:   L: 6 L: 0; 4/5   Ankle Dorsiflexion  R: WNL  L:WFL R:   L:    Ankle Plantarflexion  R:WNL  L:WNL R:  L:    Flexibility: HS/Quads/GS R:WNL  L:moderate tightness throughout           Functional Mobility:         Gait/Ambulation:  Pt ambulates with a std cane on the R with decreased step length and stance time on the L. She also demonstrates L knee flexion during swing phase and delayed L HS/TO sequencing. Transfers:  Pt uses B UE, as well as, compensatory weight shifting to the R to rise from a std height chair. Stairs:  Pt ascends/descends 6\" stairs with a step-to gait pattern with use of 1 rail. Balance: Pt with good static balance as observed through activities in the gym today.  Dynamic: further testing required. Standing Heel Raises: Able w/ UE     Balance:           Edema/Girth:  2+    Left Right    Initial Most Recent Initial Most Recent   Lower  Extremity  43.0cm    none           Outcome Measure: Tool Used: Lower Extremity Functional Scale (LEFS)  Score:  Initial: 44/80 Most Recent: 70/80=12% disability (Date: 8/1717)   Interpretation of Score: 20 questions each scored on a 5 point scale with 0 representing \"extreme difficulty or unable to perform\" and 4 representing \"no difficulty\". The lower the score, the greater the functional disability. 80/80 represents no disability. Minimal detectable change is 9 points. Score 80 79-63 62-48 47-32 31-16 15-1 0   Modifier CH CI CJ CK CL CM CN     ? Mobility - Walking and Moving Around:     - CURRENT STATUS: CI - 1%-19% impaired, limited or restricted    - GOAL STATUS: CI - 1%-19% impaired, limited or restricted    - D/C STATUS:  CI - 1%-19% impaired, limited or restricted    Medical Necessity:   · Patient is expected to demonstrate progress in strength, range of motion, balance, coordination and functional technique to decrease assistance required with gait, sit-stand transfers and stairs. and increase independence with daily activities allowing her to return to gardening, caring for her grandchildren and gym activities. .  · Patient demonstrates good rehab potential due to higher previous functional level. Reason for Services/Other Comments:  · Patient showed mild signs of depression today. She is very social and feel she will benefit from OP PT to improve gait,balance, coordination, strength, ROM and overall mobility to allow her to return to the gym and other social activties to improve her mental health. .            TREATMENT:     Pre-treatment Symptoms/Complaints: Pt reports L knee stiffness and soreness continues, but improves every day.    Visit #16  Pain: Initial:2     Post:2     MANUAL THERAPY: (0min): STM/MFR to L knee to aide with decreasing tissue density/tightness, especially posteriorly and at distal quad/proximal incision, to improve AROM and gait fluidity while decreasing pain. THERAPEUTIC EXERCISE: (35 minutes):  Exercises per grid below to improve mobility and strength. Required moderate visual, verbal and tactile cues to promote proper body alignment, promote wea proper body posture and promote proper body breathing techniques. Progressed repetitions as indicated.      Date:  7/31/17 Date:  8/3/17 Date:  8/7/17 Date:  8/10/17 Date:  8/14/17 Date:  8/17/17   Activity/Exercise Parameters Parameters Parameters Parameters Parameters Parameters   Nu step L5, 10min L3.5, 10min L3.5, 10min L3, 15min L3, 10min L3, 10min   QS         Heel slides w/ ball         SAQ         SLR         Sit-stand L LE bias, 25x 25x 30x 20x on blue pad 20x on blue pad 20x on blue pad   Seated knee Ext    RTB, 20x      Seated HSC   RTB, 20x      Bridging w/ ball         clams         HS/GS stretch w/ strap 3 x 30sec 3 x 30sec 3 x 30sec 3 x 30sec 5 x 30sec 5 x 30sec   Education         Stairs-ascend/descend reciprocally 10x 6x 6x 8x 8x 6x   8\" box step-overs/obstacle negotiation (forward/lateral) 30x each  20x each 20x 20x 20x   8\" step-ups 15x  10x      8\" step-overs 20x   20x 20x 20x   3\" step-ups/downs (forward and lateral)  3\" step+blue pad, 30x each       Ladder  ( slow marching, side-stepping, diagonals)   4L each 4L each 4L each    Stdg SLS    10 x 10sec     Stdg SLS plyotoss activity    2# ball, 30x     Resisted side-stepping Green, 4L    Green 4L    Stdg CKC TKE BTB, 20x BTB, 20x       Gait in clinic without cane         Education  Importance of  HEP/ICE/  NSAIDS 2x daily HEP/ICE/  NSAIDS; 2x daily HEP/ICE  NSAIDS; 2x daily HEP/ICE 2x daily HEP/ICE 2x daily     L knee AROM: 0-128deg (8-17-17)  R knee AROM: 5-130deg (8-17-17)    Treatment/Session Assessment: Pt has done well with PT, meeting all short and long term goals listed below.  Her L knee AROM and strength is WNL. She is able to ascend/desend 8\" steps reciprocally and sit-stand from toilet height. She reports L knee pain is minimal, and she no longer wakes during the night due to pain. She is compliant and independent with her HEP. No further PT needs at this time. · Response to Treatment: Pt did well with above exercises with no increased c/o pain. · Compliance with Program/Exercises: Pt reports compliance with HEP 2x since last PT visit. · Recommendations: Pt is DC'd from PT to HEP at this time. No future appointments.     Total Treatment Duration:   PT Patient Time In/Time Out  Time In: 0900  Time Out: 0945Sierra Crane PT

## 2020-01-22 NOTE — PROGRESS NOTES
Chuck Heads  : 1950 [address] at [place] [address] [address] [facility]", [facility], [address] [place] [address]  [phone]   [phone]         OUTPATIENT PHYSICAL THERAPY:Daily Note 2017    ICD-10: Treatment Diagnosis: ( R26.2) difficulty walking, (M62.552) muscle atrophy/weakness, (M25.562) L knee pain, (M25.662) stiffness L knee  Precautions/Allergies: Other medication; Sulfa (sulfonamide antibiotics); and Ultram [tramadol] , ointments  Fall Risk Score: 3 (? 5 = High Risk)  MD Orders: Eval and Treat MEDICAL/REFERRING DIAGNOSIS:  Left Total Knee Arthroplasty on 17  DATE OF ONSET: 17  REFERRING PHYSICIAN: Jesús Chacon MD  RETURN PHYSICIAN APPOINTMENT: 17     INITIAL ASSESSMENT:  Ms. Prashant Mercedes presents today w/ functional limitations as a result of s/p L TKA on 17 and is  progressing as anticipated w/ moderate L knee pain, swelling, stiffness, weakness and generalized immobility. Her gait remains antalgic with use of a std cane on the R. She requires UE assistance with sit-stand transfers. She lives alone and will benefit from skilled PT to address the following deficits. PROBLEM LIST (Impacting functional limitations):  1. Decreased Strength  2. Decreased ADL/Functional Activities  3. Decreased Transfer Abilities  4. Decreased Ambulation Ability/Technique  5. Decreased Balance  6. Increased Pain  7. Decreased Activity Tolerance  8. Decreased Flexibility/Joint Mobility  9. Edema/Girth INTERVENTIONS PLANNED:  1. Balance Exercise  2. Cryotherapy  3. Electrical Stimulation  4. Gait Training  5. Home Exercise Program (HEP)  6. Manual Therapy  7. Neuromuscular Re-education/Strengthening  8. Range of Motion (ROM)  9. Therapeutic Exercise/Strengthening  10. Ultrasound (US)  11. Vasopneumatic Cold Compression   TREATMENT PLAN:  Effective Dates: 17 TO 17.   Frequency/Duration: 3 times a week for 12 weeks  GOALS: (Goals have been discussed and agreed upon with patient.)  Short-Term Functional Goals: Time Frame: 6 weeks  1. Pt will be compliant and independent with HEP.----------------------------------------------------------------------------------------------MET  2. Pt will improve score on the LEFS to 55/80 to increase pt's overall functional mobility. -------------------------------MET, pt scored 61/80  3. Pt will improve L knee AROM to 0-120deg to increase pt's ease with transfers and gait.------------------------------------MET, L knee AROM 0-120deg  4. Pt will be able to sit-stand from standard height without use of UE or compensatory weight shifting to rise. ------MET  5. Pt will be able to ambulate with a near normal gait pattern for community distances with LRD or no AD.  --------MET  6. Pt will subjectively report decreased frequency(2-3x/night) of waking due to L knee pain. ---------------------------MET, pt is waking  1-2x/night  7. Pt will be able to ascend 4-6\" steps with good form and control to increase pt's ease with entering her home. ---------MET    Discharge Goals: Time Frame: 12 weeks  1. Pt will improve score on the LEFS to 66/80 to increase pt's overall functional mobility. 2.   Pt will improve FLR from  CK to  CI to increase pt's overall function. 3.   Pt will improve L knee AROM to 0-125deg to increase pt's ease with transfers, gait and balance. 4.   Pt will be able to sit-stand from toilet height w/out use of UE for assistance to rise. 5.   Pt will be able to ascend 6-8\" steps reciprocally and descend 6\" steps reciprocally with use of a rail with good form and control. 6.   Pt will ambulate with a normal gait pattern with no AD to increase pt's overall functional mobility. 7.   Pt will be DC'd from PT to HEP. 8.   Pt will be able to return to gardening, fully caring for her grandchildren and gym activities (cardio and weight machines) with confidence and manageable discomfort.   9.   Pt will only wake 1-2x nightly due to L knee discomfort. Rehabilitation Potential For Stated Goals: Good  Regarding Shoshana Domingo's therapy, I certify that the treatment plan above will be carried out by a therapist or under their direction. Thank you for this referral,  Anoop Ruiz, PT     Referring Physician Signature: Chantelle Villa MD              Date                    The information in this section was collected on 6/30/17 (except where otherwise noted). HISTORY:   History of Present Injury/Illness (Reason for Referral):  Pt reports she's had L knee OA and subsequent pain for several years, however, it worsened ~ 6 months ago, which sent her to Dr. Felipe Early, which subsequently resulted in her L TKA. Past Medical History/Comorbidities:   Ms. Johan Joseph  has a past medical history of Asthma; Diabetes ([facility] [address] (2012); Hypertension; Morbid obesity ([facility] [address] Thromboembolus ([facility] [address] [facility] Thyroid disease; and Unspecified adverse effect of anesthesia. She also has no past medical history of Adverse effect of anesthesia; Difficult intubation; Endocarditis; Malignant hyperthermia due to anesthesia; Nausea & vomiting; Nicotine vapor product user; Non-nicotine vapor product user; Pseudocholinesterase deficiency; or Rheumatic fever. Ms. Johan Joseph  has a past surgical history that includes gyn; neurological procedure unlisted; carpal tunnel release (1984); orthopaedic; appendectomy (1966); heent; refractive surgery (2001); and knee replacement (Left, 06/07/2017). She states she also had a L RCR in 2013. Social History/Living Environment:     Pt reports she lives alone in a 1 story home with 1 4-6\" step to enter. She states she has a son, daughter-in-law and grandchildren who live close to her and check in frequently. She drove herself to PT today. Prior Level of Function/Work/Activity:  Pt reports despite her L knee pain, she worked part-time at [employer] ~ 16 hours/week, and worked out at [employer] x 1 hour 4 x /week.  She denies having to use a cane prior to surgery. Pt states she would like to retun to return to gardening, gym activities and her part-time job upon completion of PT. Current Medications:       Current Outpatient Prescriptions:     HYDROcodone-acetaminophen (NORCO) 5-325 mg per tablet, Take  by mouth every four (4) hours as needed for Pain., Disp: , Rfl:     oxyCODONE IR (ROXICODONE) 5 mg immediate release tablet, Take 1 Tab by mouth every three (3) hours as needed. Max Daily Amount: 40 mg. (Patient taking differently: Take 1 Tab by mouth every three (3) hours as needed for Pain.), Disp: 90 Tab, Rfl: 0    acetaminophen (TYLENOL) 500 mg tablet, Take 2 Tabs by mouth every six (6) hours. , Disp: 120 Tab, Rfl: 0    aspirin 81 mg chewable tablet, Take 1 Tab by mouth two (2) times a day., Disp: 60 Tab, Rfl: 0    cyclobenzaprine (FLEXERIL) 10 mg tablet, Take 0.5 Tabs by mouth three (3) times daily. , Disp: 60 Tab, Rfl: 0    gabapentin (NEURONTIN) 100 mg capsule, Take 1 Cap by mouth two (2) times a day., Disp: 60 Cap, Rfl: 0    zolpidem (AMBIEN) 5 mg tablet, Take 1 Tab by mouth nightly as needed for Sleep. Max Daily Amount: 5 mg., Disp: 30 Tab, Rfl: 0    ergocalciferol (ERGOCALCIFEROL) 50,000 unit capsule, Take 1 Cap by mouth every seven (7) days. Indications: VITAMIN D DEFICIENCY (HIGH DOSE THERAPY) (Patient taking differently: Take 50,000 Units by mouth every seven (7) days. Every Sunday  Indications: VITAMIN D DEFICIENCY (HIGH DOSE THERAPY)), Disp: 12 Cap, Rfl: 2    linagliptin-metformin (JENTADUETO XR) 5-1,000 mg TBph, Take 1 Tab by mouth daily. (Patient taking differently: Take 1 Tab by mouth daily. Indications: type 2 diabetes mellitus), Disp: 90 Tab, Rfl: 3    albuterol (PROVENTIL HFA) 90 mcg/actuation inhaler, Take 1 Puff by inhalation every four (4) hours as needed. (Patient taking differently: Take 1 Puff by inhalation every four (4) hours as needed.  Take morning of surgery per anesthesia guidelines if needed and bring to hospital), Disp: 3 Inhaler, Rfl: 1    montelukast (SINGULAIR) 10 mg tablet, TAKE 1 TABLET DAILY, Disp: 90 Tab, Rfl: 3    levothyroxine (SYNTHROID) 75 mcg tablet, TAKE 1 TABLET DAILY BEFORE BREAKFAST, Disp: 90 Tab, Rfl: 2    hydroCHLOROthiazide (HYDRODIURIL) 25 mg tablet, Take 1 Tab by mouth daily. Indications: am, Disp: 90 Tab, Rfl: 3    amLODIPine-benazepril (LOTREL) 5-10 mg per capsule, Take 1 Cap by mouth daily. , Disp: 90 Cap, Rfl: 3   Date Last Reviewed:  6/30/17   EXAMINATION:   Observation/Orthostatic Postural Assessment:    Atrophy: Pt with moderate atrophy noted in L quad and GS complex compared to the R. Incision: Pt's incision is healing nicely with no signs of infection. No open areas. Palpation: Pt minimally warm to touch with increased swelling and tissue density along medial and posterior L knee  Varsha's Sign: Neg      AROM/Strength:  LE AROM/Strength DATE  6/30/17 DATE  7/3/17   Hip Flexion R: WNL  L: WFL R:   L:    Hip Abduction  R: WFL  L: WFL R:   L:    Hip Extension  R: WFL  L: WFL R:   L:    Knee Flexion  R: WNL  L: 110 R:   L: 120   Knee Extension  R: WNL  L: 6 R:   L: 6   Ankle Dorsiflexion  R: WNL  L:WFL R:   L:   Ankle Plantarflexion  R:WNL  L:WNL R:  L:   Flexibility: HS/Quads/GS R:WNL  L:moderate tightness throughout          Functional Mobility:         Gait/Ambulation:  Pt ambulates with a std cane on the R with decreased step length and stance time on the L. She also demonstrates L knee flexion during swing phase and delayed L HS/TO sequencing. Transfers:  Pt uses B UE, as well as, compensatory weight shifting to the R to rise from a std height chair. Stairs:  Pt ascends/descends 6\" stairs with a step-to gait pattern with use of 1 rail. Balance: Pt with good static balance as observed through activities in the gym today. Dynamic: further testing required.      Standing Heel Raises: Able w/ UE     Balance:           Edema/Girth:  2+    Left Right    Initial Most Recent Initial Most Recent   Lower  Extremity  43.0cm    none           Outcome Measure: Tool Used: Lower Extremity Functional Scale (LEFS)  Score:  Initial: 44/80 Most Recent: 61/80 (Date: 7/28/17)   Interpretation of Score: 20 questions each scored on a 5 point scale with 0 representing \"extreme difficulty or unable to perform\" and 4 representing \"no difficulty\". The lower the score, the greater the functional disability. 80/80 represents no disability. Minimal detectable change is 9 points. Score 80 79-63 62-48 47-32 31-16 15-1 0   Modifier CH CI CJ CK CL CM CN     ? Mobility - Walking and Moving Around:     - CURRENT STATUS: CJ - 20%-39% impaired, limited or restricted    - GOAL STATUS: CI - 1%-19% impaired, limited or restricted    - D/C STATUS:  ---------------To be determined---------------    Medical Necessity:   · Patient is expected to demonstrate progress in strength, range of motion, balance, coordination and functional technique to decrease assistance required with gait, sit-stand transfers and stairs. and increase independence with daily activities allowing her to return to gardening, caring for her grandchildren and gym activities. .  · Patient demonstrates good rehab potential due to higher previous functional level. Reason for Services/Other Comments:  · Patient showed mild signs of depression today. She is very social and feel she will benefit from OP PT to improve gait,balance, coordination, strength, ROM and overall mobility to allow her to return to the gym and other social activties to improve her mental health. .            TREATMENT:     Pre-treatment Symptoms/Complaints:   Pt reports she's not feeling well today, and she's having more L knee pain, stiffness and weakness today due to the cool weather. She reports she saw PCP Friday due to her ongoing symptoms of anxiety and depression.    Visit #13  Pain: Initial:7     Post:5     MANUAL THERAPY: (0min): STM/MFR to L knee to aide with decreasing tissue density/tightness, especially posteriorly and at distal quad/proximal incision, to improve AROM and gait fluidity while decreasing pain. THERAPEUTIC EXERCISE: (45 minutes):  Exercises per grid below to improve mobility and strength. Required moderate visual, verbal and tactile cues to promote proper body alignment, promote wea proper body posture and promote proper body breathing techniques. Progressed repetitions as indicated.      Date:  7/24/17 Date:  7/28/17 Date:  7/31/17 Date:  8/3/17 Date:  8/7/17   Activity/Exercise Parameters Parameters Parameters Parameters Parameters   Nu step L4, 11min L5, 10min L5, 10min L3.5, 10min L3.5, 10min   QS        Heel slides w/ ball        SAQ        SLR        Sit-stand 25x 25x L LE bias, 25x 25x 30x   Seated knee Ext      RTB, 20x   Seated HSC     RTB, 20x   Bridging w/ ball        clams        HS/GS stretch w/ strap 3 x 30sec 3 x 30sec 3 x 30sec 3 x 30sec 3 x 30sec   Education        Stairs-ascend/descend reciprocally 8x 8x 10x 6x 6x   8\" box step-overs/obstacle negotiation (forward/lateral)   30x each  20x each   8\" step-ups 20x 30x 15x  10x   8\" step-overs 10x 20x 20x     3\" step-ups/downs (forward and lateral) 30x each 30x  3\" step+blue pad, 30x each    Ladder  ( slow marching, side-stepping, diagonals)  4L each   4L each   Stdg SLS  Blue pad, 10 x 10sec      Resisted side-stepping   Green, 4L     Stdg CKC TKE BTB, 30x BTB, 30x BTB, 20x BTB, 20x    Gait in clinic without cane        Education    Importance of  HEP/ICE/  NSAIDS 2x daily HEP/ICE/  NSAIDS; 2x daily     L knee AROM: 0-122deg (7-31-17)    Treatment/Session Assessment: Pt was challenged by all activities today due to pain and stiffness. Pt also admits stress/depression from family issues is contributing to her lack of motivation today. She experienced a couple episodes of LOB with ladder activities due to lack of control from increased use of momentum.   Despite her increased pain today, she is progressing toward current goals. · Response to Treatment: Pt with increased L anterior knee pain with resisted LAQ/HSC today. · Compliance with Program/Exercises: Pt reports compliance with HEP 0x since last PT visit. · Recommendations/Intent for next treatment session:  Continue to progress balance activities and hip/quad strengthening. Discussed anticipation of DC in the next 2-3 weeks.      Future Appointments  Date Time Provider Мария Rosales   8/10/2017 9:00 AM Antonella Pimentel PT [facility]   8/14/2017 9:00 AM ARNAV Quick [facility]   8/17/2017 9:00 AM Antonella Pimentel PT JAMIR [facility]       Total Treatment Duration:   PT Patient Time In/Time Out  Time In: 0900  Time Out: 0950Sierra Martin, PT Drysol Pregnancy And Lactation Text: This medication is considered safe during pregnancy and breast feeding.

## 2022-03-20 PROBLEM — M17.9 OA (OSTEOARTHRITIS) OF KNEE: Status: ACTIVE | Noted: 2017-06-07

## 2022-05-16 LAB — COLOGUARD TEST, EXTERNAL: NEGATIVE

## 2024-03-18 SDOH — HEALTH STABILITY: PHYSICAL HEALTH: ON AVERAGE, HOW MANY DAYS PER WEEK DO YOU ENGAGE IN MODERATE TO STRENUOUS EXERCISE (LIKE A BRISK WALK)?: 5 DAYS

## 2024-03-18 SDOH — HEALTH STABILITY: PHYSICAL HEALTH: ON AVERAGE, HOW MANY MINUTES DO YOU ENGAGE IN EXERCISE AT THIS LEVEL?: 10 MIN

## 2024-03-19 ENCOUNTER — OFFICE VISIT (OUTPATIENT)
Dept: FAMILY MEDICINE CLINIC | Facility: CLINIC | Age: 74
End: 2024-03-19
Payer: MEDICARE

## 2024-03-19 VITALS
WEIGHT: 201 LBS | HEIGHT: 64 IN | BODY MASS INDEX: 34.31 KG/M2 | SYSTOLIC BLOOD PRESSURE: 134 MMHG | DIASTOLIC BLOOD PRESSURE: 80 MMHG

## 2024-03-19 DIAGNOSIS — Z85.3 HISTORY OF BREAST CANCER: ICD-10-CM

## 2024-03-19 DIAGNOSIS — E11.65 TYPE 2 DIABETES MELLITUS WITH HYPERGLYCEMIA, WITHOUT LONG-TERM CURRENT USE OF INSULIN (HCC): ICD-10-CM

## 2024-03-19 DIAGNOSIS — E03.9 PRIMARY HYPOTHYROIDISM: ICD-10-CM

## 2024-03-19 DIAGNOSIS — I10 ESSENTIAL HYPERTENSION: Primary | ICD-10-CM

## 2024-03-19 DIAGNOSIS — E78.5 DYSLIPIDEMIA: ICD-10-CM

## 2024-03-19 DIAGNOSIS — Z00.00 ENCOUNTER FOR MEDICAL EXAMINATION TO ESTABLISH CARE: ICD-10-CM

## 2024-03-19 PROCEDURE — 99204 OFFICE O/P NEW MOD 45 MIN: CPT | Performed by: FAMILY MEDICINE

## 2024-03-19 PROCEDURE — 3075F SYST BP GE 130 - 139MM HG: CPT | Performed by: FAMILY MEDICINE

## 2024-03-19 PROCEDURE — 1123F ACP DISCUSS/DSCN MKR DOCD: CPT | Performed by: FAMILY MEDICINE

## 2024-03-19 PROCEDURE — 3079F DIAST BP 80-89 MM HG: CPT | Performed by: FAMILY MEDICINE

## 2024-03-19 RX ORDER — ANASTROZOLE 1 MG/1
1 TABLET ORAL DAILY
COMMUNITY
Start: 2023-12-20

## 2024-03-19 RX ORDER — AMLODIPINE BESYLATE AND BENAZEPRIL HYDROCHLORIDE 5; 10 MG/1; MG/1
1 CAPSULE ORAL DAILY
COMMUNITY
Start: 2018-01-08

## 2024-03-19 RX ORDER — MONTELUKAST SODIUM 10 MG/1
10 TABLET ORAL
COMMUNITY
Start: 2017-03-20

## 2024-03-19 RX ORDER — GLIPIZIDE 10 MG/1
10 TABLET ORAL 2 TIMES DAILY
COMMUNITY
Start: 2023-09-27

## 2024-03-19 RX ORDER — CETIRIZINE HYDROCHLORIDE 10 MG/1
10 TABLET ORAL DAILY
COMMUNITY
Start: 2023-01-18

## 2024-03-19 RX ORDER — SIMVASTATIN 20 MG
20 TABLET ORAL
COMMUNITY
Start: 2023-01-18

## 2024-03-19 RX ORDER — LEVOTHYROXINE SODIUM 0.07 MG/1
75 TABLET ORAL DAILY
COMMUNITY
Start: 2018-01-08

## 2024-03-19 RX ORDER — HYDROCHLOROTHIAZIDE 25 MG/1
25 TABLET ORAL DAILY
COMMUNITY
Start: 2017-01-16

## 2024-03-19 SDOH — ECONOMIC STABILITY: HOUSING INSECURITY
IN THE LAST 12 MONTHS, WAS THERE A TIME WHEN YOU DID NOT HAVE A STEADY PLACE TO SLEEP OR SLEPT IN A SHELTER (INCLUDING NOW)?: NO

## 2024-03-19 SDOH — ECONOMIC STABILITY: FOOD INSECURITY: WITHIN THE PAST 12 MONTHS, THE FOOD YOU BOUGHT JUST DIDN'T LAST AND YOU DIDN'T HAVE MONEY TO GET MORE.: NEVER TRUE

## 2024-03-19 SDOH — ECONOMIC STABILITY: FOOD INSECURITY: WITHIN THE PAST 12 MONTHS, YOU WORRIED THAT YOUR FOOD WOULD RUN OUT BEFORE YOU GOT MONEY TO BUY MORE.: NEVER TRUE

## 2024-03-19 SDOH — ECONOMIC STABILITY: INCOME INSECURITY: HOW HARD IS IT FOR YOU TO PAY FOR THE VERY BASICS LIKE FOOD, HOUSING, MEDICAL CARE, AND HEATING?: NOT VERY HARD

## 2024-03-19 ASSESSMENT — ENCOUNTER SYMPTOMS
DIARRHEA: 0
EYE PAIN: 0
RHINORRHEA: 0
BACK PAIN: 0
SINUS PAIN: 0
COUGH: 0
ABDOMINAL DISTENTION: 0
SHORTNESS OF BREATH: 0
EYE ITCHING: 0
BLOOD IN STOOL: 0
FACIAL SWELLING: 0
CONSTIPATION: 0
VOMITING: 0
SORE THROAT: 0
EYE DISCHARGE: 0
COLOR CHANGE: 0
WHEEZING: 0
STRIDOR: 0
CHEST TIGHTNESS: 0
EYE REDNESS: 0
NAUSEA: 0

## 2024-03-19 ASSESSMENT — PATIENT HEALTH QUESTIONNAIRE - PHQ9
SUM OF ALL RESPONSES TO PHQ9 QUESTIONS 1 & 2: 0
SUM OF ALL RESPONSES TO PHQ QUESTIONS 1-9: 0
2. FEELING DOWN, DEPRESSED OR HOPELESS: NOT AT ALL
SUM OF ALL RESPONSES TO PHQ QUESTIONS 1-9: 0
1. LITTLE INTEREST OR PLEASURE IN DOING THINGS: NOT AT ALL

## 2024-03-19 NOTE — ASSESSMENT & PLAN NOTE
LDL goal < 100. Continue statin. Encouraged to reduce red meat, fried foods, fast foods, butter, mayonnaise and dairy products; increase daily exercise and limit intake of egg yolks to < 7 egg yolks weekly.

## 2024-03-19 NOTE — ASSESSMENT & PLAN NOTE
F/u A1c. Continue regimen. Encouraged to control intake of sugar, bread, pasta, rice, potatoes, fruit, snack foods, desserts in diet. Discussed importance of daily foot check to ensure no lesion and if any wounds noted, RTC for evaluation ASAP. Instructed to refrain from walking with open toed shoes, especially outdoors and wear foot protection even in house. Encouraged to refrain from trimming toe nails short and if unable to easily trim without injury, we will schedule podiatry referral. Recommended daily BG checks for goal of <120 fasting and <180 PP. Instructed to check BG if s/s of hypoglycemia occur-shaking, mental fog, cold sweats-and if < 70, eat cracker with peanut butter and glass of milk and recheck BG in 1 hr.

## 2024-03-19 NOTE — PROGRESS NOTES
Dispense Refill    levothyroxine (SYNTHROID) 75 MCG tablet Take 1 tablet by mouth daily      metFORMIN (GLUCOPHAGE) 1000 MG tablet Take 1 tablet by mouth 2 times daily      glipiZIDE (GLUCOTROL) 10 MG tablet Take 1 tablet by mouth 2 times daily      anastrozole (ARIMIDEX) 1 MG tablet Take 1 tablet by mouth daily      cetirizine (ZYRTEC) 10 MG tablet Take 1 tablet by mouth daily      montelukast (SINGULAIR) 10 MG tablet Take 1 tablet by mouth      simvastatin (ZOCOR) 20 MG tablet Take 1 tablet by mouth      hydroCHLOROthiazide (HYDRODIURIL) 25 MG tablet Take 1 tablet by mouth daily      amLODIPine-benazepril (LOTREL) 5-10 MG per capsule Take 1 capsule by mouth daily       No current facility-administered medications for this visit.       Vitals:    /80   Ht 1.626 m (5' 4\")   Wt 91.2 kg (201 lb)   BMI 34.50 kg/m²     Physical Exam:  Physical Exam  Vitals reviewed.   Constitutional:       General: She is not in acute distress.     Appearance: Normal appearance. She is not ill-appearing, toxic-appearing or diaphoretic.   HENT:      Head: Normocephalic and atraumatic.      Right Ear: Tympanic membrane, ear canal and external ear normal. There is no impacted cerumen.      Left Ear: Tympanic membrane, ear canal and external ear normal. There is no impacted cerumen.      Nose: Nose normal. No congestion or rhinorrhea.      Mouth/Throat:      Mouth: Mucous membranes are moist.      Pharynx: No oropharyngeal exudate or posterior oropharyngeal erythema.   Eyes:      General: No scleral icterus.        Right eye: No discharge.         Left eye: No discharge.      Extraocular Movements: Extraocular movements intact.      Conjunctiva/sclera: Conjunctivae normal.      Pupils: Pupils are equal, round, and reactive to light.   Cardiovascular:      Rate and Rhythm: Normal rate and regular rhythm.      Pulses: Normal pulses.      Heart sounds: Normal heart sounds. No murmur heard.     No friction rub. No gallop.   Pulmonary:

## 2024-03-24 SDOH — HEALTH STABILITY: PHYSICAL HEALTH: ON AVERAGE, HOW MANY MINUTES DO YOU ENGAGE IN EXERCISE AT THIS LEVEL?: 120 MIN

## 2024-03-24 SDOH — HEALTH STABILITY: PHYSICAL HEALTH: ON AVERAGE, HOW MANY DAYS PER WEEK DO YOU ENGAGE IN MODERATE TO STRENUOUS EXERCISE (LIKE A BRISK WALK)?: 4 DAYS

## 2024-03-24 ASSESSMENT — LIFESTYLE VARIABLES
HOW OFTEN DO YOU HAVE SIX OR MORE DRINKS ON ONE OCCASION: 1
HOW MANY STANDARD DRINKS CONTAINING ALCOHOL DO YOU HAVE ON A TYPICAL DAY: 1 OR 2
HOW MANY STANDARD DRINKS CONTAINING ALCOHOL DO YOU HAVE ON A TYPICAL DAY: 1
HOW OFTEN DO YOU HAVE A DRINK CONTAINING ALCOHOL: 2-4 TIMES A MONTH
HOW OFTEN DO YOU HAVE A DRINK CONTAINING ALCOHOL: 3

## 2024-03-24 ASSESSMENT — PATIENT HEALTH QUESTIONNAIRE - PHQ9
SUM OF ALL RESPONSES TO PHQ QUESTIONS 1-9: 0
SUM OF ALL RESPONSES TO PHQ9 QUESTIONS 1 & 2: 0
SUM OF ALL RESPONSES TO PHQ QUESTIONS 1-9: 0
1. LITTLE INTEREST OR PLEASURE IN DOING THINGS: NOT AT ALL
SUM OF ALL RESPONSES TO PHQ QUESTIONS 1-9: 0
SUM OF ALL RESPONSES TO PHQ QUESTIONS 1-9: 0
2. FEELING DOWN, DEPRESSED OR HOPELESS: NOT AT ALL

## 2024-03-27 ENCOUNTER — OFFICE VISIT (OUTPATIENT)
Dept: FAMILY MEDICINE CLINIC | Facility: CLINIC | Age: 74
End: 2024-03-27
Payer: MEDICARE

## 2024-03-27 VITALS
WEIGHT: 201 LBS | HEIGHT: 64 IN | DIASTOLIC BLOOD PRESSURE: 70 MMHG | BODY MASS INDEX: 34.31 KG/M2 | SYSTOLIC BLOOD PRESSURE: 138 MMHG

## 2024-03-27 DIAGNOSIS — E11.65 TYPE 2 DIABETES MELLITUS WITH HYPERGLYCEMIA, WITHOUT LONG-TERM CURRENT USE OF INSULIN (HCC): ICD-10-CM

## 2024-03-27 DIAGNOSIS — E03.9 PRIMARY HYPOTHYROIDISM: ICD-10-CM

## 2024-03-27 DIAGNOSIS — Z00.00 MEDICARE ANNUAL WELLNESS VISIT, SUBSEQUENT: Primary | ICD-10-CM

## 2024-03-27 DIAGNOSIS — I10 ESSENTIAL HYPERTENSION: ICD-10-CM

## 2024-03-27 DIAGNOSIS — E78.5 DYSLIPIDEMIA: ICD-10-CM

## 2024-03-27 PROCEDURE — 3075F SYST BP GE 130 - 139MM HG: CPT | Performed by: FAMILY MEDICINE

## 2024-03-27 PROCEDURE — 3078F DIAST BP <80 MM HG: CPT | Performed by: FAMILY MEDICINE

## 2024-03-27 PROCEDURE — 1123F ACP DISCUSS/DSCN MKR DOCD: CPT | Performed by: FAMILY MEDICINE

## 2024-03-27 PROCEDURE — G0439 PPPS, SUBSEQ VISIT: HCPCS | Performed by: FAMILY MEDICINE

## 2024-03-27 PROCEDURE — 99214 OFFICE O/P EST MOD 30 MIN: CPT | Performed by: FAMILY MEDICINE

## 2024-03-27 RX ORDER — DULAGLUTIDE 3 MG/.5ML
3 INJECTION, SOLUTION SUBCUTANEOUS WEEKLY
COMMUNITY
Start: 2023-01-18

## 2024-03-27 ASSESSMENT — ENCOUNTER SYMPTOMS
VOMITING: 0
SINUS PAIN: 0
EYE PAIN: 0
BACK PAIN: 0
CHEST TIGHTNESS: 0
STRIDOR: 0
FACIAL SWELLING: 0
SORE THROAT: 0
SHORTNESS OF BREATH: 0
EYE ITCHING: 0
DIARRHEA: 0
EYE DISCHARGE: 0
SINUS PRESSURE: 0
COUGH: 0
NAUSEA: 0
ABDOMINAL DISTENTION: 0
RHINORRHEA: 0
ABDOMINAL PAIN: 0
WHEEZING: 0
BLOOD IN STOOL: 0
EYE REDNESS: 0
CONSTIPATION: 0
COLOR CHANGE: 0

## 2024-03-27 NOTE — ASSESSMENT & PLAN NOTE
Has been off meds. Recheck A1c in 3 months. Continue same med regimen. Recommended daily BG checks. Hypoglycemia tx reviewed.   Encouraged to control intake of sugar, bread, pasta, rice, potatoes, fruit, snack foods, desserts in diet. Discussed importance of daily foot check to ensure no lesion and if any wounds noted, RTC for evaluation ASAP. Instructed to refrain from walking with open toed shoes, especially outdoors and wear foot protection even in house. Encouraged to refrain from trimming toe nails short and if unable to easily trim without injury, we will schedule podiatry referral. Recommended daily BG checks for goal of <120 fasting and <180 PP. Instructed to check BG if s/s of hypoglycemia occur-shaking, mental fog, cold sweats-and if < 70, eat cracker with peanut butter and glass of milk and recheck BG in 1 hr.

## 2024-03-27 NOTE — ASSESSMENT & PLAN NOTE
Blood pressure is very well controlled today at goal. Plan to continue same regimen. Encouraged heart healthy, low-sodium diet, regular aerobic exercise.

## 2024-03-27 NOTE — ASSESSMENT & PLAN NOTE
Encouraged 5 servings of fruits and veggies daily. Instructed to drink approx 2 L of fluid daily, mostly water. Recommended 30 sustained minutes of aerobic exercise daily (e.g. cycling, rowing, jogging). Limit high calorie processed foods, red meat, egg yolks <1 daily, dairy products, butter, berkowitz, fried and fast foods.     Immunizations:  -Influenza: Recommended annually-UTD  -Covid- Advised on CDC recommendations. UTD  -PNA- Discussed at risk populations, s/e vs benefits. UTD  -Shingles- Counseled on shingles vaccine-UTD  Tdap-  UTD    Screenings:  -Colonoscopy- UTD  -mammogram- UTD

## 2024-03-27 NOTE — PROGRESS NOTES
Ugo Hamilton MD  Narrative      EXAM: Dual energy x-ray absorptiometry (DEXA) scan    COMPARISON:  Prior examination performed on a different scanning device which precludes comparison.    INDICATION: M85.89 Other specified disorders of bone density and structure, multiple sites I10;     TECHNICAL: auction[employer]AL Horizon A (S/N [id number]) device was utilized.    AP spine and bilateral proximal femurs were evaluated with a DEXA device.    FINDINGS:  From the bone mineral density measurements of the AP spine and bilateral proximal femurs, the lowest bone mineral density result obtained is obtained from the right femoral neck: 0.618 grams/cm2, T-score: -2.1.  Images on Order [id number]    Image Retrieve    The full-size image has not yet been retrieved from an outside organization. To retrieve, click the link below.  Scan on 3/3/2023: DXA bone density axial skeleton        Exam End: 03/03/23 08:29    Specimen Collected: 03/03/23 09:36 Last Resulted: 03/03/23 09:38   Received From: [facility]  Result Received: 03/18/24 15:20       Review of Systems:  Review of Systems   Constitutional:  Negative for activity change, appetite change, chills, diaphoresis, fatigue, fever and unexpected weight change.   HENT:  Negative for congestion, ear discharge, ear pain, facial swelling, hearing loss, mouth sores, nosebleeds, postnasal drip, rhinorrhea, sinus pressure, sinus pain, sore throat and tinnitus.    Eyes:  Negative for pain, discharge, redness, itching and visual disturbance.   Respiratory:  Negative for cough, chest tightness, shortness of breath, wheezing and stridor.    Cardiovascular:  Negative for chest pain, palpitations and leg swelling.   Gastrointestinal:  Negative for abdominal distention, abdominal pain, blood in stool, constipation, diarrhea, nausea and vomiting.   Endocrine: Negative for cold intolerance, heat intolerance, polydipsia, polyphagia and polyuria.   Genitourinary:  Negative for decreased urine volume,

## 2024-04-18 ENCOUNTER — HOSPITAL ENCOUNTER (OUTPATIENT)
Dept: MAMMOGRAPHY | Age: 74
Discharge: HOME | End: 2024-04-18
Attending: FAMILY MEDICINE
Payer: MEDICARE

## 2024-04-18 ENCOUNTER — HOSPITAL ENCOUNTER (OUTPATIENT)
Dept: MAMMOGRAPHY | Age: 74
End: 2024-04-18
Attending: FAMILY MEDICINE
Payer: MEDICARE

## 2024-04-18 DIAGNOSIS — Z85.3 HISTORY OF BREAST CANCER: ICD-10-CM

## 2024-04-18 PROCEDURE — G0279 TOMOSYNTHESIS, MAMMO: HCPCS

## 2024-04-23 ENCOUNTER — OFFICE VISIT (OUTPATIENT)
Dept: FAMILY MEDICINE CLINIC | Facility: CLINIC | Age: 74
End: 2024-04-23
Payer: MEDICARE

## 2024-04-23 VITALS
BODY MASS INDEX: 34.15 KG/M2 | HEIGHT: 64 IN | DIASTOLIC BLOOD PRESSURE: 70 MMHG | WEIGHT: 200 LBS | SYSTOLIC BLOOD PRESSURE: 134 MMHG

## 2024-04-23 DIAGNOSIS — J01.80 OTHER ACUTE SINUSITIS, RECURRENCE NOT SPECIFIED: Primary | ICD-10-CM

## 2024-04-23 DIAGNOSIS — R05.1 ACUTE COUGH: ICD-10-CM

## 2024-04-23 LAB
INFLUENZA A ANTIGEN, POC: NEGATIVE
INFLUENZA B ANTIGEN, POC: NEGATIVE
LOT EXPIRE DATE: NORMAL
LOT KIT NUMBER: NORMAL
SARS-COV-2 RNA, POC: NEGATIVE
VALID INTERNAL CONTROL: YES
VENDOR AND KIT NAME POC: NORMAL

## 2024-04-23 PROCEDURE — 1123F ACP DISCUSS/DSCN MKR DOCD: CPT | Performed by: FAMILY MEDICINE

## 2024-04-23 PROCEDURE — 3078F DIAST BP <80 MM HG: CPT | Performed by: FAMILY MEDICINE

## 2024-04-23 PROCEDURE — 87428 SARSCOV & INF VIR A&B AG IA: CPT | Performed by: FAMILY MEDICINE

## 2024-04-23 PROCEDURE — 99214 OFFICE O/P EST MOD 30 MIN: CPT | Performed by: FAMILY MEDICINE

## 2024-04-23 PROCEDURE — 3075F SYST BP GE 130 - 139MM HG: CPT | Performed by: FAMILY MEDICINE

## 2024-04-23 RX ORDER — BENZONATATE 100 MG/1
200 CAPSULE ORAL 3 TIMES DAILY PRN
Qty: 60 CAPSULE | Refills: 0 | Status: SHIPPED | OUTPATIENT
Start: 2024-04-23

## 2024-04-23 RX ORDER — AZITHROMYCIN 250 MG/1
TABLET, FILM COATED ORAL
Qty: 6 TABLET | Refills: 0 | Status: SHIPPED | OUTPATIENT
Start: 2024-04-23 | End: 2024-05-03

## 2024-04-23 ASSESSMENT — ENCOUNTER SYMPTOMS
EYE PAIN: 0
COLOR CHANGE: 0
FACIAL SWELLING: 0
EYE ITCHING: 0
SINUS PRESSURE: 0
STRIDOR: 0
VOMITING: 0
HEMOPTYSIS: 0
SORE THROAT: 0
ABDOMINAL DISTENTION: 0
BLOOD IN STOOL: 0
SHORTNESS OF BREATH: 0
SINUS PAIN: 0
WHEEZING: 0
ABDOMINAL PAIN: 0
CHEST TIGHTNESS: 0
EYE DISCHARGE: 0
RHINORRHEA: 1
BACK PAIN: 0
NAUSEA: 0
CONSTIPATION: 0
EYE REDNESS: 0
DIARRHEA: 0
HEARTBURN: 0

## 2024-04-23 NOTE — PROGRESS NOTES
importance of taking abx as prescribed and taking entire rx, increase hydration to at least 1-2 L of fluid daily. Recommend starting flonase 1 spray each nostril daily while on abx, over the counter antihistamine and saline nasal spray as needed for nasal dryness.            Relevant Medications    cetirizine (ZYRTEC) 10 MG tablet    benzonatate (TESSALON) 100 MG capsule    azithromycin (ZITHROMAX) 250 MG tablet    Other Relevant Orders    AMB POC SARS-COV-2 AND INFLUENZA A/B (Completed)        Isael Gonzalez III, MD

## 2024-04-23 NOTE — ASSESSMENT & PLAN NOTE
Given severity of s/s, will start abx tx. Explained importance of taking abx as prescribed and taking entire rx, increase hydration to at least 1-2 L of fluid daily. Recommend starting flonase 1 spray each nostril daily while on abx, over the counter antihistamine and saline nasal spray as needed for nasal dryness.

## 2024-04-26 PROBLEM — Z00.00 MEDICARE ANNUAL WELLNESS VISIT, SUBSEQUENT: Status: RESOLVED | Noted: 2024-03-27 | Resolved: 2024-04-26

## 2024-05-31 RX ORDER — LEVOTHYROXINE SODIUM 0.07 MG/1
75 TABLET ORAL DAILY
Qty: 30 TABLET | Refills: 0 | Status: SHIPPED | OUTPATIENT
Start: 2024-05-31

## 2024-05-31 RX ORDER — GLIPIZIDE 10 MG/1
10 TABLET ORAL 2 TIMES DAILY
Qty: 60 TABLET | Refills: 0 | Status: SHIPPED | OUTPATIENT
Start: 2024-05-31

## 2024-05-31 NOTE — TELEPHONE ENCOUNTER
Pt called in for these. Not sure if you wanted to add refills as she is overdue fr her MWV. She scheduled for early July.    Last visit 4-23-24 acute visit  Next visit 7-2-24

## 2024-06-05 NOTE — PROGRESS NOTES
[facility] Hematology and Oncology: New Patient - Consultation    Chief Complaint   Patient presents with    New Patient     Referral Diagnosis: History of breast cancer  Referring Provider: Isael Gonzalez III, MD  Family History of Cancer/ Hematology Disorders: No known family history    History of Present Illness:  Ms. Hennessy is a 73 y.o. female who presents today in referral from Dr. Gonzalez for consultation regarding breast cancer.  The past medical history is below.      During today's visit we discussed the pathophysiology of breast cancer, staging, and the importance of receptor status in terms of treatment options.  We then reviewed her medical history as well as oncologic history, recent imaging and pathology in detail.  She has been on anastrozole and tolerating it well.  She has baseline joint aches with no increase in symptoms since starting endocrine therapy.  She continues to wish to be on this medication.  Rx for continuation will be sent.  Mammogram in April completed w KRISTA.  She drinks alcohol intermittently.  Can take B complex week on week off to boost B vits.  She also reports donating blood and has been told that she may be iron deficient.  Recommend that she pursues lab work with her PCP at the end of the month to determine if she is iron deficient.  She did not fast today and she would have to repeat her blood work in a couple of weeks if she does them here today.  She is in agreement with this plan.  If she is anemic would recommend checking nutritional labs.  No other issues Or concerns.  Recommend bone density every 2 years.    Chronological Events:  5/19/21 - Mammo Screening w Edgar Bilateral (CE)  IMPRESSION: INCOMPLETE NEEDS ADDITIONAL IMAGING EVALUATIONThe multiple asymmetries in the right breast are indeterminate.  Additional views with tomosynthesis and possible ultrasound are recommended.    6/24/21 - Mammo Diagnostic w Edgar Right (CE)  IMPRESSION: SUSPICIOUS FOR MALIGNANCY,

## 2024-06-11 NOTE — PROGRESS NOTES
NEW PATIENT INTAKE    Referral Diagnosis: History of breast cancer      Referring Provider: Isael Gonzalez III, MD    Primary Care Provider: Isael Gonzalez III, MD     Family History of Cancer/ Hematology Disorders: No known family history    Presenting Symptoms: History of breast cancer    Chronological History of Pertinent Events:     74yo white female    5/19/21 - Mammo Screening w Edgar Bilateral (CE)  IMPRESSION: INCOMPLETE NEEDS ADDITIONAL IMAGING EVALUATION  The multiple asymmetries in the right breast are indeterminate.  Additional views with tomosynthesis and possible ultrasound are recommended.      6/24/21 - Mammo Diagnostic w Edgar Right (CE)  IMPRESSION: SUSPICIOUS FOR MALIGNANCY, ULTRASOUND SUSPICIOUS FOR MALIGNANCY  Suspicious right breast mass, for which ultrasound guided biopsy is recommended.  Asymmetric morphologically abnormal right axillary lymph nodes, for which ultrasound guided FNA/core biopsy is recommended.      7/2/21 - Right breast us core biopsy (CE)  Surgical Pathology (CE)  FINAL DIAGNOSIS:   RIGHT BREAST, 10:00, 6 CM FROM NIPPLE, ULTRASOUND-GUIDED CORE BIOPSY, COIL CLIP:      *   INVASIVE MAMMARY CARCINOMA, NO SPECIAL TYPE      *   HISTOLOGIC GRADE: 2 (MODERATELY DIFFERENTIATED) (3 FOR TUBULE FORMATION, 2 FOR NUCLEAR PLEOMORPHISM, AND 1 FOR MITOTIC ACTIVITY).      *   TUMOR SIZE: AT LEAST 7 MM      *   PRESENCE OF ASSOCIATED DCIS/LCIS:          o   FOCAL INTERMEDIATE GRADE DUCTAL CARCINOMA IN SITU PRESENT      *   MICROCALCIFICATIONS: NOT IDENTIFIED      *   LYMPH VASCULAR SPACE EXTENSION: NOT IDENTIFIED      *   HORMONE RECEPTOR AND HER-2/PARTHA STATUS: PENDING. ADDENDUM REPORT TO FOLLOW. SEE COMMENT.          o   COLD ISCHEMIA TIME: LESS THAN 1 MINUTE          o   OVERALL FORMALIN FIXATION TIME: 15 HOURS    7/12/21 - Met with Radiation Oncologist, Andres Baxter MD (CE)  Oncology Consult Note  Current Diagnosis: Invasive mammary carcinoma of the right breast, ER (3) 86%, CA (3)

## 2024-06-12 ENCOUNTER — OFFICE VISIT (OUTPATIENT)
Dept: ONCOLOGY | Age: 74
End: 2024-06-12
Payer: MEDICARE

## 2024-06-12 VITALS
WEIGHT: 202 LBS | RESPIRATION RATE: 16 BRPM | HEART RATE: 62 BPM | DIASTOLIC BLOOD PRESSURE: 71 MMHG | BODY MASS INDEX: 34.49 KG/M2 | TEMPERATURE: 98.2 F | HEIGHT: 64 IN | SYSTOLIC BLOOD PRESSURE: 151 MMHG | OXYGEN SATURATION: 98 %

## 2024-06-12 DIAGNOSIS — Z12.31 ENCOUNTER FOR SCREENING MAMMOGRAM FOR MALIGNANT NEOPLASM OF BREAST: ICD-10-CM

## 2024-06-12 DIAGNOSIS — Z17.0 MALIGNANT NEOPLASM OF BREAST IN FEMALE, ESTROGEN RECEPTOR POSITIVE, UNSPECIFIED LATERALITY, UNSPECIFIED SITE OF BREAST (HCC): Primary | ICD-10-CM

## 2024-06-12 DIAGNOSIS — Z79.811 USE OF AROMATASE INHIBITORS: ICD-10-CM

## 2024-06-12 DIAGNOSIS — C50.919 MALIGNANT NEOPLASM OF BREAST IN FEMALE, ESTROGEN RECEPTOR POSITIVE, UNSPECIFIED LATERALITY, UNSPECIFIED SITE OF BREAST (HCC): Primary | ICD-10-CM

## 2024-06-12 DIAGNOSIS — E55.9 VITAMIN D DEFICIENCY: ICD-10-CM

## 2024-06-12 PROCEDURE — 1123F ACP DISCUSS/DSCN MKR DOCD: CPT | Performed by: INTERNAL MEDICINE

## 2024-06-12 PROCEDURE — 3078F DIAST BP <80 MM HG: CPT | Performed by: INTERNAL MEDICINE

## 2024-06-12 PROCEDURE — 99204 OFFICE O/P NEW MOD 45 MIN: CPT | Performed by: INTERNAL MEDICINE

## 2024-06-12 PROCEDURE — 3077F SYST BP >= 140 MM HG: CPT | Performed by: INTERNAL MEDICINE

## 2024-06-12 ASSESSMENT — PATIENT HEALTH QUESTIONNAIRE - PHQ9
SUM OF ALL RESPONSES TO PHQ QUESTIONS 1-9: 0
1. LITTLE INTEREST OR PLEASURE IN DOING THINGS: NOT AT ALL
SUM OF ALL RESPONSES TO PHQ QUESTIONS 1-9: 0
2. FEELING DOWN, DEPRESSED OR HOPELESS: NOT AT ALL
SUM OF ALL RESPONSES TO PHQ QUESTIONS 1-9: 0
SUM OF ALL RESPONSES TO PHQ QUESTIONS 1-9: 0
SUM OF ALL RESPONSES TO PHQ9 QUESTIONS 1 & 2: 0

## 2024-06-12 NOTE — PATIENT INSTRUCTIONS
Patient Information from Today's Visit    The members of your Oncology Medical Home are listed below:    Physician Provider: Frieda Kemp, Medical Oncologist  Advanced Practice Clinician: Kathleen Lopez NP  Registered Nurse: Kalee KAUR RN  Navigator: Sarina TIMMONS RN  Medical Assistant: Rebecca MCNEILL MA  : Sheyla OWUSU   Supportive Care Services: Tina JIMENEZ LMSW    Diagnosis: History of breast cancer      Follow Up Instructions: About 4 months.    History reviewed.  Symptoms reviewed.  Recommend continuing on anastrozole.  Discussed role of Breast Cancer Index (BCI).  Recommend B complex vitamin week on/week off.    Treatment Summary has been discussed and given to patient:N/A      Current Labs: N/A      Please refer to After Visit Summary or [facility] for upcoming appointment information. Please call our office for rescheduling needs at least 24 hours before your scheduled appointment time.If you have any questions regarding your upcoming schedule please reach out to your care team through [employer] or call [phone].     Please notify your assigned Nurse Navigator of any unplanned hospital admissions or Emergency Room visits within 24 hours of discharge.    -------------------------------------------------------------------------------------------------------------------  Please call our office at [phone] if you have any  of the following symptoms:   Fever of 100.5 or greater  Chills  Shortness of breath  Swelling or pain in one leg    After office hours an answering service is available and will contact a provider for emergencies or if you are experiencing any of the above symptoms.        KALEE MUÑOZ RN

## 2024-06-16 PROBLEM — Z17.0 MALIGNANT NEOPLASM OF BREAST IN FEMALE, ESTROGEN RECEPTOR POSITIVE (HCC): Status: ACTIVE | Noted: 2024-06-16

## 2024-06-16 PROBLEM — E55.9 VITAMIN D DEFICIENCY: Status: ACTIVE | Noted: 2024-06-16

## 2024-06-16 PROBLEM — Z79.811 USE OF AROMATASE INHIBITORS: Status: ACTIVE | Noted: 2024-06-16

## 2024-06-16 PROBLEM — C50.919 MALIGNANT NEOPLASM OF BREAST IN FEMALE, ESTROGEN RECEPTOR POSITIVE (HCC): Status: ACTIVE | Noted: 2024-06-16

## 2024-06-16 PROBLEM — Z12.31 ENCOUNTER FOR SCREENING MAMMOGRAM FOR MALIGNANT NEOPLASM OF BREAST: Status: ACTIVE | Noted: 2024-06-16

## 2024-06-27 DIAGNOSIS — E11.65 TYPE 2 DIABETES MELLITUS WITH HYPERGLYCEMIA, WITHOUT LONG-TERM CURRENT USE OF INSULIN (HCC): ICD-10-CM

## 2024-06-27 DIAGNOSIS — I10 ESSENTIAL HYPERTENSION: ICD-10-CM

## 2024-06-27 DIAGNOSIS — E03.9 PRIMARY HYPOTHYROIDISM: ICD-10-CM

## 2024-06-27 DIAGNOSIS — E78.5 DYSLIPIDEMIA: ICD-10-CM

## 2024-06-27 LAB
ALBUMIN SERPL-MCNC: 4.4 G/DL (ref 3.2–4.6)
ALBUMIN/GLOB SERPL: 1.8 (ref 1–1.9)
ALP SERPL-CCNC: 67 U/L (ref 35–104)
ALT SERPL-CCNC: 16 U/L (ref 12–65)
ANION GAP SERPL CALC-SCNC: 13 MMOL/L (ref 9–18)
AST SERPL-CCNC: 27 U/L (ref 15–37)
BASOPHILS # BLD: 0.1 K/UL (ref 0–0.2)
BASOPHILS NFR BLD: 1 % (ref 0–2)
BILIRUB SERPL-MCNC: 0.4 MG/DL (ref 0–1.2)
BUN SERPL-MCNC: 12 MG/DL (ref 8–23)
CALCIUM SERPL-MCNC: 9.6 MG/DL (ref 8.8–10.2)
CHLORIDE SERPL-SCNC: 99 MMOL/L (ref 98–107)
CHOLEST SERPL-MCNC: 127 MG/DL (ref 0–200)
CO2 SERPL-SCNC: 30 MMOL/L (ref 20–28)
CREAT SERPL-MCNC: 0.8 MG/DL (ref 0.6–1.1)
DIFFERENTIAL METHOD BLD: NORMAL
EOSINOPHIL # BLD: 0.2 K/UL (ref 0–0.8)
EOSINOPHIL NFR BLD: 5 % (ref 0.5–7.8)
ERYTHROCYTE [DISTWIDTH] IN BLOOD BY AUTOMATED COUNT: 13.9 % (ref 11.9–14.6)
EST. AVERAGE GLUCOSE BLD GHB EST-MCNC: 159 MG/DL
GLOBULIN SER CALC-MCNC: 2.5 G/DL (ref 2.3–3.5)
GLUCOSE SERPL-MCNC: 97 MG/DL (ref 70–99)
HBA1C MFR BLD: 7.2 % (ref 0–5.6)
HCT VFR BLD AUTO: 44.5 % (ref 35.8–46.3)
HDLC SERPL-MCNC: 46 MG/DL (ref 40–60)
HDLC SERPL: 2.7 (ref 0–5)
HGB BLD-MCNC: 14.5 G/DL (ref 11.7–15.4)
IMM GRANULOCYTES # BLD AUTO: 0 K/UL (ref 0–0.5)
IMM GRANULOCYTES NFR BLD AUTO: 0 % (ref 0–5)
LDLC SERPL CALC-MCNC: 42 MG/DL (ref 0–100)
LYMPHOCYTES # BLD: 1.3 K/UL (ref 0.5–4.6)
LYMPHOCYTES NFR BLD: 28 % (ref 13–44)
MCH RBC QN AUTO: 29.4 PG (ref 26.1–32.9)
MCHC RBC AUTO-ENTMCNC: 32.6 G/DL (ref 31.4–35)
MCV RBC AUTO: 90.3 FL (ref 82–102)
MONOCYTES # BLD: 0.4 K/UL (ref 0.1–1.3)
MONOCYTES NFR BLD: 9 % (ref 4–12)
NEUTS SEG # BLD: 2.6 K/UL (ref 1.7–8.2)
NEUTS SEG NFR BLD: 57 % (ref 43–78)
NRBC # BLD: 0 K/UL (ref 0–0.2)
PLATELET # BLD AUTO: 259 K/UL (ref 150–450)
PMV BLD AUTO: 12 FL (ref 9.4–12.3)
POTASSIUM SERPL-SCNC: 3.8 MMOL/L (ref 3.5–5.1)
PROT SERPL-MCNC: 6.9 G/DL (ref 6.3–8.2)
RBC # BLD AUTO: 4.93 M/UL (ref 4.05–5.2)
SODIUM SERPL-SCNC: 141 MMOL/L (ref 136–145)
TRIGL SERPL-MCNC: 193 MG/DL (ref 0–150)
TSH, 3RD GENERATION: 1.6 UIU/ML (ref 0.27–4.2)
VLDLC SERPL CALC-MCNC: 39 MG/DL (ref 6–23)
WBC # BLD AUTO: 4.6 K/UL (ref 4.3–11.1)

## 2024-07-02 ENCOUNTER — OFFICE VISIT (OUTPATIENT)
Dept: FAMILY MEDICINE CLINIC | Facility: CLINIC | Age: 74
End: 2024-07-02
Payer: MEDICARE

## 2024-07-02 VITALS
DIASTOLIC BLOOD PRESSURE: 66 MMHG | HEART RATE: 68 BPM | HEIGHT: 64 IN | BODY MASS INDEX: 33.63 KG/M2 | SYSTOLIC BLOOD PRESSURE: 128 MMHG | WEIGHT: 197 LBS

## 2024-07-02 DIAGNOSIS — I10 ESSENTIAL HYPERTENSION: ICD-10-CM

## 2024-07-02 DIAGNOSIS — E11.65 TYPE 2 DIABETES MELLITUS WITH HYPERGLYCEMIA, WITHOUT LONG-TERM CURRENT USE OF INSULIN (HCC): Primary | ICD-10-CM

## 2024-07-02 DIAGNOSIS — E78.5 DYSLIPIDEMIA: ICD-10-CM

## 2024-07-02 DIAGNOSIS — J31.0 CHRONIC RHINITIS: ICD-10-CM

## 2024-07-02 DIAGNOSIS — E03.9 PRIMARY HYPOTHYROIDISM: ICD-10-CM

## 2024-07-02 DIAGNOSIS — Z85.3 HISTORY OF BREAST CANCER: ICD-10-CM

## 2024-07-02 PROCEDURE — 3051F HG A1C>EQUAL 7.0%<8.0%: CPT | Performed by: FAMILY MEDICINE

## 2024-07-02 PROCEDURE — 3074F SYST BP LT 130 MM HG: CPT | Performed by: FAMILY MEDICINE

## 2024-07-02 PROCEDURE — 99214 OFFICE O/P EST MOD 30 MIN: CPT | Performed by: FAMILY MEDICINE

## 2024-07-02 PROCEDURE — 1123F ACP DISCUSS/DSCN MKR DOCD: CPT | Performed by: FAMILY MEDICINE

## 2024-07-02 PROCEDURE — 3078F DIAST BP <80 MM HG: CPT | Performed by: FAMILY MEDICINE

## 2024-07-02 RX ORDER — ANASTROZOLE 1 MG/1
1 TABLET ORAL DAILY
Qty: 90 TABLET | Refills: 3 | Status: SHIPPED | OUTPATIENT
Start: 2024-07-02

## 2024-07-02 RX ORDER — GLIPIZIDE 10 MG/1
10 TABLET ORAL 2 TIMES DAILY
Qty: 90 TABLET | Refills: 3 | Status: SHIPPED | OUTPATIENT
Start: 2024-07-02

## 2024-07-02 RX ORDER — SIMVASTATIN 20 MG
20 TABLET ORAL NIGHTLY
Qty: 90 TABLET | Refills: 3 | Status: SHIPPED | OUTPATIENT
Start: 2024-07-02

## 2024-07-02 RX ORDER — GLUCOSAMINE HCL/CHONDROITIN SU 500-400 MG
CAPSULE ORAL
Qty: 200 STRIP | Refills: 3 | Status: SHIPPED | OUTPATIENT
Start: 2024-07-02

## 2024-07-02 RX ORDER — MONTELUKAST SODIUM 10 MG/1
10 TABLET ORAL NIGHTLY
Qty: 90 TABLET | Refills: 3 | Status: SHIPPED | OUTPATIENT
Start: 2024-07-02

## 2024-07-02 RX ORDER — DULAGLUTIDE 3 MG/.5ML
3 INJECTION, SOLUTION SUBCUTANEOUS WEEKLY
Qty: 12 ADJUSTABLE DOSE PRE-FILLED PEN SYRINGE | Refills: 3 | Status: SHIPPED | OUTPATIENT
Start: 2024-07-02

## 2024-07-02 RX ORDER — LANCETS
1 EACH MISCELLANEOUS DAILY
Qty: 100 EACH | Refills: 3 | Status: SHIPPED | OUTPATIENT
Start: 2024-07-02

## 2024-07-02 RX ORDER — HYDROCHLOROTHIAZIDE 25 MG/1
25 TABLET ORAL DAILY
Qty: 90 TABLET | Refills: 3 | Status: SHIPPED | OUTPATIENT
Start: 2024-07-02

## 2024-07-02 RX ORDER — LEVOTHYROXINE SODIUM 0.07 MG/1
75 TABLET ORAL DAILY
Qty: 90 TABLET | Refills: 3 | Status: SHIPPED | OUTPATIENT
Start: 2024-07-02

## 2024-07-02 RX ORDER — AMLODIPINE BESYLATE AND BENAZEPRIL HYDROCHLORIDE 5; 10 MG/1; MG/1
1 CAPSULE ORAL DAILY
Qty: 90 CAPSULE | Refills: 3 | Status: SHIPPED | OUTPATIENT
Start: 2024-07-02

## 2024-07-02 ASSESSMENT — ENCOUNTER SYMPTOMS
EYE REDNESS: 0
COLOR CHANGE: 0
VOMITING: 0
SINUS PRESSURE: 0
NAUSEA: 0
RHINORRHEA: 0
EYE PAIN: 0
ABDOMINAL PAIN: 0
DIARRHEA: 0
EYE ITCHING: 0
BLOOD IN STOOL: 0
COUGH: 0
SORE THROAT: 0
CHEST TIGHTNESS: 0
SHORTNESS OF BREATH: 0
ABDOMINAL DISTENTION: 0
FACIAL SWELLING: 0
WHEEZING: 0
SINUS PAIN: 0
BACK PAIN: 0
CONSTIPATION: 0
EYE DISCHARGE: 0
STRIDOR: 0

## 2024-07-02 NOTE — ASSESSMENT & PLAN NOTE
A1c stable. Would prefer to see it under 7. Continue trulicity 3 mg weekly, glipizide 10mg bid, metformin 1gm bid. Encouraged to control intake of sugar, bread, pasta, rice, potatoes, fruit, snack foods, desserts in diet. Discussed importance of daily foot check to ensure no lesion and if any wounds noted, RTC for evaluation ASAP. Instructed to refrain from walking with open toed shoes, especially outdoors and wear foot protection even in house. Encouraged to refrain from trimming toe nails short and if unable to easily trim without injury, we will schedule podiatry referral. Recommended daily BG checks for goal of <120 fasting and <180 PP. Instructed to check BG if s/s of hypoglycemia occur-shaking, mental fog, cold sweats-and if < 70, eat cracker with peanut butter and glass of milk and recheck BG in 1 hr.

## 2024-07-02 NOTE — ASSESSMENT & PLAN NOTE
Blood pressure is very well controlled today at goal. Plan to continue lotrel 5/10mg daily, hctz 25 mg daily. Encouraged heart healthy, low-sodium diet, regular aerobic exercise.

## 2024-07-02 NOTE — TELEPHONE ENCOUNTER
[facility] called requesting that these be sent in separately from the earlier RX that was sent for the device.   They will need the strips and lancets on a sep RX. Attached them here

## 2024-07-02 NOTE — ASSESSMENT & PLAN NOTE
LDL goal < 100. Continue zocor 20 mg nightly. Encouraged to reduce red meat, fried foods, fast foods, butter, mayonnaise and dairy products; increase daily exercise and limit intake of egg yolks to < 7 egg yolks weekly.

## 2024-07-16 PROBLEM — Z12.31 ENCOUNTER FOR SCREENING MAMMOGRAM FOR MALIGNANT NEOPLASM OF BREAST: Status: RESOLVED | Noted: 2024-06-16 | Resolved: 2024-07-16

## 2024-11-06 ENCOUNTER — HOSPITAL ENCOUNTER (OUTPATIENT)
Dept: LAB | Age: 74
Discharge: HOME | End: 2024-11-06
Payer: MEDICARE

## 2024-11-06 ENCOUNTER — OFFICE VISIT (OUTPATIENT)
Dept: ONCOLOGY | Age: 74
End: 2024-11-06
Payer: MEDICARE

## 2024-11-06 VITALS
OXYGEN SATURATION: 98 % | TEMPERATURE: 98.4 F | HEIGHT: 64 IN | DIASTOLIC BLOOD PRESSURE: 64 MMHG | HEART RATE: 69 BPM | BODY MASS INDEX: 33.8 KG/M2 | WEIGHT: 198 LBS | RESPIRATION RATE: 16 BRPM | SYSTOLIC BLOOD PRESSURE: 127 MMHG

## 2024-11-06 DIAGNOSIS — Z79.811 USE OF AROMATASE INHIBITORS: ICD-10-CM

## 2024-11-06 DIAGNOSIS — Z17.0 MALIGNANT NEOPLASM OF BREAST IN FEMALE, ESTROGEN RECEPTOR POSITIVE, UNSPECIFIED LATERALITY, UNSPECIFIED SITE OF BREAST (HCC): Primary | ICD-10-CM

## 2024-11-06 DIAGNOSIS — C50.919 MALIGNANT NEOPLASM OF BREAST IN FEMALE, ESTROGEN RECEPTOR POSITIVE, UNSPECIFIED LATERALITY, UNSPECIFIED SITE OF BREAST (HCC): Primary | ICD-10-CM

## 2024-11-06 LAB
ALBUMIN SERPL-MCNC: 4.3 G/DL (ref 3.2–4.6)
ALBUMIN/GLOB SERPL: 1.4 (ref 1–1.9)
ALP SERPL-CCNC: 76 U/L (ref 35–104)
ALT SERPL-CCNC: 13 U/L (ref 8–45)
ANION GAP SERPL CALC-SCNC: 15 MMOL/L (ref 7–16)
AST SERPL-CCNC: 23 U/L (ref 15–37)
BASOPHILS # BLD: 0.1 K/UL (ref 0–0.2)
BASOPHILS NFR BLD: 1 % (ref 0–2)
BILIRUB SERPL-MCNC: 0.4 MG/DL (ref 0–1.2)
BUN SERPL-MCNC: 18 MG/DL (ref 8–23)
CALCIUM SERPL-MCNC: 9.2 MG/DL (ref 8.8–10.2)
CHLORIDE SERPL-SCNC: 98 MMOL/L (ref 98–107)
CO2 SERPL-SCNC: 29 MMOL/L (ref 20–29)
CREAT SERPL-MCNC: 0.84 MG/DL (ref 0.6–1.1)
DIFFERENTIAL METHOD BLD: NORMAL
EOSINOPHIL # BLD: 0.2 K/UL (ref 0–0.8)
EOSINOPHIL NFR BLD: 3 % (ref 0.5–7.8)
ERYTHROCYTE [DISTWIDTH] IN BLOOD BY AUTOMATED COUNT: 12.7 % (ref 11.9–14.6)
GLOBULIN SER CALC-MCNC: 3 G/DL (ref 2.3–3.5)
GLUCOSE SERPL-MCNC: 160 MG/DL (ref 70–99)
HCT VFR BLD AUTO: 44.1 % (ref 35.8–46.3)
HGB BLD-MCNC: 14.8 G/DL (ref 11.7–15.4)
IMM GRANULOCYTES # BLD AUTO: 0 K/UL (ref 0–0.5)
IMM GRANULOCYTES NFR BLD AUTO: 0 % (ref 0–5)
LYMPHOCYTES # BLD: 1.3 K/UL (ref 0.5–4.6)
LYMPHOCYTES NFR BLD: 22 % (ref 13–44)
MCH RBC QN AUTO: 30.8 PG (ref 26.1–32.9)
MCHC RBC AUTO-ENTMCNC: 33.6 G/DL (ref 31.4–35)
MCV RBC AUTO: 91.9 FL (ref 82–102)
MONOCYTES # BLD: 0.5 K/UL (ref 0.1–1.3)
MONOCYTES NFR BLD: 9 % (ref 4–12)
NEUTS SEG # BLD: 3.9 K/UL (ref 1.7–8.2)
NEUTS SEG NFR BLD: 65 % (ref 43–78)
NRBC # BLD: 0 K/UL (ref 0–0.2)
PLATELET # BLD AUTO: 258 K/UL (ref 150–450)
PMV BLD AUTO: 10.9 FL (ref 9.4–12.3)
POTASSIUM SERPL-SCNC: 3.6 MMOL/L (ref 3.5–5.1)
PROT SERPL-MCNC: 7.3 G/DL (ref 6.3–8.2)
RBC # BLD AUTO: 4.8 M/UL (ref 4.05–5.2)
SODIUM SERPL-SCNC: 142 MMOL/L (ref 136–145)
WBC # BLD AUTO: 6 K/UL (ref 4.3–11.1)

## 2024-11-06 PROCEDURE — 1090F PRES/ABSN URINE INCON ASSESS: CPT

## 2024-11-06 PROCEDURE — 3017F COLORECTAL CA SCREEN DOC REV: CPT

## 2024-11-06 PROCEDURE — 1126F AMNT PAIN NOTED NONE PRSNT: CPT

## 2024-11-06 PROCEDURE — 1036F TOBACCO NON-USER: CPT

## 2024-11-06 PROCEDURE — 1123F ACP DISCUSS/DSCN MKR DOCD: CPT

## 2024-11-06 PROCEDURE — G8484 FLU IMMUNIZE NO ADMIN: HCPCS

## 2024-11-06 PROCEDURE — 3078F DIAST BP <80 MM HG: CPT

## 2024-11-06 PROCEDURE — 99213 OFFICE O/P EST LOW 20 MIN: CPT

## 2024-11-06 PROCEDURE — G8417 CALC BMI ABV UP PARAM F/U: HCPCS

## 2024-11-06 PROCEDURE — G8399 PT W/DXA RESULTS DOCUMENT: HCPCS

## 2024-11-06 PROCEDURE — 3074F SYST BP LT 130 MM HG: CPT

## 2024-11-06 PROCEDURE — G8427 DOCREV CUR MEDS BY ELIG CLIN: HCPCS

## 2024-11-06 PROCEDURE — 36415 COLL VENOUS BLD VENIPUNCTURE: CPT

## 2024-11-06 PROCEDURE — 80053 COMPREHEN METABOLIC PANEL: CPT

## 2024-11-06 PROCEDURE — 85025 COMPLETE CBC W/AUTO DIFF WBC: CPT

## 2024-11-06 ASSESSMENT — PATIENT HEALTH QUESTIONNAIRE - PHQ9
SUM OF ALL RESPONSES TO PHQ QUESTIONS 1-9: 0
SUM OF ALL RESPONSES TO PHQ9 QUESTIONS 1 & 2: 0
2. FEELING DOWN, DEPRESSED OR HOPELESS: NOT AT ALL
1. LITTLE INTEREST OR PLEASURE IN DOING THINGS: NOT AT ALL
SUM OF ALL RESPONSES TO PHQ QUESTIONS 1-9: 0

## 2024-11-12 ASSESSMENT — ENCOUNTER SYMPTOMS
SCLERAL ICTERUS: 0
VOICE CHANGE: 0
HEMOPTYSIS: 0
ABDOMINAL PAIN: 0
TROUBLE SWALLOWING: 0
CHEST TIGHTNESS: 0
SHORTNESS OF BREATH: 0
VOMITING: 0
SORE THROAT: 0
BLOOD IN STOOL: 0
DIARRHEA: 0
WHEEZING: 0
ABDOMINAL DISTENTION: 0
NAUSEA: 0
CONSTIPATION: 0

## 2024-11-12 NOTE — PROGRESS NOTES
[facility] Hematology and Oncology: Established Patient - Follow Up    Chief Complaint   Patient presents with    Follow-up     Referral Diagnosis: History of breast cancer  Referring Provider: Isael Gonzalez III, MD  Family History of Cancer/ Hematology Disorders: No known family history    History of Present Illness:  Ms. Hennessy is a 73 y.o. female who presents today in referral from Dr. Alice guthrie. provider found for consultation regarding breast cancer.  The past medical history is below.      During consultation, we discussed the pathophysiology of breast cancer, staging, and the importance of receptor status in terms of treatment options.  We then reviewed her medical history as well as oncologic history, recent imaging and pathology in detail.  She has been on anastrozole and tolerating it well.  She has baseline joint aches with no increase in symptoms since starting endocrine therapy.  She continues to wish to be on this medication.  Rx for continuation will be sent.  Mammogram in April completed w KRISTA.  She drinks alcohol intermittently.  Can take B complex week on week off to boost B vits.  She also reports donating blood and has been told that she may be iron deficient.  Recommend that she pursues lab work with her PCP at the end of the month to determine if she is iron deficient.  She did not fast today and she would have to repeat her blood work in a couple of weeks if she does them here today.  She is in agreement with this plan.  If she is anemic would recommend checking nutritional labs.  No other issues Or concerns.  Recommend bone density every 2 years.    She is here today for FU on Anastrozole. She is doing well. She reports that she is feeling well and has no health updates/concerns. She denies any side effects from the Anastrozole. She performs her SBE at home. Breast exam was performed today without any concerns noted. Her VS and labs were reviewed. She will continue Anastrozole and her

## 2024-11-25 ENCOUNTER — OFFICE VISIT (OUTPATIENT)
Dept: FAMILY MEDICINE CLINIC | Facility: CLINIC | Age: 74
End: 2024-11-25

## 2024-11-25 VITALS — TEMPERATURE: 99.1 F | WEIGHT: 202 LBS | BODY MASS INDEX: 34.67 KG/M2

## 2024-11-25 DIAGNOSIS — B96.89 ACUTE BACTERIAL SINUSITIS: Primary | ICD-10-CM

## 2024-11-25 DIAGNOSIS — J01.90 ACUTE BACTERIAL SINUSITIS: Primary | ICD-10-CM

## 2024-11-25 RX ORDER — BENZONATATE 100 MG/1
200 CAPSULE ORAL 3 TIMES DAILY PRN
Qty: 60 CAPSULE | Refills: 0 | Status: SHIPPED | OUTPATIENT
Start: 2024-11-25

## 2024-11-25 RX ORDER — DEXTROMETHORPHAN HYDROBROMIDE AND PROMETHAZINE HYDROCHLORIDE 15; 6.25 MG/5ML; MG/5ML
5 SYRUP ORAL 4 TIMES DAILY PRN
Qty: 118 ML | Refills: 0 | Status: SHIPPED | OUTPATIENT
Start: 2024-11-25 | End: 2024-12-02

## 2024-11-25 RX ORDER — AMOXICILLIN 500 MG/1
500 CAPSULE ORAL 3 TIMES DAILY
Qty: 21 CAPSULE | Refills: 0 | Status: SHIPPED | OUTPATIENT
Start: 2024-11-25 | End: 2024-12-02

## 2024-11-25 ASSESSMENT — ENCOUNTER SYMPTOMS
EYE DISCHARGE: 0
ABDOMINAL PAIN: 0
EYE REDNESS: 0
FACIAL SWELLING: 0
RHINORRHEA: 0
SHORTNESS OF BREATH: 0
DIARRHEA: 0
COLOR CHANGE: 0
VOMITING: 0
NAUSEA: 0
COUGH: 1
SINUS PAIN: 0
STRIDOR: 0
EYE ITCHING: 0
EYE PAIN: 0
SINUS PRESSURE: 0
ABDOMINAL DISTENTION: 0
CONSTIPATION: 0
BLOOD IN STOOL: 0
HEMOPTYSIS: 0
SORE THROAT: 1
HEARTBURN: 0
BACK PAIN: 0
WHEEZING: 0
CHEST TIGHTNESS: 0

## 2024-11-25 NOTE — PROGRESS NOTES
[facility]  _______________________________________  Geremias Hamilton MD                 [address] [place] [address]        Isael Gonzalez [place]                     [place] [address]                                                                                    Phone: [phone]                                                                                    Fax: [phone]    Marjan Hennessy is a 73 y.o. female who is seen for evaluation of   Chief Complaint   Patient presents with    Cough    Fever       HPI:   Cough  This is a new problem. The current episode started yesterday. The problem has been gradually worsening. The problem occurs constantly. The cough is Productive of sputum. Associated symptoms include chills, a fever, nasal congestion, a sore throat and sweats. Pertinent negatives include no chest pain, ear congestion, ear pain, eye redness, headaches, heartburn, hemoptysis, myalgias, postnasal drip, rash, rhinorrhea, shortness of breath, weight loss or wheezing. Nothing aggravates the symptoms. She has tried nothing for the symptoms.   Fever   Associated symptoms include coughing and a sore throat. Pertinent negatives include no abdominal pain, chest pain, congestion, diarrhea, ear pain, headaches, nausea, rash, urinary pain, vomiting or wheezing.       Review of Systems:  Review of Systems   Constitutional:  Positive for chills and fever. Negative for activity change, appetite change, diaphoresis, fatigue, unexpected weight change and weight loss.   HENT:  Positive for sore throat. Negative for congestion, ear discharge, ear pain, facial swelling, hearing loss, mouth sores, nosebleeds, postnasal drip, rhinorrhea, sinus pressure, sinus pain and tinnitus.    Eyes:  Negative for pain, discharge, redness, itching and visual disturbance.   Respiratory:  Positive for cough. Negative for hemoptysis, chest tightness, shortness of breath, wheezing and stridor.    Cardiovascular:

## 2024-12-03 ENCOUNTER — OFFICE VISIT (OUTPATIENT)
Dept: FAMILY MEDICINE CLINIC | Facility: CLINIC | Age: 74
End: 2024-12-03
Payer: MEDICARE

## 2024-12-03 VITALS
TEMPERATURE: 98.3 F | BODY MASS INDEX: 34.31 KG/M2 | HEART RATE: 68 BPM | HEIGHT: 64 IN | WEIGHT: 201 LBS | OXYGEN SATURATION: 93 %

## 2024-12-03 DIAGNOSIS — J40 BRONCHITIS: ICD-10-CM

## 2024-12-03 DIAGNOSIS — B96.89 ACUTE BACTERIAL SINUSITIS: Primary | ICD-10-CM

## 2024-12-03 DIAGNOSIS — J01.90 ACUTE BACTERIAL SINUSITIS: Primary | ICD-10-CM

## 2024-12-03 PROCEDURE — 1123F ACP DISCUSS/DSCN MKR DOCD: CPT | Performed by: FAMILY MEDICINE

## 2024-12-03 PROCEDURE — G8427 DOCREV CUR MEDS BY ELIG CLIN: HCPCS | Performed by: FAMILY MEDICINE

## 2024-12-03 PROCEDURE — 99213 OFFICE O/P EST LOW 20 MIN: CPT | Performed by: FAMILY MEDICINE

## 2024-12-03 PROCEDURE — 1159F MED LIST DOCD IN RCRD: CPT | Performed by: FAMILY MEDICINE

## 2024-12-03 PROCEDURE — 1160F RVW MEDS BY RX/DR IN RCRD: CPT | Performed by: FAMILY MEDICINE

## 2024-12-03 PROCEDURE — 1036F TOBACCO NON-USER: CPT | Performed by: FAMILY MEDICINE

## 2024-12-03 PROCEDURE — 3017F COLORECTAL CA SCREEN DOC REV: CPT | Performed by: FAMILY MEDICINE

## 2024-12-03 PROCEDURE — G8417 CALC BMI ABV UP PARAM F/U: HCPCS | Performed by: FAMILY MEDICINE

## 2024-12-03 PROCEDURE — G8484 FLU IMMUNIZE NO ADMIN: HCPCS | Performed by: FAMILY MEDICINE

## 2024-12-03 PROCEDURE — 1090F PRES/ABSN URINE INCON ASSESS: CPT | Performed by: FAMILY MEDICINE

## 2024-12-03 PROCEDURE — G8399 PT W/DXA RESULTS DOCUMENT: HCPCS | Performed by: FAMILY MEDICINE

## 2024-12-03 RX ORDER — HYDROCODONE BITARTRATE AND HOMATROPINE METHYLBROMIDE ORAL SOLUTION 5; 1.5 MG/5ML; MG/5ML
5 LIQUID ORAL EVERY 6 HOURS PRN
Qty: 140 ML | Refills: 0 | Status: SHIPPED | OUTPATIENT
Start: 2024-12-03 | End: 2024-12-10

## 2024-12-03 RX ORDER — DOXYCYCLINE HYCLATE 100 MG
100 TABLET ORAL 2 TIMES DAILY
Qty: 14 TABLET | Refills: 0 | Status: SHIPPED | OUTPATIENT
Start: 2024-12-03 | End: 2024-12-10

## 2024-12-03 RX ORDER — ALBUTEROL SULFATE 90 UG/1
2 INHALANT RESPIRATORY (INHALATION) 4 TIMES DAILY PRN
Qty: 18 G | Refills: 0 | Status: SHIPPED | OUTPATIENT
Start: 2024-12-03

## 2024-12-03 RX ORDER — METHYLPREDNISOLONE 4 MG/1
TABLET ORAL
Qty: 21 TABLET | Refills: 0 | Status: SHIPPED | OUTPATIENT
Start: 2024-12-03 | End: 2024-12-09

## 2024-12-03 RX ORDER — BENZONATATE 100 MG/1
200 CAPSULE ORAL 3 TIMES DAILY PRN
Qty: 60 CAPSULE | Refills: 0 | Status: SHIPPED | OUTPATIENT
Start: 2024-12-03

## 2024-12-03 ASSESSMENT — ENCOUNTER SYMPTOMS
DIARRHEA: 0
COUGH: 1
COLOR CHANGE: 0
BLOOD IN STOOL: 0
EYE REDNESS: 0
NAUSEA: 0
WHEEZING: 0
VOMITING: 0
EYE DISCHARGE: 0
SINUS PRESSURE: 0
SINUS PAIN: 0
EYE PAIN: 0
FACIAL SWELLING: 0
STRIDOR: 0
HEARTBURN: 0
RHINORRHEA: 1
BACK PAIN: 0
SHORTNESS OF BREATH: 0
EYE ITCHING: 0
CHEST TIGHTNESS: 0
ABDOMINAL DISTENTION: 0
SORE THROAT: 1
ABDOMINAL PAIN: 0
HEMOPTYSIS: 0
CONSTIPATION: 0

## 2024-12-03 NOTE — PROGRESS NOTES
[facility]  _______________________________________  Geremias Hamilton MD                 [address] [place] [address]        Isael Gonzalez MD                     [place] [address]                                                                                    Phone: [phone]                                                                                    Fax: [phone]    Marjan Hennessy is a 73 y.o. female who is seen for evaluation of   Chief Complaint   Patient presents with    Cough     Was seen 11-25-24, has gotten worse    Fatigue       HPI:   Cough  This is a new problem. The current episode started 1 to 4 weeks ago (seen 11/25/24- rx'd amox and promethazine.). The problem has been unchanged. The problem occurs constantly. The cough is Productive of sputum. Associated symptoms include nasal congestion, rhinorrhea and a sore throat. Pertinent negatives include no chest pain, chills, ear congestion, ear pain, eye redness, fever, headaches, heartburn, hemoptysis, myalgias, postnasal drip, rash, shortness of breath, sweats, weight loss or wheezing. Nothing aggravates the symptoms. Treatments tried: amox, promethazine, tessalon. The treatment provided no relief. There is no history of asthma, bronchiectasis, bronchitis, COPD, emphysema, environmental allergies or pneumonia.   Fatigue  Associated symptoms include coughing, fatigue and a sore throat. Pertinent negatives include no abdominal pain, arthralgias, chest pain, chills, congestion, diaphoresis, fever, headaches, joint swelling, myalgias, nausea, neck pain, numbness, rash, vomiting or weakness.       Review of Systems:  Review of Systems   Constitutional:  Positive for fatigue. Negative for activity change, appetite change, chills, diaphoresis, fever, unexpected weight change and weight loss.   HENT:  Positive for rhinorrhea and sore throat. Negative for congestion, ear discharge, ear pain, facial swelling, hearing loss, mouth

## 2024-12-30 ENCOUNTER — LAB (OUTPATIENT)
Dept: FAMILY MEDICINE CLINIC | Facility: CLINIC | Age: 74
End: 2024-12-30

## 2024-12-30 DIAGNOSIS — E11.65 TYPE 2 DIABETES MELLITUS WITH HYPERGLYCEMIA, WITHOUT LONG-TERM CURRENT USE OF INSULIN (HCC): ICD-10-CM

## 2024-12-30 DIAGNOSIS — E03.9 PRIMARY HYPOTHYROIDISM: ICD-10-CM

## 2024-12-30 LAB
EST. AVERAGE GLUCOSE BLD GHB EST-MCNC: 198 MG/DL
HBA1C MFR BLD: 8.5 % (ref 0–5.6)
TSH, 3RD GENERATION: 1.81 UIU/ML (ref 0.27–4.2)

## 2024-12-30 RX ORDER — GLIPIZIDE 10 MG/1
10 TABLET ORAL 2 TIMES DAILY
Qty: 90 TABLET | Refills: 3 | OUTPATIENT
Start: 2024-12-30

## 2025-01-02 ENCOUNTER — OFFICE VISIT (OUTPATIENT)
Dept: FAMILY MEDICINE CLINIC | Facility: CLINIC | Age: 75
End: 2025-01-02

## 2025-01-02 VITALS — WEIGHT: 193 LBS | HEIGHT: 64 IN | BODY MASS INDEX: 32.95 KG/M2

## 2025-01-02 DIAGNOSIS — E78.5 DYSLIPIDEMIA: ICD-10-CM

## 2025-01-02 DIAGNOSIS — E11.65 TYPE 2 DIABETES MELLITUS WITH HYPERGLYCEMIA, WITHOUT LONG-TERM CURRENT USE OF INSULIN (HCC): Primary | ICD-10-CM

## 2025-01-02 DIAGNOSIS — I10 ESSENTIAL HYPERTENSION: ICD-10-CM

## 2025-01-02 DIAGNOSIS — E03.9 PRIMARY HYPOTHYROIDISM: ICD-10-CM

## 2025-01-02 PROBLEM — C50.919 MALIGNANT NEOPLASM OF BREAST IN FEMALE, ESTROGEN RECEPTOR POSITIVE (HCC): Status: RESOLVED | Noted: 2024-06-16 | Resolved: 2025-01-02

## 2025-01-02 PROBLEM — Z17.0 MALIGNANT NEOPLASM OF BREAST IN FEMALE, ESTROGEN RECEPTOR POSITIVE (HCC): Status: RESOLVED | Noted: 2024-06-16 | Resolved: 2025-01-02

## 2025-01-02 LAB
CREAT UR-MCNC: 171 MG/DL (ref 28–217)
MICROALBUMIN UR-MCNC: 4.12 MG/DL (ref 0–20)
MICROALBUMIN/CREAT UR-RTO: 24 MG/G (ref 0–30)

## 2025-01-02 ASSESSMENT — ENCOUNTER SYMPTOMS
DIARRHEA: 0
CONSTIPATION: 0
COLOR CHANGE: 0
EYE REDNESS: 0
SINUS PAIN: 0
SHORTNESS OF BREATH: 0
SORE THROAT: 0
CHEST TIGHTNESS: 0
EYE DISCHARGE: 0
COUGH: 0
BACK PAIN: 0
VOMITING: 0
FACIAL SWELLING: 0
ABDOMINAL DISTENTION: 0
ABDOMINAL PAIN: 0
SINUS PRESSURE: 0
BLOOD IN STOOL: 0
STRIDOR: 0
WHEEZING: 0
EYE ITCHING: 0
EYE PAIN: 0
NAUSEA: 0
RHINORRHEA: 0

## 2025-01-02 ASSESSMENT — PATIENT HEALTH QUESTIONNAIRE - PHQ9
SUM OF ALL RESPONSES TO PHQ QUESTIONS 1-9: 1
2. FEELING DOWN, DEPRESSED OR HOPELESS: SEVERAL DAYS
SUM OF ALL RESPONSES TO PHQ QUESTIONS 1-9: 1
SUM OF ALL RESPONSES TO PHQ QUESTIONS 1-9: 1
SUM OF ALL RESPONSES TO PHQ9 QUESTIONS 1 & 2: 1
SUM OF ALL RESPONSES TO PHQ QUESTIONS 1-9: 1
1. LITTLE INTEREST OR PLEASURE IN DOING THINGS: NOT AT ALL

## 2025-01-02 NOTE — PROGRESS NOTES
[facility]  _______________________________________  Geremias Hamilton MD                 [address] [place] [address]        Isael Gonzalez [place] [address]                                                                                    Phone: [phone]                                                                                    Fax: [phone]    Marjan Hennessy is a 74 y.o. female who is seen for evaluation of   Chief Complaint   Patient presents with    Discuss Labs    Diabetes    Hypertension       HPI:   Mrs. Hennessy is a 74 y/o F with h/o HTN, hypothyroidism, T2DM, dyslipidemia, breast ca, here for f/u.    - breast cancer in 2021. Had lumpectomy and radiation. Previously seen by Char. On arimadex.     - HTN- BP a bit elevated today. Compliant with meds.      - T2DM- A1c 8.5<-- 7.2<--7.3. compliant with meds but diet has been sub-par with holiday eating.     - dyslipidemia- on statin. Last LDL 42 but pt admits to not taking statin regularly.     - hypothyroidism- TSH is at goal.     - osteopenia- last dexa last year. Pt has no regular exercise routine.     - R knee pain- s/p L TKA. Needs R. Taking turmeric. Declined replacement.         Hemoglobin A1C   Date Value Ref Range Status   12/30/2024 8.5 (H) 0 - 5.6 % Final     Comment:     Reference Range  Normal       <5.7%  Prediabetes  5.7-6.4%  Diabetes     >6.4%       Lab Results   Component Value Date    TSH 1.810 12/30/2024       Review of Systems:  Review of Systems   Constitutional:  Negative for activity change, appetite change, chills, diaphoresis, fatigue, fever and unexpected weight change.   HENT:  Negative for congestion, ear discharge, ear pain, facial swelling, hearing loss, mouth sores, nosebleeds, postnasal drip, rhinorrhea, sinus pressure, sinus pain, sore throat and tinnitus.    Eyes:  Negative for pain, discharge, redness, itching and visual disturbance.   Respiratory:  Negative for cough,

## 2025-01-02 NOTE — ASSESSMENT & PLAN NOTE
A1c high. Pt declines med changes citing holiday diet lapse. Advised importance of compliance. Recheck A1c in 3 months. Continue metformin, glipizide. Encouraged to control intake of sugar, bread, pasta, rice, potatoes, fruit, snack foods, desserts in diet. Discussed importance of daily foot check to ensure no lesion and if any wounds noted, RTC for evaluation ASAP. Instructed to refrain from walking with open toed shoes, especially outdoors and wear foot protection even in house. Encouraged to refrain from trimming toe nails short and if unable to easily trim without injury, we will schedule podiatry referral. Recommended daily BG checks for goal of <120 fasting and <180 PP. Instructed to check BG if s/s of hypoglycemia occur-shaking, mental fog, cold sweats-and if < 70, eat cracker with peanut butter and glass of milk and recheck BG in 1 hr.

## 2025-01-02 NOTE — ASSESSMENT & PLAN NOTE
LDL goal < 100. Continue zocor 20 mg nightly. Encouraged to reduce red meat, fried foods, fast foods, butter, mayonnaise and dairy products; increase daily exercise and limit intake of egg yolks to < 7 egg yolks weekly.                 History of breast cancer               Use of aromatase inhibitors               Vitamin D deficiency                   [place] as Reviewed  Last Reviewed by Isael Gonzalez III, MD on 1/2/2025 at 8:58 AM  Problem List Activity

## 2025-01-21 DIAGNOSIS — E11.65 TYPE 2 DIABETES MELLITUS WITH HYPERGLYCEMIA, WITHOUT LONG-TERM CURRENT USE OF INSULIN (HCC): ICD-10-CM

## 2025-01-22 RX ORDER — GLIPIZIDE 10 MG/1
10 TABLET ORAL 2 TIMES DAILY
Qty: 180 TABLET | Refills: 3 | Status: SHIPPED | OUTPATIENT
Start: 2025-01-22

## 2025-04-09 ENCOUNTER — LAB (OUTPATIENT)
Dept: FAMILY MEDICINE CLINIC | Facility: CLINIC | Age: 75
End: 2025-04-09

## 2025-04-09 DIAGNOSIS — E11.65 TYPE 2 DIABETES MELLITUS WITH HYPERGLYCEMIA, WITHOUT LONG-TERM CURRENT USE OF INSULIN (HCC): ICD-10-CM

## 2025-04-09 LAB
ANION GAP SERPL CALC-SCNC: 12 MMOL/L (ref 7–16)
BUN SERPL-MCNC: 13 MG/DL (ref 8–23)
CALCIUM SERPL-MCNC: 9.5 MG/DL (ref 8.8–10.2)
CHLORIDE SERPL-SCNC: 103 MMOL/L (ref 98–107)
CO2 SERPL-SCNC: 27 MMOL/L (ref 20–29)
CREAT SERPL-MCNC: 0.74 MG/DL (ref 0.6–1.1)
EST. AVERAGE GLUCOSE BLD GHB EST-MCNC: 141 MG/DL
GLUCOSE SERPL-MCNC: 79 MG/DL (ref 70–99)
HBA1C MFR BLD: 6.5 % (ref 0–5.6)
POTASSIUM SERPL-SCNC: 4.3 MMOL/L (ref 3.5–5.1)
SODIUM SERPL-SCNC: 143 MMOL/L (ref 136–145)

## 2025-04-12 SDOH — ECONOMIC STABILITY: TRANSPORTATION INSECURITY
IN THE PAST 12 MONTHS, HAS LACK OF TRANSPORTATION KEPT YOU FROM MEETINGS, WORK, OR FROM GETTING THINGS NEEDED FOR DAILY LIVING?: NO

## 2025-04-12 SDOH — ECONOMIC STABILITY: FOOD INSECURITY: WITHIN THE PAST 12 MONTHS, YOU WORRIED THAT YOUR FOOD WOULD RUN OUT BEFORE YOU GOT MONEY TO BUY MORE.: NEVER TRUE

## 2025-04-12 SDOH — ECONOMIC STABILITY: TRANSPORTATION INSECURITY
IN THE PAST 12 MONTHS, HAS THE LACK OF TRANSPORTATION KEPT YOU FROM MEDICAL APPOINTMENTS OR FROM GETTING MEDICATIONS?: NO

## 2025-04-12 SDOH — ECONOMIC STABILITY: INCOME INSECURITY: IN THE LAST 12 MONTHS, WAS THERE A TIME WHEN YOU WERE NOT ABLE TO PAY THE MORTGAGE OR RENT ON TIME?: NO

## 2025-04-12 SDOH — ECONOMIC STABILITY: FOOD INSECURITY: WITHIN THE PAST 12 MONTHS, THE FOOD YOU BOUGHT JUST DIDN'T LAST AND YOU DIDN'T HAVE MONEY TO GET MORE.: NEVER TRUE

## 2025-04-12 SDOH — HEALTH STABILITY: PHYSICAL HEALTH: ON AVERAGE, HOW MANY MINUTES DO YOU ENGAGE IN EXERCISE AT THIS LEVEL?: 20 MIN

## 2025-04-12 ASSESSMENT — LIFESTYLE VARIABLES
HOW OFTEN DO YOU HAVE A DRINK CONTAINING ALCOHOL: 2-4 TIMES A MONTH
HOW MANY STANDARD DRINKS CONTAINING ALCOHOL DO YOU HAVE ON A TYPICAL DAY: 1 OR 2
HOW MANY STANDARD DRINKS CONTAINING ALCOHOL DO YOU HAVE ON A TYPICAL DAY: 1
HOW OFTEN DO YOU HAVE A DRINK CONTAINING ALCOHOL: 3

## 2025-04-12 ASSESSMENT — PATIENT HEALTH QUESTIONNAIRE - PHQ9
SUM OF ALL RESPONSES TO PHQ QUESTIONS 1-9: 0
2. FEELING DOWN, DEPRESSED OR HOPELESS: NOT AT ALL
1. LITTLE INTEREST OR PLEASURE IN DOING THINGS: NOT AT ALL

## 2025-04-14 ENCOUNTER — OFFICE VISIT (OUTPATIENT)
Dept: FAMILY MEDICINE CLINIC | Facility: CLINIC | Age: 75
End: 2025-04-14
Payer: MEDICARE

## 2025-04-14 VITALS
DIASTOLIC BLOOD PRESSURE: 60 MMHG | BODY MASS INDEX: 32.95 KG/M2 | HEIGHT: 64 IN | SYSTOLIC BLOOD PRESSURE: 118 MMHG | WEIGHT: 193 LBS

## 2025-04-14 DIAGNOSIS — E03.9 PRIMARY HYPOTHYROIDISM: ICD-10-CM

## 2025-04-14 DIAGNOSIS — I10 ESSENTIAL HYPERTENSION: ICD-10-CM

## 2025-04-14 DIAGNOSIS — E11.65 TYPE 2 DIABETES MELLITUS WITH HYPERGLYCEMIA, WITHOUT LONG-TERM CURRENT USE OF INSULIN (HCC): ICD-10-CM

## 2025-04-14 DIAGNOSIS — J31.0 CHRONIC RHINITIS: ICD-10-CM

## 2025-04-14 DIAGNOSIS — E78.5 DYSLIPIDEMIA: ICD-10-CM

## 2025-04-14 DIAGNOSIS — Z00.00 MEDICARE ANNUAL WELLNESS VISIT, SUBSEQUENT: Primary | ICD-10-CM

## 2025-04-14 DIAGNOSIS — Z85.3 HISTORY OF BREAST CANCER: ICD-10-CM

## 2025-04-14 PROCEDURE — 3078F DIAST BP <80 MM HG: CPT | Performed by: FAMILY MEDICINE

## 2025-04-14 PROCEDURE — 3044F HG A1C LEVEL LT 7.0%: CPT | Performed by: FAMILY MEDICINE

## 2025-04-14 PROCEDURE — G0439 PPPS, SUBSEQ VISIT: HCPCS | Performed by: FAMILY MEDICINE

## 2025-04-14 PROCEDURE — 1123F ACP DISCUSS/DSCN MKR DOCD: CPT | Performed by: FAMILY MEDICINE

## 2025-04-14 PROCEDURE — 1036F TOBACCO NON-USER: CPT | Performed by: FAMILY MEDICINE

## 2025-04-14 PROCEDURE — G8399 PT W/DXA RESULTS DOCUMENT: HCPCS | Performed by: FAMILY MEDICINE

## 2025-04-14 PROCEDURE — G8427 DOCREV CUR MEDS BY ELIG CLIN: HCPCS | Performed by: FAMILY MEDICINE

## 2025-04-14 PROCEDURE — 99214 OFFICE O/P EST MOD 30 MIN: CPT | Performed by: FAMILY MEDICINE

## 2025-04-14 PROCEDURE — 1159F MED LIST DOCD IN RCRD: CPT | Performed by: FAMILY MEDICINE

## 2025-04-14 PROCEDURE — 3074F SYST BP LT 130 MM HG: CPT | Performed by: FAMILY MEDICINE

## 2025-04-14 PROCEDURE — G8417 CALC BMI ABV UP PARAM F/U: HCPCS | Performed by: FAMILY MEDICINE

## 2025-04-14 PROCEDURE — 1090F PRES/ABSN URINE INCON ASSESS: CPT | Performed by: FAMILY MEDICINE

## 2025-04-14 PROCEDURE — 1160F RVW MEDS BY RX/DR IN RCRD: CPT | Performed by: FAMILY MEDICINE

## 2025-04-14 RX ORDER — AMLODIPINE AND BENAZEPRIL HYDROCHLORIDE 5; 10 MG/1; MG/1
1 CAPSULE ORAL DAILY
Qty: 90 CAPSULE | Refills: 3 | Status: SHIPPED | OUTPATIENT
Start: 2025-04-14

## 2025-04-14 RX ORDER — ANASTROZOLE 1 MG/1
1 TABLET ORAL DAILY
Qty: 90 TABLET | Refills: 3 | Status: SHIPPED | OUTPATIENT
Start: 2025-04-14

## 2025-04-14 RX ORDER — HYDROCHLOROTHIAZIDE 25 MG/1
25 TABLET ORAL DAILY
Qty: 90 TABLET | Refills: 3 | Status: SHIPPED | OUTPATIENT
Start: 2025-04-14

## 2025-04-14 RX ORDER — SIMVASTATIN 20 MG
20 TABLET ORAL NIGHTLY
Qty: 90 TABLET | Refills: 3 | Status: SHIPPED | OUTPATIENT
Start: 2025-04-14

## 2025-04-14 RX ORDER — LEVOTHYROXINE SODIUM 75 UG/1
75 TABLET ORAL DAILY
Qty: 90 TABLET | Refills: 3 | Status: SHIPPED | OUTPATIENT
Start: 2025-04-14

## 2025-04-14 RX ORDER — MONTELUKAST SODIUM 10 MG/1
10 TABLET ORAL NIGHTLY
Qty: 90 TABLET | Refills: 3 | Status: SHIPPED | OUTPATIENT
Start: 2025-04-14

## 2025-04-14 ASSESSMENT — ENCOUNTER SYMPTOMS
EYE REDNESS: 0
SORE THROAT: 0
ABDOMINAL PAIN: 0
DIARRHEA: 0
COUGH: 0
BLOOD IN STOOL: 0
VOMITING: 0
RHINORRHEA: 0
ABDOMINAL DISTENTION: 0
BACK PAIN: 0
WHEEZING: 0
EYE PAIN: 0
FACIAL SWELLING: 0
SINUS PRESSURE: 0
SHORTNESS OF BREATH: 0
EYE ITCHING: 0
SINUS PAIN: 0
CONSTIPATION: 0
STRIDOR: 0
NAUSEA: 0
EYE DISCHARGE: 0
COLOR CHANGE: 0
CHEST TIGHTNESS: 0

## 2025-04-14 NOTE — PROGRESS NOTES
dairy products; increase daily aerobic exercise.    Orders:  -     simvastatin (ZOCOR) 20 MG tablet; Take 1 tablet by mouth nightly, Disp-90 tablet, R-3Normal  -     Lipid Panel; Future       Return in about 3 months (around 7/14/2025) for OV labs prior.     Subjective       Patient's complete Health Risk Assessment and screening values have been reviewed and are found in Flowsheets. The following problems were reviewed today and where indicated follow up appointments were made and/or referrals ordered.    Positive Risk Factor Screenings with Interventions:                Abnormal BMI (obese):  Body mass index is 33.13 kg/m². (!) Abnormal  Interventions:  low carbohydrate diet, exercise for at least 150 minutes/week           Safety:  Do you have non-slip mats or non-slip surfaces or shower bars or grab bars in your shower or bathtub?: (!) (Patient-Rptd) No  Interventions:  Patient declined any further interventions or treatment                   Objective   Vitals:    04/14/25 0833   BP: 118/60   BP Site: Left Upper Arm   Patient Position: Sitting   BP Cuff Size: Large Adult   Weight: 87.5 kg (193 lb)   Height: 1.626 m (5' 4\")      Body mass index is 33.13 kg/m².                    Allergies   Allergen Reactions    Cat Dander Other (See Comments)    Fire Ant Other (See Comments)    Influenza Vaccines Other (See Comments)     Breaathing issues/bronchitis    Other Dermatitis and Other (See Comments)     Neosporin/sulfa/bacitracin   \"all ointments- creams are OK\"-   Blisters   Pt reports she cannot use any ointments; she reports the base in ointments causes blisters.    Sulfa Antibiotics Itching and Rash     Other reaction(s): Rash    Tramadol Nausea And Vomiting     Other reaction(s): Nausea and Vomiting     Prior to Visit Medications    Medication Sig Taking? Authorizing Provider   amLODIPine-benazepril (LOTREL) 5-10 MG per capsule Take 1 capsule by mouth daily Yes Isael Gonzalez III, MD   anastrozole (ARIMIDEX)

## 2025-04-14 NOTE — ASSESSMENT & PLAN NOTE
Chronic, at goal (stable), continue current treatment plan- continue metformin, glipizide, trulicity. Encouraged to control intake of sugar, bread, pasta, rice, potatoes, fruit, snack foods, desserts in diet. Discussed importance of daily foot check to ensure no lesion and if any wounds noted, RTC for evaluation ASAP. Instructed to refrain from walking with open toed shoes, especially outdoors and wear foot protection even in house. Encouraged to refrain from trimming toe nails short and if unable to easily trim without injury, we will schedule podiatry referral. Recommended daily BG checks for goal of <120 fasting and <180 PP. Instructed to check BG if s/s of hypoglycemia occur-shaking, mental fog, cold sweats-and if < 70, eat cracker with peanut butter and glass of milk and recheck BG in 1 hr.

## 2025-04-14 NOTE — ASSESSMENT & PLAN NOTE
Chronic, at goal (stable), continue current treatment plan- zocor. Encouraged to reduce red meat, fried foods, fast foods, butter, mayonnaise and dairy products; increase daily aerobic exercise.

## 2025-04-15 PROBLEM — Z00.00 MEDICARE ANNUAL WELLNESS VISIT, SUBSEQUENT: Status: ACTIVE | Noted: 2025-04-15

## 2025-04-15 PROBLEM — J31.0 CHRONIC RHINITIS: Status: ACTIVE | Noted: 2025-04-15

## 2025-04-26 ENCOUNTER — HOSPITAL ENCOUNTER (OUTPATIENT)
Dept: MAMMOGRAPHY | Age: 75
Discharge: HOME | End: 2025-04-29
Attending: INTERNAL MEDICINE
Payer: MEDICARE

## 2025-04-26 VITALS — HEIGHT: 64 IN | BODY MASS INDEX: 33.29 KG/M2 | WEIGHT: 195 LBS

## 2025-04-26 DIAGNOSIS — Z12.31 ENCOUNTER FOR SCREENING MAMMOGRAM FOR MALIGNANT NEOPLASM OF BREAST: ICD-10-CM

## 2025-04-26 PROCEDURE — 77063 BREAST TOMOSYNTHESIS BI: CPT

## 2025-05-08 ENCOUNTER — OFFICE VISIT (OUTPATIENT)
Dept: ONCOLOGY | Age: 75
End: 2025-05-08
Payer: MEDICARE

## 2025-05-08 ENCOUNTER — HOSPITAL ENCOUNTER (OUTPATIENT)
Dept: LAB | Age: 75
Discharge: HOME | End: 2025-05-08
Payer: MEDICARE

## 2025-05-08 VITALS
BODY MASS INDEX: 34.09 KG/M2 | SYSTOLIC BLOOD PRESSURE: 110 MMHG | RESPIRATION RATE: 16 BRPM | DIASTOLIC BLOOD PRESSURE: 68 MMHG | WEIGHT: 199.7 LBS | OXYGEN SATURATION: 99 % | HEIGHT: 64 IN | HEART RATE: 66 BPM | TEMPERATURE: 98.7 F

## 2025-05-08 DIAGNOSIS — D64.9 ANEMIA, UNSPECIFIED TYPE: ICD-10-CM

## 2025-05-08 DIAGNOSIS — Z79.811 USE OF AROMATASE INHIBITORS: ICD-10-CM

## 2025-05-08 DIAGNOSIS — Z17.0 MALIGNANT NEOPLASM OF BREAST IN FEMALE, ESTROGEN RECEPTOR POSITIVE, UNSPECIFIED LATERALITY, UNSPECIFIED SITE OF BREAST (HCC): ICD-10-CM

## 2025-05-08 DIAGNOSIS — C50.919 MALIGNANT NEOPLASM OF BREAST IN FEMALE, ESTROGEN RECEPTOR POSITIVE, UNSPECIFIED LATERALITY, UNSPECIFIED SITE OF BREAST (HCC): Primary | ICD-10-CM

## 2025-05-08 DIAGNOSIS — E55.9 VITAMIN D DEFICIENCY: ICD-10-CM

## 2025-05-08 DIAGNOSIS — Z17.0 MALIGNANT NEOPLASM OF BREAST IN FEMALE, ESTROGEN RECEPTOR POSITIVE, UNSPECIFIED LATERALITY, UNSPECIFIED SITE OF BREAST (HCC): Primary | ICD-10-CM

## 2025-05-08 DIAGNOSIS — C50.919 MALIGNANT NEOPLASM OF BREAST IN FEMALE, ESTROGEN RECEPTOR POSITIVE, UNSPECIFIED LATERALITY, UNSPECIFIED SITE OF BREAST (HCC): ICD-10-CM

## 2025-05-08 LAB
25(OH)D3 SERPL-MCNC: 22.7 NG/ML (ref 30–100)
ALBUMIN SERPL-MCNC: 3.9 G/DL (ref 3.2–4.6)
ALBUMIN/GLOB SERPL: 1.3 (ref 1–1.9)
ALP SERPL-CCNC: 79 U/L (ref 35–104)
ALT SERPL-CCNC: 11 U/L (ref 8–45)
ANION GAP SERPL CALC-SCNC: 12 MMOL/L (ref 7–16)
AST SERPL-CCNC: 20 U/L (ref 15–37)
BASOPHILS # BLD: 0.05 K/UL (ref 0–0.2)
BASOPHILS NFR BLD: 1 % (ref 0–2)
BILIRUB SERPL-MCNC: 0.3 MG/DL (ref 0–1.2)
BUN SERPL-MCNC: 12 MG/DL (ref 8–23)
CALCIUM SERPL-MCNC: 9.4 MG/DL (ref 8.8–10.2)
CHLORIDE SERPL-SCNC: 100 MMOL/L (ref 98–107)
CO2 SERPL-SCNC: 28 MMOL/L (ref 20–29)
CREAT SERPL-MCNC: 0.85 MG/DL (ref 0.6–1.1)
DIFFERENTIAL METHOD BLD: ABNORMAL
EOSINOPHIL # BLD: 0.22 K/UL (ref 0–0.8)
EOSINOPHIL NFR BLD: 4.4 % (ref 0.5–7.8)
ERYTHROCYTE [DISTWIDTH] IN BLOOD BY AUTOMATED COUNT: 14.8 % (ref 11.9–14.6)
FERRITIN SERPL-MCNC: 12 NG/ML (ref 8–388)
GLOBULIN SER CALC-MCNC: 3.1 G/DL (ref 2.3–3.5)
GLUCOSE SERPL-MCNC: 281 MG/DL (ref 70–99)
HCT VFR BLD AUTO: 36.3 % (ref 35.8–46.3)
HGB BLD-MCNC: 11.2 G/DL (ref 11.7–15.4)
IMM GRANULOCYTES # BLD AUTO: 0.03 K/UL (ref 0–0.5)
IMM GRANULOCYTES NFR BLD AUTO: 0.6 % (ref 0–5)
IRON SATN MFR SERPL: 11 % (ref 20–50)
IRON SERPL-MCNC: 40 UG/DL (ref 35–100)
LYMPHOCYTES # BLD: 1.25 K/UL (ref 0.5–4.6)
LYMPHOCYTES NFR BLD: 24.9 % (ref 13–44)
MCH RBC QN AUTO: 26.3 PG (ref 26.1–32.9)
MCHC RBC AUTO-ENTMCNC: 30.9 G/DL (ref 31.4–35)
MCV RBC AUTO: 85.2 FL (ref 82–102)
MONOCYTES # BLD: 0.5 K/UL (ref 0.1–1.3)
MONOCYTES NFR BLD: 10 % (ref 4–12)
NEUTS SEG # BLD: 2.97 K/UL (ref 1.7–8.2)
NEUTS SEG NFR BLD: 59.1 % (ref 43–78)
NRBC # BLD: 0 K/UL (ref 0–0.2)
PLATELET # BLD AUTO: 272 K/UL (ref 150–450)
PMV BLD AUTO: 11.5 FL (ref 9.4–12.3)
POTASSIUM SERPL-SCNC: 4 MMOL/L (ref 3.5–5.1)
PROT SERPL-MCNC: 7 G/DL (ref 6.3–8.2)
RBC # BLD AUTO: 4.26 M/UL (ref 4.05–5.2)
SODIUM SERPL-SCNC: 140 MMOL/L (ref 136–145)
TIBC SERPL-MCNC: 354 UG/DL (ref 240–450)
UIBC SERPL-MCNC: 314 UG/DL (ref 112–347)
VIT B12 SERPL-MCNC: 233 PG/ML (ref 193–986)
WBC # BLD AUTO: 5 K/UL (ref 4.3–11.1)

## 2025-05-08 PROCEDURE — 82306 VITAMIN D 25 HYDROXY: CPT

## 2025-05-08 PROCEDURE — 1036F TOBACCO NON-USER: CPT | Performed by: NURSE PRACTITIONER

## 2025-05-08 PROCEDURE — 83540 ASSAY OF IRON: CPT

## 2025-05-08 PROCEDURE — G8417 CALC BMI ABV UP PARAM F/U: HCPCS | Performed by: NURSE PRACTITIONER

## 2025-05-08 PROCEDURE — G8399 PT W/DXA RESULTS DOCUMENT: HCPCS | Performed by: NURSE PRACTITIONER

## 2025-05-08 PROCEDURE — 82728 ASSAY OF FERRITIN: CPT

## 2025-05-08 PROCEDURE — 1160F RVW MEDS BY RX/DR IN RCRD: CPT | Performed by: NURSE PRACTITIONER

## 2025-05-08 PROCEDURE — G8427 DOCREV CUR MEDS BY ELIG CLIN: HCPCS | Performed by: NURSE PRACTITIONER

## 2025-05-08 PROCEDURE — 3074F SYST BP LT 130 MM HG: CPT | Performed by: NURSE PRACTITIONER

## 2025-05-08 PROCEDURE — 36415 COLL VENOUS BLD VENIPUNCTURE: CPT

## 2025-05-08 PROCEDURE — 99214 OFFICE O/P EST MOD 30 MIN: CPT | Performed by: NURSE PRACTITIONER

## 2025-05-08 PROCEDURE — 1159F MED LIST DOCD IN RCRD: CPT | Performed by: NURSE PRACTITIONER

## 2025-05-08 PROCEDURE — 85025 COMPLETE CBC W/AUTO DIFF WBC: CPT

## 2025-05-08 PROCEDURE — 1126F AMNT PAIN NOTED NONE PRSNT: CPT | Performed by: NURSE PRACTITIONER

## 2025-05-08 PROCEDURE — 1123F ACP DISCUSS/DSCN MKR DOCD: CPT | Performed by: NURSE PRACTITIONER

## 2025-05-08 PROCEDURE — 3078F DIAST BP <80 MM HG: CPT | Performed by: NURSE PRACTITIONER

## 2025-05-08 PROCEDURE — 82607 VITAMIN B-12: CPT

## 2025-05-08 PROCEDURE — 83550 IRON BINDING TEST: CPT

## 2025-05-08 PROCEDURE — 1090F PRES/ABSN URINE INCON ASSESS: CPT | Performed by: NURSE PRACTITIONER

## 2025-05-08 PROCEDURE — 80053 COMPREHEN METABOLIC PANEL: CPT

## 2025-05-08 ASSESSMENT — ENCOUNTER SYMPTOMS
WHEEZING: 0
SHORTNESS OF BREATH: 0
SORE THROAT: 0
HEMOPTYSIS: 0
VOMITING: 0
CONSTIPATION: 0
ABDOMINAL DISTENTION: 0
TROUBLE SWALLOWING: 0
DIARRHEA: 0
NAUSEA: 0
VOICE CHANGE: 0
CHEST TIGHTNESS: 0
SCLERAL ICTERUS: 0
BLOOD IN STOOL: 0

## 2025-05-08 ASSESSMENT — PATIENT HEALTH QUESTIONNAIRE - PHQ9
1. LITTLE INTEREST OR PLEASURE IN DOING THINGS: NOT AT ALL
SUM OF ALL RESPONSES TO PHQ QUESTIONS 1-9: 0
2. FEELING DOWN, DEPRESSED OR HOPELESS: NOT AT ALL
SUM OF ALL RESPONSES TO PHQ QUESTIONS 1-9: 0

## 2025-05-08 NOTE — PROGRESS NOTES
1. Malignant neoplasm of breast in female, estrogen receptor positive, unspecified laterality, unspecified site of breast (HCC)  Iron and TIBC    Ferritin    Vitamin B12    CBC with Auto Differential    Comprehensive Metabolic Panel    Ferritin    Iron and TIBC      2. Anemia, unspecified type  Iron and TIBC    Ferritin    Vitamin B12    CBC with Auto Differential    Comprehensive Metabolic Panel    Ferritin    Iron and TIBC        Ms. Hennessy is here for evaluation of breast cancer.      OSH 2021 - upper-outer quadrant right breast cancer, ER+ - 7/2/2021: Stage IA (cT1c, cN0, cM0, G2, ER+, SD+, HER2-)   - s/p Right breast hologic localized lumpectomy (7/27/21)  S/p Re-excision of right breast lumpectomy site (8/2/21)  XRT 9/15/2021 - 10/5/2021 Radiation Treatment Summary  Treatment Technique: Tangents 10x  Radiation Treatments: 9/15/21-10/5/21  - started on anastrozole July 2021 5/08/2025: She returns today for follow up on anastrozole. She is doing well overall. She has no new issues or complaints to voice. Energy is good, appetite too. She has no new pain. No breast symptoms. No hot flashes. No vaginal dryness. Labs reviewed and Hgb down to 11.2. She gives blood often - will add on iron studies. She will restart her oral iron which she tolerates well.  Mammogram results pending. Iron stores returned low with ferritin 12, TSAT 11%, B12 233. In addition to her oral iron, she will start vitamin B complex. Dexa due now. Follow up in 4-6 months or sooner if needed.     - Mammogram in April completed w KRISTA.    - at risk for bone density loss - Recommend bone density every 2 years.  Rec vit D repletion     RESUSCITATION DIRECTIVES/HOSPICE CARE: Full Support    RTC 3-6 mo or sooner as needed     MDM    Lab studies and imaging studies were personally reviewed.  Pertinent old records were reviewed.     Historical:  -  we discussed the pathophysiology of breast cancer, staging, and the importance of receptor status in

## 2025-05-15 PROBLEM — Z00.00 MEDICARE ANNUAL WELLNESS VISIT, SUBSEQUENT: Status: RESOLVED | Noted: 2025-04-15 | Resolved: 2025-05-15

## 2025-05-28 ENCOUNTER — RESULTS FOLLOW-UP (OUTPATIENT)
Dept: ONCOLOGY | Age: 75
End: 2025-05-28

## (undated) DEVICE — (D)PREP SKN CHLRAPRP APPL 26ML -- CONVERT TO ITEM 371833

## (undated) DEVICE — SYR LR LCK 1ML GRAD NSAF 30ML --

## (undated) DEVICE — SUTURE FIBERWIRE SZ 2 W/ TAPERED NEEDLE BLUE L38IN NONABSORB BLU L26.5MM 1/2 CIRCLE AR7200

## (undated) DEVICE — TRAY CATH 16F DRN BG LTX -- CONVERT TO ITEM 363158

## (undated) DEVICE — SUTURE VCRL SZ 2-0 L27IN ABSRB UD L36MM CP-1 1/2 CIR REV J266H

## (undated) DEVICE — 2000CC GUARDIAN II: Brand: GUARDIAN

## (undated) DEVICE — Z DISCONTINUED USE 2744636  DRESSING AQUACEL 14 IN ALG W3.5XL14IN POLYUR FLM CVR W/ HYDRCOLL

## (undated) DEVICE — NEEDLE SPNL 22GA L3.5IN BLK HUB S STL REG WALL FIT STYL W/

## (undated) DEVICE — T4 HOOD

## (undated) DEVICE — DRAPE,TOP,102X53,STERILE: Brand: MEDLINE

## (undated) DEVICE — PACK PROCEDURE SURG TOT KNEE

## (undated) DEVICE — 3000CC GUARDIAN II: Brand: GUARDIAN

## (undated) DEVICE — BUTTON SWITCH PENCIL BLADE ELECTRODE, HOLSTER: Brand: EDGE

## (undated) DEVICE — SKIN STAPLER: Brand: SIGNET

## (undated) DEVICE — MEDI-VAC YANKAUER SUCTION HANDLE W/BULBOUS TIP: Brand: CARDINAL HEALTH

## (undated) DEVICE — (D)SYR 10ML 1/5ML GRAD NSAF -- PKGING CHANGE USE ITEM 338027

## (undated) DEVICE — NEEDLE HYPO 21GA L1.5IN INTRAMUSCULAR S STL LATCH BVL UP

## (undated) DEVICE — SOLUTION IV 1000ML 0.9% SOD CHL

## (undated) DEVICE — SOLUTION IRRIG 3000ML 0.9% SOD CHL FLX CONT 0797208] ICU MEDICAL INC]

## (undated) DEVICE — TRAY PREP DRY W/ PREM GLV 2 APPL 6 SPNG 2 UNDPD 1 OVERWRAP

## (undated) DEVICE — SUTURE MCRYL SZ 3-0 L27IN ABSRB UD L19MM PS-2 3/8 CIR PRIM Y427H

## (undated) DEVICE — STERILE PRESSURE PROTECTOR PAD® FOR DE MAYO UNIVERSAL DISTRACTOR® (10/CASE): Brand: DE MAYO UNIVERSAL DISTRACTOR®

## (undated) DEVICE — CONTAINER,SPECIMEN,O.R.STRL,4.5OZ: Brand: MEDLINE

## (undated) DEVICE — SUTURE STRATAFIX SPRL SZ 1 L5IN ABSRB VLT CT-1 L36MM 1/2 SXPD2B401

## (undated) DEVICE — REM POLYHESIVE ADULT PATIENT RETURN ELECTRODE: Brand: VALLEYLAB

## (undated) DEVICE — FAN SPRAY KIT: Brand: PULSAVAC®